# Patient Record
Sex: FEMALE | Race: WHITE | Employment: PART TIME | URBAN - METROPOLITAN AREA
[De-identification: names, ages, dates, MRNs, and addresses within clinical notes are randomized per-mention and may not be internally consistent; named-entity substitution may affect disease eponyms.]

---

## 2017-02-27 RX ORDER — OMEPRAZOLE 20 MG/1
CAPSULE, DELAYED RELEASE ORAL
Qty: 90 CAPSULE | Refills: 0 | Status: SHIPPED | OUTPATIENT
Start: 2017-02-27 | End: 2017-05-24 | Stop reason: SDUPTHER

## 2017-03-08 RX ORDER — AMITRIPTYLINE HYDROCHLORIDE 25 MG/1
TABLET, FILM COATED ORAL
Qty: 90 TABLET | Refills: 0 | Status: SHIPPED | OUTPATIENT
Start: 2017-03-08 | End: 2017-04-04 | Stop reason: SDUPTHER

## 2017-03-09 ENCOUNTER — TELEPHONE (OUTPATIENT)
Dept: FAMILY MEDICINE CLINIC | Age: 52
End: 2017-03-09

## 2017-03-16 ENCOUNTER — OFFICE VISIT (OUTPATIENT)
Dept: FAMILY MEDICINE CLINIC | Age: 52
End: 2017-03-16

## 2017-03-16 VITALS
HEART RATE: 96 BPM | RESPIRATION RATE: 16 BRPM | SYSTOLIC BLOOD PRESSURE: 124 MMHG | DIASTOLIC BLOOD PRESSURE: 84 MMHG | WEIGHT: 183 LBS | BODY MASS INDEX: 30.49 KG/M2 | OXYGEN SATURATION: 98 % | TEMPERATURE: 97.4 F | HEIGHT: 65 IN

## 2017-03-16 DIAGNOSIS — N92.6 ABNORMAL MENSES: ICD-10-CM

## 2017-03-16 DIAGNOSIS — E03.9 HYPOTHYROIDISM, UNSPECIFIED TYPE: ICD-10-CM

## 2017-03-16 DIAGNOSIS — R53.83 FATIGUE, UNSPECIFIED TYPE: Primary | ICD-10-CM

## 2017-03-16 DIAGNOSIS — R63.5 WEIGHT GAIN: ICD-10-CM

## 2017-03-16 LAB
A/G RATIO: 1.7 (ref 1.1–2.2)
ALBUMIN SERPL-MCNC: 4.4 G/DL (ref 3.4–5)
ALP BLD-CCNC: 49 U/L (ref 40–129)
ALT SERPL-CCNC: 14 U/L (ref 10–40)
ANION GAP SERPL CALCULATED.3IONS-SCNC: 13 MMOL/L (ref 3–16)
AST SERPL-CCNC: 13 U/L (ref 15–37)
BASOPHILS ABSOLUTE: 0 K/UL (ref 0–0.2)
BASOPHILS RELATIVE PERCENT: 0.9 %
BILIRUB SERPL-MCNC: 0.3 MG/DL (ref 0–1)
BUN BLDV-MCNC: 17 MG/DL (ref 7–20)
CALCIUM SERPL-MCNC: 9.1 MG/DL (ref 8.3–10.6)
CHLORIDE BLD-SCNC: 107 MMOL/L (ref 99–110)
CO2: 19 MMOL/L (ref 21–32)
CREAT SERPL-MCNC: 1.1 MG/DL (ref 0.6–1.1)
EOSINOPHILS ABSOLUTE: 0.1 K/UL (ref 0–0.6)
EOSINOPHILS RELATIVE PERCENT: 2.3 %
GFR AFRICAN AMERICAN: >60
GFR NON-AFRICAN AMERICAN: 53
GLOBULIN: 2.6 G/DL
GLUCOSE BLD-MCNC: 109 MG/DL (ref 70–99)
HCT VFR BLD CALC: 47.9 % (ref 36–48)
HEMOGLOBIN: 15.1 G/DL (ref 12–16)
LYMPHOCYTES ABSOLUTE: 1.9 K/UL (ref 1–5.1)
LYMPHOCYTES RELATIVE PERCENT: 33.5 %
MCH RBC QN AUTO: 28.4 PG (ref 26–34)
MCHC RBC AUTO-ENTMCNC: 31.4 G/DL (ref 31–36)
MCV RBC AUTO: 90.5 FL (ref 80–100)
MONOCYTES ABSOLUTE: 0.4 K/UL (ref 0–1.3)
MONOCYTES RELATIVE PERCENT: 7.3 %
NEUTROPHILS ABSOLUTE: 3.1 K/UL (ref 1.7–7.7)
NEUTROPHILS RELATIVE PERCENT: 56 %
PDW BLD-RTO: 14.5 % (ref 12.4–15.4)
PLATELET # BLD: 244 K/UL (ref 135–450)
PMV BLD AUTO: 8.9 FL (ref 5–10.5)
POTASSIUM SERPL-SCNC: 4.9 MMOL/L (ref 3.5–5.1)
RBC # BLD: 5.3 M/UL (ref 4–5.2)
SODIUM BLD-SCNC: 139 MMOL/L (ref 136–145)
TOTAL PROTEIN: 7 G/DL (ref 6.4–8.2)
TSH SERPL DL<=0.05 MIU/L-ACNC: 6.59 UIU/ML (ref 0.27–4.2)
VITAMIN D 25-HYDROXY: 30.8 NG/ML
WBC # BLD: 5.5 K/UL (ref 4–11)

## 2017-03-16 PROCEDURE — 99214 OFFICE O/P EST MOD 30 MIN: CPT | Performed by: FAMILY MEDICINE

## 2017-03-16 ASSESSMENT — ENCOUNTER SYMPTOMS
NAUSEA: 0
SWOLLEN GLANDS: 0
SORE THROAT: 0
BACK PAIN: 0
COUGH: 0
VISUAL CHANGE: 0
DIARRHEA: 0
ABDOMINAL PAIN: 0
CHANGE IN BOWEL HABIT: 0
VOMITING: 0

## 2017-03-17 RX ORDER — SUMATRIPTAN 100 MG/1
TABLET, FILM COATED ORAL
Qty: 9 TABLET | Refills: 5 | Status: SHIPPED | OUTPATIENT
Start: 2017-03-17 | End: 2018-02-23 | Stop reason: SDUPTHER

## 2017-03-22 RX ORDER — LEVOTHYROXINE SODIUM 175 UG/1
175 TABLET ORAL DAILY
Qty: 30 TABLET | Refills: 3 | Status: SHIPPED | OUTPATIENT
Start: 2017-03-22 | End: 2017-07-18 | Stop reason: SDUPTHER

## 2017-04-04 DIAGNOSIS — F41.1 GAD (GENERALIZED ANXIETY DISORDER): ICD-10-CM

## 2017-04-04 RX ORDER — TOPIRAMATE 100 MG/1
TABLET, FILM COATED ORAL
Qty: 90 TABLET | Refills: 0 | Status: SHIPPED | OUTPATIENT
Start: 2017-04-04 | End: 2017-06-27 | Stop reason: SDUPTHER

## 2017-04-04 RX ORDER — AMITRIPTYLINE HYDROCHLORIDE 25 MG/1
TABLET, FILM COATED ORAL
Qty: 90 TABLET | Refills: 0 | Status: SHIPPED | OUTPATIENT
Start: 2017-04-04 | End: 2017-04-28 | Stop reason: SDUPTHER

## 2017-05-02 RX ORDER — AMITRIPTYLINE HYDROCHLORIDE 25 MG/1
TABLET, FILM COATED ORAL
Qty: 90 TABLET | Refills: 0 | Status: SHIPPED | OUTPATIENT
Start: 2017-05-02 | End: 2017-06-09 | Stop reason: SDUPTHER

## 2017-05-24 RX ORDER — OMEPRAZOLE 20 MG/1
CAPSULE, DELAYED RELEASE ORAL
Qty: 90 CAPSULE | Refills: 1 | Status: SHIPPED | OUTPATIENT
Start: 2017-05-24 | End: 2017-12-01 | Stop reason: SDUPTHER

## 2017-06-12 RX ORDER — AMITRIPTYLINE HYDROCHLORIDE 25 MG/1
TABLET, FILM COATED ORAL
Qty: 90 TABLET | Refills: 0 | Status: SHIPPED | OUTPATIENT
Start: 2017-06-12 | End: 2017-07-12 | Stop reason: SDUPTHER

## 2017-06-27 DIAGNOSIS — F41.1 GAD (GENERALIZED ANXIETY DISORDER): ICD-10-CM

## 2017-06-27 RX ORDER — BUPROPION HYDROCHLORIDE 150 MG/1
TABLET ORAL
Qty: 270 TABLET | Refills: 2 | Status: SHIPPED | OUTPATIENT
Start: 2017-06-27 | End: 2018-01-04 | Stop reason: SDUPTHER

## 2017-06-27 RX ORDER — TOPIRAMATE 100 MG/1
TABLET, FILM COATED ORAL
Qty: 90 TABLET | Refills: 0 | Status: SHIPPED | OUTPATIENT
Start: 2017-06-27 | End: 2017-10-07 | Stop reason: SDUPTHER

## 2017-07-12 RX ORDER — AMITRIPTYLINE HYDROCHLORIDE 25 MG/1
TABLET, FILM COATED ORAL
Qty: 90 TABLET | Refills: 0 | Status: SHIPPED | OUTPATIENT
Start: 2017-07-12 | End: 2017-08-13 | Stop reason: SDUPTHER

## 2017-07-18 DIAGNOSIS — E03.9 HYPOTHYROIDISM, UNSPECIFIED TYPE: ICD-10-CM

## 2017-07-18 RX ORDER — LEVOTHYROXINE SODIUM 175 UG/1
TABLET ORAL
Qty: 30 TABLET | Refills: 3 | Status: SHIPPED | OUTPATIENT
Start: 2017-07-18 | End: 2017-11-15 | Stop reason: SDUPTHER

## 2017-07-31 DIAGNOSIS — F41.1 GAD (GENERALIZED ANXIETY DISORDER): ICD-10-CM

## 2017-07-31 RX ORDER — DULOXETIN HYDROCHLORIDE 60 MG/1
CAPSULE, DELAYED RELEASE ORAL
Qty: 30 CAPSULE | Refills: 6 | Status: SHIPPED | OUTPATIENT
Start: 2017-07-31 | End: 2018-04-18 | Stop reason: SDUPTHER

## 2017-08-14 RX ORDER — AMITRIPTYLINE HYDROCHLORIDE 25 MG/1
TABLET, FILM COATED ORAL
Qty: 90 TABLET | Refills: 0 | Status: SHIPPED | OUTPATIENT
Start: 2017-08-14 | End: 2017-10-05 | Stop reason: SDUPTHER

## 2017-08-16 ENCOUNTER — OFFICE VISIT (OUTPATIENT)
Dept: FAMILY MEDICINE CLINIC | Age: 52
End: 2017-08-16

## 2017-08-16 VITALS
WEIGHT: 188 LBS | HEIGHT: 66 IN | OXYGEN SATURATION: 98 % | RESPIRATION RATE: 16 BRPM | SYSTOLIC BLOOD PRESSURE: 116 MMHG | DIASTOLIC BLOOD PRESSURE: 74 MMHG | BODY MASS INDEX: 30.22 KG/M2 | HEART RATE: 111 BPM | TEMPERATURE: 99.2 F

## 2017-08-16 DIAGNOSIS — N95.1 MENOPAUSAL SYNDROME: ICD-10-CM

## 2017-08-16 DIAGNOSIS — E66.9 OBESITY (BMI 30-39.9): ICD-10-CM

## 2017-08-16 DIAGNOSIS — R53.83 FATIGUE, UNSPECIFIED TYPE: Primary | ICD-10-CM

## 2017-08-16 DIAGNOSIS — R41.3 MEMORY LOSS: ICD-10-CM

## 2017-08-16 DIAGNOSIS — E03.9 HYPOTHYROIDISM, UNSPECIFIED TYPE: ICD-10-CM

## 2017-08-16 LAB
A/G RATIO: 1.4 (ref 1.1–2.2)
ALBUMIN SERPL-MCNC: 4.1 G/DL (ref 3.4–5)
ALP BLD-CCNC: 57 U/L (ref 40–129)
ALT SERPL-CCNC: 17 U/L (ref 10–40)
ANION GAP SERPL CALCULATED.3IONS-SCNC: 11 MMOL/L (ref 3–16)
AST SERPL-CCNC: 15 U/L (ref 15–37)
BILIRUB SERPL-MCNC: <0.2 MG/DL (ref 0–1)
BUN BLDV-MCNC: 20 MG/DL (ref 7–20)
CALCIUM SERPL-MCNC: 9 MG/DL (ref 8.3–10.6)
CHLORIDE BLD-SCNC: 102 MMOL/L (ref 99–110)
CO2: 23 MMOL/L (ref 21–32)
CORTISOL - AM: 12 UG/DL (ref 4.3–22.4)
CREAT SERPL-MCNC: 0.8 MG/DL (ref 0.6–1.1)
GFR AFRICAN AMERICAN: >60
GFR NON-AFRICAN AMERICAN: >60
GLOBULIN: 2.9 G/DL
GLUCOSE BLD-MCNC: 79 MG/DL (ref 70–99)
POTASSIUM SERPL-SCNC: 5 MMOL/L (ref 3.5–5.1)
SODIUM BLD-SCNC: 136 MMOL/L (ref 136–145)
TOTAL PROTEIN: 7 G/DL (ref 6.4–8.2)
TSH SERPL DL<=0.05 MIU/L-ACNC: 2.69 UIU/ML (ref 0.27–4.2)

## 2017-08-16 PROCEDURE — 99214 OFFICE O/P EST MOD 30 MIN: CPT | Performed by: FAMILY MEDICINE

## 2017-08-16 ASSESSMENT — ENCOUNTER SYMPTOMS
COLOR CHANGE: 0
NAUSEA: 0
SWOLLEN GLANDS: 0
RHINORRHEA: 0
EYE REDNESS: 0
TROUBLE SWALLOWING: 0
CHEST TIGHTNESS: 0
VISUAL CHANGE: 0
ABDOMINAL PAIN: 0
EYE ITCHING: 0
CHANGE IN BOWEL HABIT: 0
WHEEZING: 0
SHORTNESS OF BREATH: 0
VOMITING: 0

## 2017-08-18 LAB — ADRENOCORTICOTROPIC HORMONE: 21 PG/ML (ref 6–58)

## 2017-08-28 ENCOUNTER — OFFICE VISIT (OUTPATIENT)
Dept: FAMILY MEDICINE CLINIC | Age: 52
End: 2017-08-28

## 2017-08-28 VITALS
BODY MASS INDEX: 30.05 KG/M2 | TEMPERATURE: 97.8 F | WEIGHT: 187 LBS | RESPIRATION RATE: 16 BRPM | SYSTOLIC BLOOD PRESSURE: 112 MMHG | HEART RATE: 106 BPM | HEIGHT: 66 IN | OXYGEN SATURATION: 98 % | DIASTOLIC BLOOD PRESSURE: 78 MMHG

## 2017-08-28 DIAGNOSIS — Z12.11 SCREENING FOR COLON CANCER: ICD-10-CM

## 2017-08-28 DIAGNOSIS — Z12.31 ENCOUNTER FOR SCREENING MAMMOGRAM FOR BREAST CANCER: ICD-10-CM

## 2017-08-28 DIAGNOSIS — Z00.00 WELL ADULT EXAM: Primary | ICD-10-CM

## 2017-08-28 DIAGNOSIS — Z11.4 SCREENING FOR HIV WITHOUT PRESENCE OF RISK FACTORS: ICD-10-CM

## 2017-08-28 LAB
CHOLESTEROL, TOTAL: 178 MG/DL (ref 0–199)
HDLC SERPL-MCNC: 49 MG/DL (ref 40–60)
LDL CHOLESTEROL CALCULATED: 110 MG/DL
TRIGL SERPL-MCNC: 94 MG/DL (ref 0–150)
VLDLC SERPL CALC-MCNC: 19 MG/DL

## 2017-08-28 PROCEDURE — 99396 PREV VISIT EST AGE 40-64: CPT | Performed by: FAMILY MEDICINE

## 2017-08-28 ASSESSMENT — ENCOUNTER SYMPTOMS
TROUBLE SWALLOWING: 0
CHEST TIGHTNESS: 0
VOMITING: 0
ABDOMINAL PAIN: 0
NAUSEA: 0
RHINORRHEA: 0
WHEEZING: 0
SHORTNESS OF BREATH: 0
EYE REDNESS: 0
EYE ITCHING: 0

## 2017-08-29 LAB — HIV-1 AND HIV-2 ANTIBODIES: NORMAL

## 2017-10-07 DIAGNOSIS — F41.1 GAD (GENERALIZED ANXIETY DISORDER): ICD-10-CM

## 2017-10-09 RX ORDER — AMITRIPTYLINE HYDROCHLORIDE 25 MG/1
TABLET, FILM COATED ORAL
Qty: 90 TABLET | Refills: 0 | Status: SHIPPED | OUTPATIENT
Start: 2017-10-09 | End: 2017-11-16 | Stop reason: SDUPTHER

## 2017-10-09 RX ORDER — TOPIRAMATE 100 MG/1
TABLET, FILM COATED ORAL
Qty: 90 TABLET | Refills: 0 | Status: SHIPPED | OUTPATIENT
Start: 2017-10-09 | End: 2017-12-20 | Stop reason: SDUPTHER

## 2017-11-06 ENCOUNTER — TELEPHONE (OUTPATIENT)
Dept: FAMILY MEDICINE CLINIC | Age: 52
End: 2017-11-06

## 2017-11-06 DIAGNOSIS — E03.9 HYPOTHYROIDISM, UNSPECIFIED TYPE: Primary | ICD-10-CM

## 2017-11-07 LAB — TSH SERPL DL<=0.05 MIU/L-ACNC: 1.57 UIU/ML (ref 0.27–4.2)

## 2017-11-15 DIAGNOSIS — E03.9 HYPOTHYROIDISM, UNSPECIFIED TYPE: ICD-10-CM

## 2017-11-16 ENCOUNTER — OFFICE VISIT (OUTPATIENT)
Dept: FAMILY MEDICINE CLINIC | Age: 52
End: 2017-11-16

## 2017-11-16 VITALS
HEIGHT: 65 IN | RESPIRATION RATE: 16 BRPM | SYSTOLIC BLOOD PRESSURE: 120 MMHG | DIASTOLIC BLOOD PRESSURE: 84 MMHG | TEMPERATURE: 98.8 F | HEART RATE: 100 BPM | BODY MASS INDEX: 31.65 KG/M2 | WEIGHT: 190 LBS | OXYGEN SATURATION: 98 %

## 2017-11-16 DIAGNOSIS — E66.09 CLASS 1 OBESITY DUE TO EXCESS CALORIES WITHOUT SERIOUS COMORBIDITY WITH BODY MASS INDEX (BMI) OF 31.0 TO 31.9 IN ADULT: ICD-10-CM

## 2017-11-16 DIAGNOSIS — G47.00 INSOMNIA, UNSPECIFIED TYPE: ICD-10-CM

## 2017-11-16 DIAGNOSIS — F41.0 PANIC ATTACKS: Primary | ICD-10-CM

## 2017-11-16 PROCEDURE — G8484 FLU IMMUNIZE NO ADMIN: HCPCS | Performed by: FAMILY MEDICINE

## 2017-11-16 PROCEDURE — 3017F COLORECTAL CA SCREEN DOC REV: CPT | Performed by: FAMILY MEDICINE

## 2017-11-16 PROCEDURE — G8417 CALC BMI ABV UP PARAM F/U: HCPCS | Performed by: FAMILY MEDICINE

## 2017-11-16 PROCEDURE — G8427 DOCREV CUR MEDS BY ELIG CLIN: HCPCS | Performed by: FAMILY MEDICINE

## 2017-11-16 PROCEDURE — 99214 OFFICE O/P EST MOD 30 MIN: CPT | Performed by: FAMILY MEDICINE

## 2017-11-16 PROCEDURE — 1036F TOBACCO NON-USER: CPT | Performed by: FAMILY MEDICINE

## 2017-11-16 RX ORDER — LEVOTHYROXINE SODIUM 175 UG/1
TABLET ORAL
Qty: 30 TABLET | Refills: 3 | Status: SHIPPED | OUTPATIENT
Start: 2017-11-16 | End: 2018-05-15 | Stop reason: SDUPTHER

## 2017-11-16 RX ORDER — AMITRIPTYLINE HYDROCHLORIDE 25 MG/1
TABLET, FILM COATED ORAL
Qty: 90 TABLET | Refills: 0 | Status: SHIPPED | OUTPATIENT
Start: 2017-11-16 | End: 2017-12-17 | Stop reason: SDUPTHER

## 2017-11-16 RX ORDER — CLONAZEPAM 1 MG/1
1 TABLET ORAL 2 TIMES DAILY PRN
Qty: 60 TABLET | Refills: 1 | Status: SHIPPED | OUTPATIENT
Start: 2017-11-16 | End: 2018-08-06 | Stop reason: SDUPTHER

## 2017-11-16 ASSESSMENT — ENCOUNTER SYMPTOMS
VISUAL CHANGE: 0
ABDOMINAL PAIN: 0

## 2017-11-16 NOTE — PROGRESS NOTES
Prescriptions:     clonazePAM (KLONOPIN) 1 MG tablet, Take 1 tablet by mouth 2 times daily as needed for Anxiety . , Disp: 60 tablet, Rfl: 1    amitriptyline (ELAVIL) 25 MG tablet, TAKE 3 TABLETS BY MOUTH AT BEDTIME, Disp: 90 tablet, Rfl: 0    topiramate (TOPAMAX) 100 MG tablet, TAKE ONE TABLET BY MOUTH DAILY, Disp: 90 tablet, Rfl: 0    DULoxetine (CYMBALTA) 60 MG extended release capsule, TAKE 1 CAPSULE BY MOUTH DAILY, Disp: 30 capsule, Rfl: 6    levothyroxine (SYNTHROID) 175 MCG tablet, TAKE 1 TABLET BY MOUTH DAILY, Disp: 30 tablet, Rfl: 3    buPROPion (WELLBUTRIN XL) 150 MG extended release tablet, TAKE 3 TABLETS BY MOUTH EVERY MORNING, Disp: 270 tablet, Rfl: 2    omeprazole (PRILOSEC) 20 MG delayed release capsule, TAKE ONE CAPSULE BY MOUTH ONE TIME DAILY, Disp: 90 capsule, Rfl: 1    SUMAtriptan (IMITREX) 100 MG tablet, TAKE ONE TABLET BY MOUTH AT ONSET OF HEADACHE. MAY REPEAT IN 2 HOURS MAX OF 2 TABS PER 24 HOURS, Disp: 9 tablet, Rfl: 5    butalbital-acetaminophen-caffeine (FIORICET, ESGIC) -40 MG per tablet, Take 1 tablet by mouth every 6 hours as needed for Pain or Headaches., Disp: 30 tablet, Rfl: 1    MedroxyPROGESTERone Acetate (DEPO-PROVERA IM), Inject  into the muscle., Disp: , Rfl:     propranolol (INDERAL) 10 MG tablet, Take 10 mg by mouth 2 times daily. , Disp: , Rfl:     primidone (MYSOLINE) 50 MG tablet, Take 100 mg by mouth nightly., Disp: , Rfl:      has a past medical history of Anxiety; Breast cancer ([address] Cervical disc disorder at C6-C7 level with myelopathy; Cervical spondylarthritis; Depression; Hypothyroidism; Migraine; Neuropathy ([address] TMJ (temporomandibular joint syndrome); TMJ disease; and Urinary incontinence. Past Surgical History:   Procedure Laterality Date    BREAST LUMPECTOMY      right at  48 yo     KNEE SURGERY Right 2013    SPINAL FUSION  2013    TONSILLECTOMY          reports that she has never smoked.  She has never used smokeless tobacco. She reports that she drinks alcohol.    family history includes Cancer in her mother; High Blood Pressure in her brother; Mental Illness in her mother; Other in her brother and mother. Objective:   Physical Exam   Constitutional: She is oriented to person, place, and time. She appears well-developed and well-nourished. No distress. Obese WF     HENT:   Head: Normocephalic. Hearing intact to nml conversation      Eyes: Conjunctivae and EOM are normal. Pupils are equal, round, and reactive to light. No scleral icterus. Neck: Normal range of motion. Cardiovascular: Normal rate and regular rhythm. No murmur heard. Pulmonary/Chest: Effort normal and breath sounds normal. No respiratory distress. She has no wheezes. Musculoskeletal: Normal range of motion. Neurological: She is alert and oriented to person, place, and time. No cranial nerve deficit. Coordination normal.   Skin: Skin is warm. No erythema. No pallor. Psychiatric: She has a normal mood and affect. Her behavior is normal. Judgment and thought content normal.   Nursing note and vitals reviewed. Assessment:      1. Panic attacks     2. Insomnia, unspecified type     3. Class 1 obesity due to excess calories without serious comorbidity with body mass index (BMI) of 31.0 to 31.9 in adult             Plan:      1. Deteriorated  Refill klonopin ONLY prn   Controlled Substances Monitoring: Attestation: The Prescription Monitoring Report for this patient was reviewed today. Sundeep Blum MD)  Documentation: No signs of potential drug abuse or diversion identified. Sundeep Blum MD)      2. Sleep hygiene  Refills  Do not take elavil/klonopin together,   patient agrees and verbalizes understanding    3.   Counseling: encourage to adjust caloric and fat  intake to maintain or achieve ideal body weight, to emphasize fruits, vegetables, whole grains, if possible drink  vegetable milks, reduce meats, poultry, fish, and rich in legume like beans,lentus,

## 2017-12-01 RX ORDER — OMEPRAZOLE 20 MG/1
CAPSULE, DELAYED RELEASE ORAL
Qty: 90 CAPSULE | Refills: 1 | Status: SHIPPED | OUTPATIENT
Start: 2017-12-01 | End: 2018-06-17 | Stop reason: SDUPTHER

## 2017-12-18 RX ORDER — AMITRIPTYLINE HYDROCHLORIDE 25 MG/1
TABLET, FILM COATED ORAL
Qty: 90 TABLET | Refills: 0 | Status: SHIPPED | OUTPATIENT
Start: 2017-12-18 | End: 2018-01-14 | Stop reason: SDUPTHER

## 2017-12-20 DIAGNOSIS — F41.1 GAD (GENERALIZED ANXIETY DISORDER): ICD-10-CM

## 2017-12-20 RX ORDER — TOPIRAMATE 100 MG/1
TABLET, FILM COATED ORAL
Qty: 90 TABLET | Refills: 1 | Status: SHIPPED | OUTPATIENT
Start: 2017-12-20 | End: 2018-08-06 | Stop reason: ALTCHOICE

## 2018-01-04 DIAGNOSIS — F41.1 GAD (GENERALIZED ANXIETY DISORDER): ICD-10-CM

## 2018-01-04 RX ORDER — BUPROPION HYDROCHLORIDE 150 MG/1
TABLET ORAL
Qty: 270 TABLET | Refills: 2 | Status: SHIPPED | OUTPATIENT
Start: 2018-01-04 | End: 2018-03-19 | Stop reason: SDUPTHER

## 2018-01-15 RX ORDER — AMITRIPTYLINE HYDROCHLORIDE 25 MG/1
TABLET, FILM COATED ORAL
Qty: 90 TABLET | Refills: 0 | Status: SHIPPED | OUTPATIENT
Start: 2018-01-15 | End: 2018-02-13 | Stop reason: SDUPTHER

## 2018-02-13 RX ORDER — AMITRIPTYLINE HYDROCHLORIDE 25 MG/1
TABLET, FILM COATED ORAL
Qty: 90 TABLET | Refills: 0 | Status: SHIPPED | OUTPATIENT
Start: 2018-02-13 | End: 2018-03-13 | Stop reason: SDUPTHER

## 2018-02-26 RX ORDER — SUMATRIPTAN 100 MG/1
TABLET, FILM COATED ORAL
Qty: 9 TABLET | Refills: 5 | Status: SHIPPED | OUTPATIENT
Start: 2018-02-26 | End: 2018-12-20 | Stop reason: SDUPTHER

## 2018-03-13 RX ORDER — AMITRIPTYLINE HYDROCHLORIDE 25 MG/1
TABLET, FILM COATED ORAL
Qty: 90 TABLET | Refills: 0 | Status: SHIPPED | OUTPATIENT
Start: 2018-03-13 | End: 2018-04-19 | Stop reason: SDUPTHER

## 2018-03-19 DIAGNOSIS — F41.1 GAD (GENERALIZED ANXIETY DISORDER): ICD-10-CM

## 2018-03-19 NOTE — TELEPHONE ENCOUNTER
[facility] states that they will need to get a PA on bupropion er 150 mg tabs or send a new rx for 300 mg and 150 mg to equal the 450 mg per day. Insurance will not pay for 3 tabs of this dose per day. Please advise.

## 2018-03-26 ENCOUNTER — TELEPHONE (OUTPATIENT)
Dept: FAMILY MEDICINE CLINIC | Age: 53
End: 2018-03-26

## 2018-03-26 DIAGNOSIS — F41.1 GAD (GENERALIZED ANXIETY DISORDER): ICD-10-CM

## 2018-03-26 RX ORDER — BUPROPION HYDROCHLORIDE 150 MG/1
TABLET ORAL
Qty: 270 TABLET | Refills: 2 | Status: SHIPPED | OUTPATIENT
Start: 2018-03-26 | End: 2020-05-11

## 2018-03-26 NOTE — TELEPHONE ENCOUNTER
[facility] states that they will need a new rx for 300 mg and 150 mg to equal the 450 mg per day. Insurance will not pay for 3 tabs of this dose per day. Please advise.

## 2018-03-26 NOTE — TELEPHONE ENCOUNTER
[facility] is calling today stating that the patients buPROPion (WELLBUTRIN XL) 150 MG extended release tablet that her insurance will cover two separate rx's of 300MG and 150MG  Please rudy

## 2018-03-27 RX ORDER — BUPROPION HYDROCHLORIDE 150 MG/1
150 TABLET ORAL EVERY MORNING
Qty: 90 TABLET | Refills: 1 | Status: SHIPPED | OUTPATIENT
Start: 2018-03-27 | End: 2018-09-12 | Stop reason: SDUPTHER

## 2018-03-27 RX ORDER — BUPROPION HYDROCHLORIDE 300 MG/1
300 TABLET ORAL EVERY MORNING
Qty: 90 TABLET | Refills: 1 | Status: SHIPPED | OUTPATIENT
Start: 2018-03-27 | End: 2018-09-12 | Stop reason: SDUPTHER

## 2018-04-18 DIAGNOSIS — F41.1 GAD (GENERALIZED ANXIETY DISORDER): ICD-10-CM

## 2018-04-18 RX ORDER — DULOXETIN HYDROCHLORIDE 60 MG/1
CAPSULE, DELAYED RELEASE ORAL
Qty: 30 CAPSULE | Refills: 6 | Status: SHIPPED | OUTPATIENT
Start: 2018-04-18 | End: 2018-08-06

## 2018-04-19 RX ORDER — AMITRIPTYLINE HYDROCHLORIDE 25 MG/1
TABLET, FILM COATED ORAL
Qty: 90 TABLET | Refills: 0 | Status: SHIPPED | OUTPATIENT
Start: 2018-04-19 | End: 2018-05-16 | Stop reason: SDUPTHER

## 2018-05-15 DIAGNOSIS — E03.9 HYPOTHYROIDISM, UNSPECIFIED TYPE: ICD-10-CM

## 2018-05-15 RX ORDER — LEVOTHYROXINE SODIUM 175 UG/1
TABLET ORAL
Qty: 30 TABLET | Refills: 3 | Status: SHIPPED | OUTPATIENT
Start: 2018-05-15 | End: 2018-09-12 | Stop reason: SDUPTHER

## 2018-05-16 RX ORDER — AMITRIPTYLINE HYDROCHLORIDE 25 MG/1
TABLET, FILM COATED ORAL
Qty: 90 TABLET | Refills: 0 | Status: SHIPPED | OUTPATIENT
Start: 2018-05-16 | End: 2018-06-19 | Stop reason: SDUPTHER

## 2018-06-19 RX ORDER — OMEPRAZOLE 20 MG/1
CAPSULE, DELAYED RELEASE ORAL
Qty: 90 CAPSULE | Refills: 1 | Status: SHIPPED | OUTPATIENT
Start: 2018-06-19 | End: 2018-12-11 | Stop reason: SDUPTHER

## 2018-06-20 RX ORDER — AMITRIPTYLINE HYDROCHLORIDE 25 MG/1
TABLET, FILM COATED ORAL
Qty: 90 TABLET | Refills: 0 | Status: SHIPPED | OUTPATIENT
Start: 2018-06-20 | End: 2018-07-18 | Stop reason: SDUPTHER

## 2018-07-19 RX ORDER — AMITRIPTYLINE HYDROCHLORIDE 25 MG/1
TABLET, FILM COATED ORAL
Qty: 90 TABLET | Refills: 0 | Status: SHIPPED | OUTPATIENT
Start: 2018-07-19 | End: 2018-08-16 | Stop reason: SDUPTHER

## 2018-08-01 ENCOUNTER — TELEPHONE (OUTPATIENT)
Dept: FAMILY MEDICINE CLINIC | Age: 53
End: 2018-08-01

## 2018-08-01 DIAGNOSIS — Z00.00 WELL ADULT EXAM: Primary | ICD-10-CM

## 2018-08-01 NOTE — TELEPHONE ENCOUNTER
Patient wants to have blood work done prior to her apointment on [facility]      Please call when done.

## 2018-08-02 DIAGNOSIS — Z00.00 WELL ADULT EXAM: ICD-10-CM

## 2018-08-02 LAB
A/G RATIO: 1.7 (ref 1.1–2.2)
ALBUMIN SERPL-MCNC: 4.3 G/DL (ref 3.4–5)
ALP BLD-CCNC: 57 U/L (ref 40–129)
ALT SERPL-CCNC: 22 U/L (ref 10–40)
ANION GAP SERPL CALCULATED.3IONS-SCNC: 15 MMOL/L (ref 3–16)
AST SERPL-CCNC: 19 U/L (ref 15–37)
BASOPHILS ABSOLUTE: 0.1 K/UL (ref 0–0.2)
BASOPHILS RELATIVE PERCENT: 1.2 %
BILIRUB SERPL-MCNC: <0.2 MG/DL (ref 0–1)
BUN BLDV-MCNC: 14 MG/DL (ref 7–20)
CALCIUM SERPL-MCNC: 9.1 MG/DL (ref 8.3–10.6)
CHLORIDE BLD-SCNC: 105 MMOL/L (ref 99–110)
CHOLESTEROL, TOTAL: 191 MG/DL (ref 0–199)
CO2: 20 MMOL/L (ref 21–32)
CREAT SERPL-MCNC: 0.9 MG/DL (ref 0.6–1.1)
EOSINOPHILS ABSOLUTE: 0.2 K/UL (ref 0–0.6)
EOSINOPHILS RELATIVE PERCENT: 3 %
GFR AFRICAN AMERICAN: >60
GFR NON-AFRICAN AMERICAN: >60
GLOBULIN: 2.6 G/DL
GLUCOSE BLD-MCNC: 114 MG/DL (ref 70–99)
HCT VFR BLD CALC: 45.6 % (ref 36–48)
HDLC SERPL-MCNC: 51 MG/DL (ref 40–60)
HEMOGLOBIN: 14.9 G/DL (ref 12–16)
LDL CHOLESTEROL CALCULATED: 122 MG/DL
LYMPHOCYTES ABSOLUTE: 2 K/UL (ref 1–5.1)
LYMPHOCYTES RELATIVE PERCENT: 26.6 %
MCH RBC QN AUTO: 29.2 PG (ref 26–34)
MCHC RBC AUTO-ENTMCNC: 32.6 G/DL (ref 31–36)
MCV RBC AUTO: 89.6 FL (ref 80–100)
MONOCYTES ABSOLUTE: 0.5 K/UL (ref 0–1.3)
MONOCYTES RELATIVE PERCENT: 6.4 %
NEUTROPHILS ABSOLUTE: 4.7 K/UL (ref 1.7–7.7)
NEUTROPHILS RELATIVE PERCENT: 62.8 %
PDW BLD-RTO: 13.8 % (ref 12.4–15.4)
PLATELET # BLD: 236 K/UL (ref 135–450)
PMV BLD AUTO: 8.5 FL (ref 5–10.5)
POTASSIUM SERPL-SCNC: 4.5 MMOL/L (ref 3.5–5.1)
RBC # BLD: 5.09 M/UL (ref 4–5.2)
SODIUM BLD-SCNC: 140 MMOL/L (ref 136–145)
TOTAL PROTEIN: 6.9 G/DL (ref 6.4–8.2)
TRIGL SERPL-MCNC: 90 MG/DL (ref 0–150)
TSH SERPL DL<=0.05 MIU/L-ACNC: 1.73 UIU/ML (ref 0.27–4.2)
VLDLC SERPL CALC-MCNC: 18 MG/DL
WBC # BLD: 7.4 K/UL (ref 4–11)

## 2018-08-06 ENCOUNTER — OFFICE VISIT (OUTPATIENT)
Dept: FAMILY MEDICINE CLINIC | Age: 53
End: 2018-08-06

## 2018-08-06 VITALS
BODY MASS INDEX: 30.05 KG/M2 | RESPIRATION RATE: 16 BRPM | SYSTOLIC BLOOD PRESSURE: 122 MMHG | HEART RATE: 96 BPM | OXYGEN SATURATION: 98 % | TEMPERATURE: 97.7 F | WEIGHT: 187 LBS | HEIGHT: 66 IN | DIASTOLIC BLOOD PRESSURE: 84 MMHG

## 2018-08-06 DIAGNOSIS — R73.03 PRE-DIABETES: ICD-10-CM

## 2018-08-06 DIAGNOSIS — R51.9 CHRONIC NONINTRACTABLE HEADACHE, UNSPECIFIED HEADACHE TYPE: ICD-10-CM

## 2018-08-06 DIAGNOSIS — R51.9 NONINTRACTABLE HEADACHE, UNSPECIFIED CHRONICITY PATTERN, UNSPECIFIED HEADACHE TYPE: ICD-10-CM

## 2018-08-06 DIAGNOSIS — E66.09 CLASS 1 OBESITY DUE TO EXCESS CALORIES WITHOUT SERIOUS COMORBIDITY WITH BODY MASS INDEX (BMI) OF 30.0 TO 30.9 IN ADULT: ICD-10-CM

## 2018-08-06 DIAGNOSIS — F41.8 DEPRESSION WITH ANXIETY: ICD-10-CM

## 2018-08-06 DIAGNOSIS — G89.29 CHRONIC NONINTRACTABLE HEADACHE, UNSPECIFIED HEADACHE TYPE: ICD-10-CM

## 2018-08-06 DIAGNOSIS — Z00.00 WELL ADULT EXAM: Primary | ICD-10-CM

## 2018-08-06 DIAGNOSIS — F41.0 PANIC ATTACKS: ICD-10-CM

## 2018-08-06 PROBLEM — E66.811 CLASS 1 OBESITY DUE TO EXCESS CALORIES WITHOUT SERIOUS COMORBIDITY WITH BODY MASS INDEX (BMI) OF 30.0 TO 30.9 IN ADULT: Status: ACTIVE | Noted: 2018-08-06

## 2018-08-06 PROCEDURE — G0444 DEPRESSION SCREEN ANNUAL: HCPCS | Performed by: FAMILY MEDICINE

## 2018-08-06 PROCEDURE — 99213 OFFICE O/P EST LOW 20 MIN: CPT | Performed by: FAMILY MEDICINE

## 2018-08-06 PROCEDURE — 99396 PREV VISIT EST AGE 40-64: CPT | Performed by: FAMILY MEDICINE

## 2018-08-06 RX ORDER — ESCITALOPRAM OXALATE 20 MG/1
20 TABLET ORAL DAILY
Qty: 30 TABLET | Refills: 5 | Status: SHIPPED | OUTPATIENT
Start: 2018-08-06 | End: 2019-02-06 | Stop reason: SDUPTHER

## 2018-08-06 RX ORDER — DULOXETIN HYDROCHLORIDE 20 MG/1
20 CAPSULE, DELAYED RELEASE ORAL DAILY
Qty: 14 CAPSULE | Refills: 0 | Status: SHIPPED | OUTPATIENT
Start: 2018-08-06 | End: 2020-05-11 | Stop reason: ALTCHOICE

## 2018-08-06 RX ORDER — INDOMETHACIN 25 MG/1
25 CAPSULE ORAL 3 TIMES DAILY
Qty: 60 CAPSULE | Refills: 3
Start: 2018-08-06 | End: 2020-05-11 | Stop reason: ALTCHOICE

## 2018-08-06 RX ORDER — BUTALBITAL, ACETAMINOPHEN AND CAFFEINE 50; 325; 40 MG/1; MG/1; MG/1
1 TABLET ORAL EVERY 6 HOURS PRN
Qty: 30 TABLET | Refills: 1 | Status: SHIPPED | OUTPATIENT
Start: 2018-08-06 | End: 2019-01-28 | Stop reason: SDUPTHER

## 2018-08-06 RX ORDER — CLONAZEPAM 1 MG/1
1 TABLET ORAL 2 TIMES DAILY PRN
Qty: 60 TABLET | Refills: 0 | Status: SHIPPED | OUTPATIENT
Start: 2018-08-06 | End: 2020-05-11 | Stop reason: ALTCHOICE

## 2018-08-06 RX ORDER — DULOXETIN HYDROCHLORIDE 30 MG/1
30 CAPSULE, DELAYED RELEASE ORAL DAILY
Qty: 14 CAPSULE | Refills: 0 | Status: SHIPPED | OUTPATIENT
Start: 2018-08-06 | End: 2020-05-11 | Stop reason: ALTCHOICE

## 2018-08-06 ASSESSMENT — PATIENT HEALTH QUESTIONNAIRE - PHQ9
4. FEELING TIRED OR HAVING LITTLE ENERGY: 2
7. TROUBLE CONCENTRATING ON THINGS, SUCH AS READING THE NEWSPAPER OR WATCHING TELEVISION: 0
8. MOVING OR SPEAKING SO SLOWLY THAT OTHER PEOPLE COULD HAVE NOTICED. OR THE OPPOSITE, BEING SO FIGETY OR RESTLESS THAT YOU HAVE BEEN MOVING AROUND A LOT MORE THAN USUAL: 2
9. THOUGHTS THAT YOU WOULD BE BETTER OFF DEAD, OR OF HURTING YOURSELF: 0
10. IF YOU CHECKED OFF ANY PROBLEMS, HOW DIFFICULT HAVE THESE PROBLEMS MADE IT FOR YOU TO DO YOUR WORK, TAKE CARE OF THINGS AT HOME, OR GET ALONG WITH OTHER PEOPLE: 1
SUM OF ALL RESPONSES TO PHQ QUESTIONS 1-9: 14
2. FEELING DOWN, DEPRESSED OR HOPELESS: 3
SUM OF ALL RESPONSES TO PHQ9 QUESTIONS 1 & 2: 4
7. TROUBLE CONCENTRATING ON THINGS, SUCH AS READING THE NEWSPAPER OR WATCHING TELEVISION: 2
1. LITTLE INTEREST OR PLEASURE IN DOING THINGS: 1
5. POOR APPETITE OR OVEREATING: 2
3. TROUBLE FALLING OR STAYING ASLEEP: 2
6. FEELING BAD ABOUT YOURSELF - OR THAT YOU ARE A FAILURE OR HAVE LET YOURSELF OR YOUR FAMILY DOWN: 2
SUM OF ALL RESPONSES TO PHQ9 QUESTIONS 1 & 2: 4
SUM OF ALL RESPONSES TO PHQ QUESTIONS 1-9: 15
8. MOVING OR SPEAKING SO SLOWLY THAT OTHER PEOPLE COULD HAVE NOTICED. OR THE OPPOSITE, BEING SO FIGETY OR RESTLESS THAT YOU HAVE BEEN MOVING AROUND A LOT MORE THAN USUAL: 0
2. FEELING DOWN, DEPRESSED OR HOPELESS: 2
1. LITTLE INTEREST OR PLEASURE IN DOING THINGS: 2
6. FEELING BAD ABOUT YOURSELF - OR THAT YOU ARE A FAILURE OR HAVE LET YOURSELF OR YOUR FAMILY DOWN: 2
10. IF YOU CHECKED OFF ANY PROBLEMS, HOW DIFFICULT HAVE THESE PROBLEMS MADE IT FOR YOU TO DO YOUR WORK, TAKE CARE OF THINGS AT HOME, OR GET ALONG WITH OTHER PEOPLE: 0
9. THOUGHTS THAT YOU WOULD BE BETTER OFF DEAD, OR OF HURTING YOURSELF: 0
5. POOR APPETITE OR OVEREATING: 1
3. TROUBLE FALLING OR STAYING ASLEEP: 3
4. FEELING TIRED OR HAVING LITTLE ENERGY: 3

## 2018-08-06 ASSESSMENT — ENCOUNTER SYMPTOMS
NAUSEA: 0
SHORTNESS OF BREATH: 0
EYE REDNESS: 0
RHINORRHEA: 0
WHEEZING: 0
EYE ITCHING: 0
TROUBLE SWALLOWING: 0
ABDOMINAL PAIN: 0
VOMITING: 0
CHEST TIGHTNESS: 0

## 2018-08-17 RX ORDER — AMITRIPTYLINE HYDROCHLORIDE 25 MG/1
TABLET, FILM COATED ORAL
Qty: 90 TABLET | Refills: 0 | Status: SHIPPED | OUTPATIENT
Start: 2018-08-17 | End: 2018-09-13 | Stop reason: SDUPTHER

## 2018-09-12 DIAGNOSIS — E03.9 HYPOTHYROIDISM, UNSPECIFIED TYPE: ICD-10-CM

## 2018-09-13 RX ORDER — BUPROPION HYDROCHLORIDE 300 MG/1
300 TABLET ORAL EVERY MORNING
Qty: 90 TABLET | Refills: 1 | Status: SHIPPED | OUTPATIENT
Start: 2018-09-13 | End: 2019-05-06 | Stop reason: SDUPTHER

## 2018-09-13 RX ORDER — LEVOTHYROXINE SODIUM 175 UG/1
TABLET ORAL
Qty: 30 TABLET | Refills: 3 | Status: SHIPPED | OUTPATIENT
Start: 2018-09-13 | End: 2019-01-29 | Stop reason: SDUPTHER

## 2018-09-13 RX ORDER — BUPROPION HYDROCHLORIDE 150 MG/1
150 TABLET ORAL EVERY MORNING
Qty: 90 TABLET | Refills: 1 | Status: SHIPPED | OUTPATIENT
Start: 2018-09-13 | End: 2019-05-06 | Stop reason: SDUPTHER

## 2018-09-14 RX ORDER — AMITRIPTYLINE HYDROCHLORIDE 25 MG/1
TABLET, FILM COATED ORAL
Qty: 90 TABLET | Refills: 0 | Status: SHIPPED | OUTPATIENT
Start: 2018-09-14 | End: 2018-10-16 | Stop reason: SDUPTHER

## 2018-11-05 ENCOUNTER — TELEPHONE (OUTPATIENT)
Dept: FAMILY MEDICINE CLINIC | Age: 53
End: 2018-11-05

## 2018-11-05 DIAGNOSIS — E03.9 HYPOTHYROIDISM, UNSPECIFIED TYPE: ICD-10-CM

## 2018-11-05 DIAGNOSIS — R73.03 PRE-DIABETES: Primary | ICD-10-CM

## 2018-11-10 LAB
ANION GAP SERPL CALCULATED.3IONS-SCNC: 13 MMOL/L (ref 3–16)
BUN BLDV-MCNC: 15 MG/DL (ref 7–20)
CALCIUM SERPL-MCNC: 9.4 MG/DL (ref 8.3–10.6)
CHLORIDE BLD-SCNC: 106 MMOL/L (ref 99–110)
CO2: 23 MMOL/L (ref 21–32)
CREAT SERPL-MCNC: 0.8 MG/DL (ref 0.6–1.1)
ESTIMATED AVERAGE GLUCOSE: 128.4 MG/DL
GFR AFRICAN AMERICAN: >60
GFR NON-AFRICAN AMERICAN: >60
GLUCOSE BLD-MCNC: 105 MG/DL (ref 70–99)
HBA1C MFR BLD: 6.1 %
POTASSIUM SERPL-SCNC: 4.9 MMOL/L (ref 3.5–5.1)
SODIUM BLD-SCNC: 142 MMOL/L (ref 136–145)
TSH SERPL DL<=0.05 MIU/L-ACNC: 36.26 UIU/ML (ref 0.27–4.2)

## 2018-11-13 DIAGNOSIS — E03.9 HYPOTHYROIDISM, UNSPECIFIED TYPE: Primary | ICD-10-CM

## 2018-12-11 RX ORDER — OMEPRAZOLE 20 MG/1
CAPSULE, DELAYED RELEASE ORAL
Qty: 90 CAPSULE | Refills: 1 | Status: SHIPPED | OUTPATIENT
Start: 2018-12-11 | End: 2019-08-05 | Stop reason: SDUPTHER

## 2018-12-20 RX ORDER — SUMATRIPTAN 100 MG/1
TABLET, FILM COATED ORAL
Qty: 9 TABLET | Refills: 5 | Status: SHIPPED | OUTPATIENT
Start: 2018-12-20 | End: 2019-08-14 | Stop reason: SDUPTHER

## 2019-01-28 DIAGNOSIS — R51.9 NONINTRACTABLE HEADACHE, UNSPECIFIED CHRONICITY PATTERN, UNSPECIFIED HEADACHE TYPE: ICD-10-CM

## 2019-01-29 DIAGNOSIS — E03.9 HYPOTHYROIDISM, UNSPECIFIED TYPE: ICD-10-CM

## 2019-01-29 RX ORDER — LEVOTHYROXINE SODIUM 175 UG/1
TABLET ORAL
Qty: 30 TABLET | Refills: 3 | Status: SHIPPED | OUTPATIENT
Start: 2019-01-29 | End: 2019-11-09 | Stop reason: SDUPTHER

## 2019-01-29 RX ORDER — BUTALBITAL, ACETAMINOPHEN AND CAFFEINE 50; 325; 40 MG/1; MG/1; MG/1
1 TABLET ORAL EVERY 6 HOURS PRN
Qty: 30 TABLET | Refills: 1 | Status: SHIPPED | OUTPATIENT
Start: 2019-01-29 | End: 2019-09-24 | Stop reason: SDUPTHER

## 2019-02-06 RX ORDER — ESCITALOPRAM OXALATE 20 MG/1
TABLET ORAL
Qty: 30 TABLET | Refills: 5 | Status: SHIPPED | OUTPATIENT
Start: 2019-02-06 | End: 2019-12-03 | Stop reason: SDUPTHER

## 2019-02-06 RX ORDER — AMITRIPTYLINE HYDROCHLORIDE 25 MG/1
TABLET, FILM COATED ORAL
Qty: 90 TABLET | Refills: 0 | Status: SHIPPED | OUTPATIENT
Start: 2019-02-06 | End: 2019-06-30 | Stop reason: SDUPTHER

## 2019-05-06 RX ORDER — BUPROPION HYDROCHLORIDE 300 MG/1
300 TABLET ORAL EVERY MORNING
Qty: 90 TABLET | Refills: 1 | Status: SHIPPED | OUTPATIENT
Start: 2019-05-06 | End: 2019-11-03 | Stop reason: SDUPTHER

## 2019-05-06 RX ORDER — BUPROPION HYDROCHLORIDE 150 MG/1
150 TABLET ORAL EVERY MORNING
Qty: 90 TABLET | Refills: 1 | Status: SHIPPED | OUTPATIENT
Start: 2019-05-06 | End: 2019-11-03 | Stop reason: SDUPTHER

## 2019-08-28 ENCOUNTER — OFFICE VISIT (OUTPATIENT)
Dept: FAMILY MEDICINE CLINIC | Age: 54
End: 2019-08-28
Payer: COMMERCIAL

## 2019-08-28 VITALS
BODY MASS INDEX: 30.67 KG/M2 | HEIGHT: 66 IN | RESPIRATION RATE: 16 BRPM | DIASTOLIC BLOOD PRESSURE: 82 MMHG | WEIGHT: 190.8 LBS | SYSTOLIC BLOOD PRESSURE: 128 MMHG | OXYGEN SATURATION: 98 % | HEART RATE: 87 BPM | TEMPERATURE: 98.9 F

## 2019-08-28 DIAGNOSIS — Z00.00 WELL ADULT EXAM: Primary | ICD-10-CM

## 2019-08-28 DIAGNOSIS — E03.9 HYPOTHYROIDISM, UNSPECIFIED TYPE: ICD-10-CM

## 2019-08-28 LAB
ANION GAP SERPL CALCULATED.3IONS-SCNC: 14 MMOL/L (ref 3–16)
BUN BLDV-MCNC: 19 MG/DL (ref 7–20)
CALCIUM SERPL-MCNC: 9.7 MG/DL (ref 8.3–10.6)
CHLORIDE BLD-SCNC: 102 MMOL/L (ref 99–110)
CHOLESTEROL, TOTAL: 212 MG/DL (ref 0–199)
CO2: 24 MMOL/L (ref 21–32)
CREAT SERPL-MCNC: 1 MG/DL (ref 0.6–1.1)
GFR AFRICAN AMERICAN: >60
GFR NON-AFRICAN AMERICAN: 58
GLUCOSE BLD-MCNC: 115 MG/DL (ref 70–99)
HDLC SERPL-MCNC: 65 MG/DL (ref 40–60)
LDL CHOLESTEROL CALCULATED: 128 MG/DL
POTASSIUM SERPL-SCNC: 4.8 MMOL/L (ref 3.5–5.1)
SODIUM BLD-SCNC: 140 MMOL/L (ref 136–145)
TRIGL SERPL-MCNC: 95 MG/DL (ref 0–150)
TSH SERPL DL<=0.05 MIU/L-ACNC: 3.51 UIU/ML (ref 0.27–4.2)
VLDLC SERPL CALC-MCNC: 19 MG/DL

## 2019-08-28 PROCEDURE — 99396 PREV VISIT EST AGE 40-64: CPT | Performed by: FAMILY MEDICINE

## 2019-08-28 PROCEDURE — 36415 COLL VENOUS BLD VENIPUNCTURE: CPT | Performed by: FAMILY MEDICINE

## 2019-08-28 ASSESSMENT — ENCOUNTER SYMPTOMS
NAUSEA: 0
TROUBLE SWALLOWING: 0
ABDOMINAL PAIN: 0
VOMITING: 0
WHEEZING: 0
EYE ITCHING: 0
SHORTNESS OF BREATH: 0
RHINORRHEA: 0
EYE REDNESS: 0
CHEST TIGHTNESS: 0

## 2019-09-09 RX ORDER — OMEPRAZOLE 20 MG/1
CAPSULE, DELAYED RELEASE ORAL
Qty: 30 CAPSULE | Refills: 5 | Status: SHIPPED | OUTPATIENT
Start: 2019-09-09 | End: 2021-11-17 | Stop reason: SDUPTHER

## 2019-09-24 DIAGNOSIS — R51.9 NONINTRACTABLE HEADACHE, UNSPECIFIED CHRONICITY PATTERN, UNSPECIFIED HEADACHE TYPE: ICD-10-CM

## 2019-09-24 RX ORDER — BUTALBITAL, ACETAMINOPHEN AND CAFFEINE 50; 325; 40 MG/1; MG/1; MG/1
1 TABLET ORAL EVERY 6 HOURS PRN
Qty: 30 TABLET | Refills: 1 | Status: SHIPPED | OUTPATIENT
Start: 2019-09-24 | End: 2020-12-29

## 2019-09-24 RX ORDER — SUMATRIPTAN 100 MG/1
TABLET, FILM COATED ORAL
Qty: 9 TABLET | Refills: 0 | Status: SHIPPED | OUTPATIENT
Start: 2019-09-24 | End: 2019-11-24 | Stop reason: SDUPTHER

## 2019-09-24 RX ORDER — OMEPRAZOLE 20 MG/1
CAPSULE, DELAYED RELEASE ORAL
Qty: 30 CAPSULE | Refills: 2 | Status: SHIPPED | OUTPATIENT
Start: 2019-09-24 | End: 2020-05-21

## 2019-11-04 RX ORDER — BUPROPION HYDROCHLORIDE 300 MG/1
300 TABLET ORAL EVERY MORNING
Qty: 90 TABLET | Refills: 1 | Status: SHIPPED | OUTPATIENT
Start: 2019-11-04 | End: 2020-05-11

## 2019-11-04 RX ORDER — BUPROPION HYDROCHLORIDE 150 MG/1
150 TABLET ORAL EVERY MORNING
Qty: 90 TABLET | Refills: 1 | Status: SHIPPED | OUTPATIENT
Start: 2019-11-04 | End: 2020-05-11

## 2019-11-09 DIAGNOSIS — E03.9 HYPOTHYROIDISM, UNSPECIFIED TYPE: ICD-10-CM

## 2019-11-11 RX ORDER — LEVOTHYROXINE SODIUM 175 UG/1
TABLET ORAL
Qty: 90 TABLET | Refills: 2 | Status: SHIPPED | OUTPATIENT
Start: 2019-11-11 | End: 2020-05-21

## 2019-11-25 RX ORDER — SUMATRIPTAN 100 MG/1
TABLET, FILM COATED ORAL
Qty: 9 TABLET | Refills: 0 | Status: SHIPPED | OUTPATIENT
Start: 2019-11-25 | End: 2020-01-22

## 2019-12-03 RX ORDER — ESCITALOPRAM OXALATE 20 MG/1
TABLET ORAL
Qty: 30 TABLET | Refills: 5 | Status: SHIPPED | OUTPATIENT
Start: 2019-12-03 | End: 2020-05-21

## 2020-01-22 RX ORDER — SUMATRIPTAN 100 MG/1
TABLET, FILM COATED ORAL
Qty: 9 TABLET | Refills: 0 | Status: SHIPPED | OUTPATIENT
Start: 2020-01-22 | End: 2020-05-27

## 2020-02-09 NOTE — PROGRESS NOTES
Group: [facility]         CONSULT NOTE    Patient Identification:  Mandy Alarcon  88 y.o.  female  5/30/1931  [id number]            Requesting physician: Dr. Amadou Das    Reason for Consultation: Abnormal chest x-ray    CC: Shortness of breath, abnormal chest x-ray    History of Present Illness:  88-year-old obese female who has been in the hospital for about 1 week for pancreatitis.  Chest x-ray is abnormal.  I directly visualized the images and it shows poorly inflated lungs, linear discoid atelectasis and small pleural effusion in the fissure on the right side and possible small pleural effusion on the left side.  Patient has been trying to ambulate some with physical therapy but she is still very weak.  She complains of some back discomfort when she takes a deep breath but denies any fever or chills.  No cough.  No sputum production.  No hemoptysis.  She complains of swelling of her legs.  This has been worsening over the past few days.  She has been on Levaquin and doxycycline.  In total she has been on adequate antibiotic therapy for at least 6 to 7 days.    I discussed the case with Dr. Das.  I reviewed notes pertaining to this admission.  No old discharge summary or older records available in this E HR system.      Review of Systems   Constitutional: Positive for fatigue. Negative for diaphoresis and fever.   HENT: Negative for ear discharge and sore throat.    Eyes: Negative for pain and visual disturbance.   Respiratory: Positive for shortness of breath. Negative for cough.    Cardiovascular: Positive for leg swelling. Negative for chest pain.   Gastrointestinal: Positive for abdominal pain. Negative for diarrhea.   Endocrine: Negative for cold intolerance and polyuria.   Genitourinary: Negative for dysuria and hematuria.   Musculoskeletal: Positive for back pain. Negative for joint swelling and myalgias.   Skin: Negative for rash and wound.   Neurological: Positive for weakness.  Negative for speech difficulty and numbness.   Hematological: Negative for adenopathy. Does not bruise/bleed easily.   Psychiatric/Behavioral: Negative for agitation and confusion.       Past Medical History:  Past Medical History:   Diagnosis Date   • Benign essential hypertension 10/28/2012    2012--treatment for hypertension begun.   • Bilateral sensorineural hearing loss, wears hearing aids 2017    Left is much worse than right.  Patient had multiple ear infections as a child.   • Chronic renal insufficiency, stage II (mild), 2016--creatinine 1.35 2016--creatinine 1.35.  Baseline creatinine approximately 1.3.   • Claustrophobia 2016    This patient has significant nocturnal hypoxemia and I think that she could benefit from nocturnal oxygen therapy. The exact etiology of her hypoxemia is not clear. She could possibly have obstructive sleep apnea but this is not documented. We cannot test this patient for sleep apnea due to the fact that she is severely claustrophobic. Patient was a former smoker and it is possibly that COPD he is playing a role.   • Family history of coronary artery disease 2016    Patient's mother, father, 2 sisters and a brother all  from myocardial infarctions   • Gout 10/28/2012    2012--initial diagnosis and treatment of gout.   • Hyperlipidemia 10/28/2012    2012--treatment for hyperlipidemia begun.   • Impaired fasting glucose 10/28/2012    2012--initial diagnosis impaired fasting glucose.   • Morbidly obese (CMS/[facility]) 3/11/2019   • Nocturnal hypoxemia 2014--patient did not receive nocturnal oxygen because of Medicare regulations.   05/15/2014--overnight oximetry revealed oxygen saturations less than 89% for 22 minutes and 40 seconds. Oxygen saturations less than or equal to 88% for 22 minutes and 40 seconds. Lowest oxygen saturation 83%. The longest continuous time with oxygen saturations less  erythema. No pallor. Psychiatric: She has a normal mood and affect. Her behavior is normal. Judgment and thought content normal.   Nursing note and vitals reviewed. Assessment:       Diagnosis Orders   1. Well adult exam     2. Depression with anxiety     3. Chronic nonintractable headache, unspecified headache type     4. Nonintractable headache, unspecified chronicity pattern, unspecified headache type  butalbital-acetaminophen-caffeine (FIORICET, ESGIC) -40 MG per tablet   5. Panic attacks  clonazePAM (KLONOPIN) 1 MG tablet   6. Pre-diabetes     7. Class 1 obesity due to excess calories without serious comorbidity with body mass index (BMI) of 30.0 to 30.9 in adult             Plan:      Well adult:    . Doing well, encourage healthy diet, exercise, safety    . Screening labs orders given   . Preventive: Tdap utd   . 2. Deteriorated  Take down Cymbalta  After this medication is stopped she will restart Lexapro and continue Wellbutrin. Follow-up in 2 months  If her symptoms fail to improve I will refer her to see a psychiatrist  patient agrees and verbalizes understanding    3 and 4 I advised her to take Indocin only for severe pains to prevent GI bleed and take omeprazole if she is to take this medication twice a day as instructed by neurologist  Refill Fioricet  patient has been instructed caution with driving or handling of heavy machinery while taking  this medication as it  can cause drowsiness,  patient agrees and verbalizes understanding       5. Stable  Changes med to SSRI  Refill Klonopin  Controlled Substances Monitoring: Attestation: The Prescription Monitoring Report for this patient was reviewed today. Danielle Salgado MD)  Documentation: No signs of potential drug abuse or diversion identified.  Danielle Salgado MD)    patient has been instructed caution with driving or handling of heavy machinery while taking  this medication as it  can cause drowsiness,  patient agrees and verbalizes than or equal to 88% was 1 minute and 32 seconds.   08/02/2012--overnight oximetry revealed oxygen saturations less than 90% for one hour and 35 minutes. Oxygen saturations less than 89% 59 minutes. This patient has significant nocturnal hypoxemia and I think that she could benefit from nocturnal oxygen therapy. The exact etiology of her hypoxemia is not clear. She could possibly have obstructive sleep apnea but this is not documented. We cannot test this patient for sleep apnea due to the fact that she is severely claustrophobic. Patient was a former smoker and it is possibly that COPD he is playing a role.   • Primary hypothyroidism 11/17/2015 March 11, 2019--TSH remains elevated slightly at 4.92.  Given the overall clinical picture including the multinodular goiter, we will initiate levothyroxine 50 mcg/day and reassess in about 6 weeks.  August 1, 2018--thyroid ultrasound reveals a multinodular thyroid with multiple subcentimeter nodules.  Only minimal increase in size of the largest nodule in the left lobe has occurred when compared    • Secondary polycythemia 8/2/2012 11/06/2014--patient did not receive nocturnal oxygen because of Medicare regulations.   05/15/2014--overnight oximetry revealed oxygen saturations less than 89% for 22 minutes and 40 seconds. Oxygen saturations less than or equal to 88% for 22 minutes and 40 seconds. Lowest oxygen saturation 83%. The longest continuous time with oxygen saturations less than or equal to 88% was 1 minute and 32 seconds.   08/02/2012--overnight oximetry revealed oxygen saturations less than 90% for one hour and 35 minutes. Oxygen saturations less than 89% 59 minutes. This patient has significant nocturnal hypoxemia and I think that she could benefit from nocturnal oxygen therapy. The exact etiology of her hypoxemia is not clear. She could possibly have obstructive sleep apnea but this is not documented. We cannot test this patient for sleep apnea due to the  fact that she is severely claustrophobic. Patient was a former smoker and it is possibly that COPD he is playing a role.   • Vitamin D deficiency 5/23/2016       Past Surgical History:  Past Surgical History:   Procedure Laterality Date   • OOPHORECTOMY      age 50   • RADICAL ABDOMINAL HYSTERECTOMY  50 years old    50 years of age. Uterine fibroid tumors with menorrhagia - no cancer        Home Meds:  Medications Prior to Admission   Medication Sig Dispense Refill Last Dose   • allopurinol (ZYLOPRIM) 300 MG tablet Take 1 p.o. daily for gout 90 tablet 3    • Cholecalciferol (VITAMIN D) 2000 UNITS tablet Take 2 tablets by mouth daily.   Taking   • clobetasol (TEMOVATE) 0.05 % ointment Apply  topically to the appropriate area as directed 2 (Two) Times a Day. 15 g 0 Taking   • levothyroxine (SYNTHROID, LEVOTHROID) 50 MCG tablet Take 1 p.o. every morning for thyroid 90 tablet 3    • lisinopril-hydrochlorothiazide (PRINZIDE,ZESTORETIC) 20-12.5 MG per tablet TAKE ONE TABLET BY MOUTH EVERY MORNING FOR HIGH BLOOD PRESSURE AND LEG SWELLING 90 tablet 3    • pravastatin (PRAVACHOL) 40 MG tablet Take 1 p.o. daily for high cholesterol 90 tablet 3    • Biotin 10 MG tablet Take 1 tablet by mouth Daily.   Not Taking   • halobetasol (ULTRAVATE) 0.05 % ointment Apply pea-size amount daily to affected area for 2 months. 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   Substance and Sexual Activity   • Alcohol use: No   • Drug use: No   • Sexual activity: Not Currently     Partners: Male   Lifestyle   • Physical activity:     Days per week: 0 days     Minutes per session: 0 min   • Stress: Not at all   Relationships   • Social connections:     Talks on phone: More than three times a week     Gets together: Three times a week     Attends [employer] service: More than 4 times per year     Active member of club or organization: No     Attends meetings of clubs or organizations: Never     Relationship status:        Family History:  Family History   Problem Relation Age of Onset   • Heart disease Mother         Ischemic.  from coronary artery disease.   • Heart disease Father         Ischemic.  from coronary artery disease.   • Heart disease Sister         Ischemic.  from coronary artery disease.   • Heart disease Brother         Ischemic.  from coronary artery disease.   • Heart disease Sister         Ischemic.  from coronary artery disease.   • Breast cancer Daughter        Physical Exam:  /75 (BP Location: Right arm, Patient Position: Sitting)   Pulse 79   Temp 97.7 °F (36.5 °C) (Oral)   Resp 16   Ht 160 cm (63\")   Wt 112 kg (247 lb)   SpO2 97%   BMI 43.75 kg/m²  Body mass index is 43.75 kg/m². 97% 112 kg (247 lb)  Physical Exam   Constitutional: No distress.   HENT:   Right Ear: External ear normal.   Left Ear: External ear normal.   Nose: Nose normal.   Mouth/Throat: Oropharynx is clear and moist.   Eyes: Pupils are equal, round, and reactive to light. Conjunctivae and EOM are normal.   Neck: No JVD present. No tracheal deviation present. " No thyromegaly present.   Cardiovascular: Normal rate, regular rhythm and normal heart sounds.   No murmur heard.  2+ bilateral lower extremity edema   Pulmonary/Chest: Effort normal and breath sounds normal.   No use of accessory muscles, no dullness to percussion.  No wheezing.  Overall diminished breath sounds bilaterally but no abnormal adventitious sounds   Abdominal: Soft.   Nontender, rest of the exam is hampered by obese abdomen   Musculoskeletal: Normal range of motion. She exhibits no deformity.   Neurological: No cranial nerve deficit or sensory deficit.   Skin: Skin is warm. No rash noted.   No palpable nodules   Psychiatric: She has a normal mood and affect. Thought content normal.       LABS:  Lab Results   Component Value Date    CALCIUM 8.5 (L) 02/09/2020    PHOS 3.3 02/08/2020     Results from last 7 days   Lab Units 02/09/20  0631 02/08/20  0504 02/07/20  0457  02/03/20  0024   MAGNESIUM mg/dL 1.8 1.5* 1.8   < >  --    SODIUM mmol/L 134* 134* 135*   < >  --    POTASSIUM mmol/L 4.0 3.5 4.0   < >  --    CHLORIDE mmol/L 105 104 103   < >  --    CO2 mmol/L 17.2* 15.9* 17.6*   < >  --    BUN mg/dL 30* 34* 39*   < >  --    CREATININE mg/dL 1.26* 1.29* 1.41*   < >  --    GLUCOSE mg/dL 82 99 89   < >  --    CALCIUM mg/dL 8.5* 8.3* 8.3*   < >  --    WBC 10*3/mm3 23.84* 20.62* 14.41*   < >  --    HEMOGLOBIN g/dL 13.2 12.8 12.7   < >  --    PLATELETS 10*3/mm3 295 258 208   < >  --    ALT (SGPT) U/L 18 17 15   < >  --    AST (SGOT) U/L 25 23 31   < >  --    PROCALCITONIN ng/mL  --   --   --   --  12.61*    < > = values in this interval not displayed.     Lab Results   Component Value Date    CKTOTAL 98 08/20/2019    TROPONINT <0.010 01/31/2020         Results from last 7 days   Lab Units 02/03/20  0211 02/03/20  0158   BLOODCX  No growth at 5 days No growth at 5 days     Results from last 7 days   Lab Units 02/03/20  0531 02/03/20  0024   PROCALCITONIN ng/mL  --  12.61*   LACTATE mmol/L 1.3 2.5*                  Results from last 7 days   Lab Units 02/03/20  0211 02/03/20  0158   BLOODCX  No growth at 5 days No growth at 5 days     Lab Results   Component Value Date    TSH 2.030 02/05/2020     Estimated Creatinine Clearance: 38.6 mL/min (A) (by C-G formula based on SCr of 1.26 mg/dL (H)).         Imaging: I personally visualized the images of chest x-ray.  Please see above for details.      Assessment:  Acute pancreatitis without infection or necrosis  Pleural effusion  Atelectasis  Shortness of breath on exertion  Elevated white count      Recommendations:  Discussed with Dr. Das.  Patient has received adequate treatment with proper antibiotics for possible pneumonia.  No need to add additional antibiotic therapy at this time.  Repeat procalcitonin tomorrow and check trend.  Most likely will be elevated because of acute pancreatitis but would be reassuring to see a downward trend.  Patient's chest x-ray indicates pleural effusion in fissure as well as some basilar atelectasis, which is to be expected in the setting of acute pancreatitis.  It is extremely common to develop pleural effusions and poor diaphragmatic mobility due to neighboring inflammation of the pancreas.  This will take weeks if not months to resolve.  Currently patient not hypoxic, not requiring supplemental oxygen.  Perhaps some diuretics for a few days might help her pleural effusions and her lower extremity edema.        Cruz Ramos MD  2/9/2020  3:11 PM      Much of this encounter note is an electronic transcription/translation of spoken language to printed text using Dragon Software.

## 2020-02-25 RX ORDER — AMITRIPTYLINE HYDROCHLORIDE 25 MG/1
TABLET, FILM COATED ORAL
Qty: 30 TABLET | Refills: 0 | Status: SHIPPED | OUTPATIENT
Start: 2020-02-25 | End: 2020-03-16

## 2020-03-16 RX ORDER — AMITRIPTYLINE HYDROCHLORIDE 25 MG/1
TABLET, FILM COATED ORAL
Qty: 30 TABLET | Refills: 0 | Status: SHIPPED | OUTPATIENT
Start: 2020-03-16 | End: 2020-03-23 | Stop reason: SDUPTHER

## 2020-03-23 ENCOUNTER — TELEPHONE (OUTPATIENT)
Dept: FAMILY MEDICINE CLINIC | Age: 55
End: 2020-03-23

## 2020-03-23 RX ORDER — AMITRIPTYLINE HYDROCHLORIDE 25 MG/1
TABLET, FILM COATED ORAL
Qty: 30 TABLET | Refills: 1 | Status: SHIPPED | OUTPATIENT
Start: 2020-03-23 | End: 2020-03-24 | Stop reason: SDUPTHER

## 2020-03-23 NOTE — TELEPHONE ENCOUNTER
rx transmitted electronically to the pharmacy via [facility]   Let patient know and verify pharmacy

## 2020-03-24 RX ORDER — AMITRIPTYLINE HYDROCHLORIDE 25 MG/1
TABLET, FILM COATED ORAL
Qty: 90 TABLET | Refills: 0 | Status: SHIPPED | OUTPATIENT
Start: 2020-03-24 | End: 2020-04-21

## 2020-04-21 RX ORDER — AMITRIPTYLINE HYDROCHLORIDE 25 MG/1
TABLET, FILM COATED ORAL
Qty: 90 TABLET | Refills: 0 | Status: SHIPPED | OUTPATIENT
Start: 2020-04-21 | End: 2020-05-11 | Stop reason: SDUPTHER

## 2020-05-11 ENCOUNTER — TELEMEDICINE (OUTPATIENT)
Dept: FAMILY MEDICINE CLINIC | Age: 55
End: 2020-05-11
Payer: COMMERCIAL

## 2020-05-11 PROCEDURE — 99213 OFFICE O/P EST LOW 20 MIN: CPT | Performed by: FAMILY MEDICINE

## 2020-05-11 RX ORDER — BUPROPION HYDROCHLORIDE 300 MG/1
300 TABLET ORAL EVERY MORNING
Qty: 30 TABLET | Refills: 0 | Status: SHIPPED | OUTPATIENT
Start: 2020-05-11 | End: 2020-06-11

## 2020-05-11 RX ORDER — AMITRIPTYLINE HYDROCHLORIDE 25 MG/1
TABLET, FILM COATED ORAL
Qty: 270 TABLET | Refills: 1 | Status: SHIPPED | OUTPATIENT
Start: 2020-05-11 | End: 2020-12-16

## 2020-05-11 RX ORDER — BUPROPION HYDROCHLORIDE 150 MG/1
150 TABLET ORAL EVERY MORNING
Qty: 30 TABLET | Refills: 0 | Status: SHIPPED | OUTPATIENT
Start: 2020-05-11 | End: 2020-05-11

## 2020-05-11 RX ORDER — PROPRANOLOL HYDROCHLORIDE 10 MG/1
10 TABLET ORAL 2 TIMES DAILY
Qty: 90 TABLET | Refills: 1
Start: 2020-05-11

## 2020-05-11 NOTE — PROGRESS NOTES
visit) encounter to address concerns as mentioned above. A caregiver was present when appropriate. Due to this being a TeleHealth encounter (During [facility]-03 public health emergency), evaluation of the following organ systems was limited: Vitals/Constitutional/EENT/Resp/CV/GI//MS/Neuro/Skin/Heme-Lymph-Imm. Pursuant to the emergency declaration under the [address] [place] [address] and the [employer] and [employer] Act, this Virtual Visit was conducted with patient's (and/or legal guardian's) consent, to reduce the patient's risk of exposure to COVID-19 and provide necessary medical care. The patient (and/or legal guardian) has also been advised to contact this office for worsening conditions or problems, and seek emergency medical treatment and/or call 911 if deemed necessary. Patient identification was verified at the start of the visit: Yes    Total time spent for this encounter: Not billed by time    Services were provided through a video synchronous discussion virtually to substitute for in-person clinic visit. Patient and provider were located at their individual homes. --Silvia Jaramillo MD on 5/11/2020 at 2:39 PM    An electronic signature was used to authenticate this note.             Silvia Jaramillo MD

## 2020-05-21 RX ORDER — LEVOTHYROXINE SODIUM 175 UG/1
TABLET ORAL
Qty: 90 TABLET | Refills: 2 | Status: SHIPPED | OUTPATIENT
Start: 2020-05-21 | End: 2021-03-04

## 2020-05-21 RX ORDER — OMEPRAZOLE 20 MG/1
CAPSULE, DELAYED RELEASE ORAL
Qty: 30 CAPSULE | Refills: 2 | Status: SHIPPED | OUTPATIENT
Start: 2020-05-21 | End: 2020-08-17

## 2020-05-21 RX ORDER — ESCITALOPRAM OXALATE 20 MG/1
TABLET ORAL
Qty: 30 TABLET | Refills: 5 | Status: SHIPPED | OUTPATIENT
Start: 2020-05-21 | End: 2020-12-21

## 2020-05-27 RX ORDER — SUMATRIPTAN 100 MG/1
TABLET, FILM COATED ORAL
Qty: 9 TABLET | Refills: 0 | Status: SHIPPED | OUTPATIENT
Start: 2020-05-27 | End: 2020-06-22

## 2020-06-11 RX ORDER — BUPROPION HYDROCHLORIDE 300 MG/1
TABLET ORAL
Qty: 30 TABLET | Refills: 0 | Status: SHIPPED | OUTPATIENT
Start: 2020-06-11 | End: 2020-06-17 | Stop reason: SDUPTHER

## 2020-06-18 RX ORDER — BUPROPION HYDROCHLORIDE 300 MG/1
TABLET ORAL
Qty: 90 TABLET | Refills: 2 | Status: SHIPPED | OUTPATIENT
Start: 2020-06-18 | End: 2021-07-13

## 2020-06-22 RX ORDER — SUMATRIPTAN 100 MG/1
TABLET, FILM COATED ORAL
Qty: 9 TABLET | Refills: 0 | Status: SHIPPED | OUTPATIENT
Start: 2020-06-22 | End: 2020-07-20

## 2020-07-20 RX ORDER — SUMATRIPTAN 100 MG/1
TABLET, FILM COATED ORAL
Qty: 9 TABLET | Refills: 0 | Status: SHIPPED | OUTPATIENT
Start: 2020-07-20 | End: 2021-01-28

## 2020-08-17 RX ORDER — OMEPRAZOLE 20 MG/1
CAPSULE, DELAYED RELEASE ORAL
Qty: 30 CAPSULE | Refills: 2 | Status: SHIPPED | OUTPATIENT
Start: 2020-08-17 | End: 2020-12-21

## 2020-12-16 RX ORDER — AMITRIPTYLINE HYDROCHLORIDE 25 MG/1
TABLET, FILM COATED ORAL
Qty: 90 TABLET | Refills: 5 | Status: SHIPPED | OUTPATIENT
Start: 2020-12-16 | End: 2021-07-12

## 2020-12-21 RX ORDER — ESCITALOPRAM OXALATE 20 MG/1
TABLET ORAL
Qty: 30 TABLET | Refills: 5 | Status: SHIPPED | OUTPATIENT
Start: 2020-12-21 | End: 2021-07-12

## 2020-12-21 RX ORDER — OMEPRAZOLE 20 MG/1
CAPSULE, DELAYED RELEASE ORAL
Qty: 30 CAPSULE | Refills: 2 | Status: SHIPPED | OUTPATIENT
Start: 2020-12-21 | End: 2021-01-15 | Stop reason: CLARIF

## 2020-12-29 RX ORDER — BUTALBITAL, ACETAMINOPHEN AND CAFFEINE 50; 325; 40 MG/1; MG/1; MG/1
1 TABLET ORAL EVERY 6 HOURS PRN
Qty: 30 TABLET | Refills: 1 | Status: SHIPPED | OUTPATIENT
Start: 2020-12-29 | End: 2021-10-08 | Stop reason: SDUPTHER

## 2021-01-14 ENCOUNTER — TELEPHONE (OUTPATIENT)
Dept: FAMILY MEDICINE CLINIC | Age: 56
End: 2021-01-14

## 2021-01-14 DIAGNOSIS — F41.0 PANIC ATTACKS: ICD-10-CM

## 2021-01-14 RX ORDER — CLONAZEPAM 1 MG/1
1 TABLET ORAL 2 TIMES DAILY PRN
Qty: 60 TABLET | Refills: 0 | Status: CANCELLED | OUTPATIENT
Start: 2021-01-14 | End: 2021-02-13

## 2021-01-14 NOTE — TELEPHONE ENCOUNTER
Refill request:    clonazePAM (KLONOPIN) 1 MG tablet     OV: 5/11/2020    Please advise.      [address] [place]    [address] [address]   Phone:  [phone]  Fax:  [phone]

## 2021-01-14 NOTE — TELEPHONE ENCOUNTER
Pt has been informed and scheduled. Tacrolimus level 18.4 at 12 hours, on 8-19-19  Goal 8-10.   Current dose 3 mg in AM, 2.5 mg in PM  Please hold your dose tonight (8-22-19)  Start tomorrow with new dose of 2 mg in AM, 2 mg in PM   Recheck level in 8-26-19  Discussed with patient  [employer] message sent

## 2021-01-15 ENCOUNTER — TELEMEDICINE (OUTPATIENT)
Dept: FAMILY MEDICINE CLINIC | Age: 56
End: 2021-01-15
Payer: COMMERCIAL

## 2021-01-15 DIAGNOSIS — Z11.59 NEED FOR HEPATITIS C SCREENING TEST: ICD-10-CM

## 2021-01-15 DIAGNOSIS — E03.9 HYPOTHYROIDISM, UNSPECIFIED TYPE: ICD-10-CM

## 2021-01-15 DIAGNOSIS — F41.0 PANIC ATTACKS: Primary | ICD-10-CM

## 2021-01-15 DIAGNOSIS — Z13.1 SCREENING FOR DIABETES MELLITUS: ICD-10-CM

## 2021-01-15 DIAGNOSIS — Z13.220 SCREENING, LIPID: ICD-10-CM

## 2021-01-15 PROCEDURE — 99214 OFFICE O/P EST MOD 30 MIN: CPT | Performed by: FAMILY MEDICINE

## 2021-01-15 RX ORDER — CLONAZEPAM 1 MG/1
1 TABLET ORAL 2 TIMES DAILY PRN
Qty: 60 TABLET | Refills: 0 | Status: SHIPPED | OUTPATIENT
Start: 2021-01-15 | End: 2021-10-08 | Stop reason: SDUPTHER

## 2021-01-15 ASSESSMENT — PATIENT HEALTH QUESTIONNAIRE - PHQ9
SUM OF ALL RESPONSES TO PHQ QUESTIONS 1-9: 2
SUM OF ALL RESPONSES TO PHQ QUESTIONS 1-9: 2
2. FEELING DOWN, DEPRESSED OR HOPELESS: 1

## 2021-01-15 ASSESSMENT — ENCOUNTER SYMPTOMS
TROUBLE SWALLOWING: 0
CHEST TIGHTNESS: 0
VOICE CHANGE: 0
CONSTIPATION: 0
ABDOMINAL PAIN: 0
SHORTNESS OF BREATH: 0
CHOKING: 0

## 2021-01-15 NOTE — PROGRESS NOTES
Subjective:      Patient ID: Ángel Martin is a 50 y.o. female. HPI     Ángel Martin is a 50 y.o. female being evaluated by a Virtual Visit (video visit) encounter to address concerns as mentioned above. A caregiver was present when appropriate. Due to this being a TeleHealth encounter (During [facility] public health emergency), evaluation of the following organ systems was limited: Vitals/Constitutional/EENT/Resp/CV/GI//MS/Neuro/Skin/Heme-Lymph-Imm. Pursuant to the emergency declaration under the [address] [place] [address], [address] [place] [address] authority and the [employer] Act, this Virtual Visit was conducted with patient's (and/or legal guardian's) consent, to reduce the patient's risk of exposure to COVID-19 and provide necessary medical care. The patient (and/or legal guardian) has also been advised to contact this office for worsening conditions or problems, and seek emergency medical treatment and/or call 911 if deemed necessary. Patient identification was verified at the start of the visit: Yes    Total time spent for this encounter: Not billed by time    Services were provided through a video synchronous discussion virtually to substitute for in-person clinic visit. Patient and provider were located at their individual homes. --Melida Negrete MD on 1/15/2021 at 9:39 AM    An electronic signature was used to authenticate this note. Anxiety  Patient is here for fu of anxiety and get refills   She has the following anxiety symptoms: chest pain, palpitations, shortness of breath. Onset of symptoms was approximately several months ago. Symptoms have been gradually worsening since that time. She denies current suicidal and homicidal ideation. Family history significant for no psychiatric illness. Possible organic causes contributing are: none.  Risk factors: negative life event stress at home Previous treatment includes medication benzodiazepines klonopin prn. She complains of the following medication side effects: none. Hypothyroidism:  Now on replacement therapy, denies any fatigue, slow thought process, tremor, hair loss, diaphoresis, heat/cold intolerance, wt gain/loss, diarrhea/constipation. No resent TSH check up    Review of Systems   Constitutional: Negative for activity change, appetite change, fatigue and unexpected weight change. HENT: Negative for trouble swallowing and voice change. Eyes: Negative for visual disturbance. Respiratory: Negative for choking, chest tightness and shortness of breath. Cardiovascular: Negative for palpitations and leg swelling. Gastrointestinal: Negative for abdominal pain and constipation. Endocrine: Negative for cold intolerance and heat intolerance. Musculoskeletal: Negative for arthralgias, myalgias and neck pain. Skin: Negative for rash. Neurological: Negative for weakness, light-headedness and headaches. Hematological: Negative for adenopathy. Psychiatric/Behavioral: Positive for sleep disturbance. Negative for decreased concentration and dysphoric mood. The patient is nervous/anxious. is allergic to heparin. Current Outpatient Medications:     clonazePAM (KLONOPIN) 1 MG tablet, Take 1 tablet by mouth 2 times daily as needed for Anxiety for up to 30 days. , Disp: 60 tablet, Rfl: 0    butalbital-acetaminophen-caffeine (FIORICET, ESGIC) -40 MG per tablet, TAKE 1 TABLET BY MOUTH EVERY 6 HOURS AS NEEDED FOR HEADACHES, Disp: 30 tablet, Rfl: 1    escitalopram (LEXAPRO) 20 MG tablet, TAKE 1 TABLET BY MOUTH EVERY DAY, Disp: 30 tablet, Rfl: 5    amitriptyline (ELAVIL) 25 MG tablet, TAKE 3 TABLETS BY MOUTH AT BEDTIME, Disp: 90 tablet, Rfl: 5    SUMAtriptan (IMITREX) 100 MG tablet, TAKE ONE TABLET BY MOUTH AT ONSET OF HEADACHE.  MAY REPEAT IN 2 HOURS MAX OF 2 TABS PER 24 HOURS, Disp: 9 tablet, Rfl: 0    buPROPion (WELLBUTRIN XL) 300 MG extended release tablet, TAKE 1 TABLET BY MOUTH EVERY DAY IN THE MORNING, Disp: 90 tablet, Rfl: 2    levothyroxine (SYNTHROID) 175 MCG tablet, TAKE 1 TABLET BY MOUTH EVERY DAY, Disp: 90 tablet, Rfl: 2    propranolol (INDERAL) 10 MG tablet, Take 1 tablet by mouth 2 times daily, Disp: 90 tablet, Rfl: 1    omeprazole (PRILOSEC) 20 MG delayed release capsule, TAKE 1 CAPSULE BY MOUTH EVERY DAY, Disp: 30 capsule, Rfl: 5    primidone (MYSOLINE) 50 MG tablet, Take 100 mg by mouth nightly., Disp: , Rfl:      has a past medical history of Anxiety, Breast cancer (HCC), Cervical disc disorder at C6-C7 level with myelopathy, Cervical spondylarthritis, Class 1 obesity due to excess calories without serious comorbidity with body mass index (BMI) of 30.0 to 30.9 in adult, Depression, Hypothyroidism, Migraine, Neuropathy, TMJ (temporomandibular joint syndrome), TMJ disease, and Urinary incontinence. Past Surgical History:   Procedure Laterality Date    BREAST LUMPECTOMY      right at  46 yo     KNEE SURGERY Right 2013    SPINAL FUSION  2013    TONSILLECTOMY          reports that she has never smoked. She has never used smokeless tobacco. She reports current alcohol use.    family history includes Cancer in her mother; High Blood Pressure in her brother; Mental Illness in her mother; Other in her brother and mother. Objective:  No BP reported  Wt 185  5'5\"     Physical Exam  Vitals signs and nursing note reviewed. Constitutional:       General: She is not in acute distress. Appearance: Normal appearance. She is not ill-appearing, toxic-appearing or diaphoretic. HENT:      Head: Normocephalic and atraumatic. Neck:      Musculoskeletal: Normal range of motion. Pulmonary:      Effort: No respiratory distress. Neurological:      General: No focal deficit present. Mental Status: She is alert and oriented to person, place, and time. Mental status is at baseline.    Psychiatric:         Mood and Affect: Mood normal. Behavior: Behavior normal.         Thought Content: Thought content normal.         Assessment:       Diagnosis Orders   1. Panic attacks  clonazePAM (KLONOPIN) 1 MG tablet   2. Hypothyroidism, unspecified type             Plan:      1. Deteriorated  Resume tx  Refills. Controlled Substances Monitoring: Attestation: The Prescription Monitoring Report for this patient was reviewed today. Dorothy Mendoza MD)  Documentation: No signs of potential drug abuse or diversion identified. Dorothy Mendoza MD)    patient has been instructed caution with driving or handling of heavy machinery while taking  this medication as it  can cause drowsiness,  patient agrees and verbalizes understanding   Fu 3 mo    2.  Needs tsh and lab screening            Manjeet Frey MD

## 2021-01-18 LAB
ANION GAP SERPL CALCULATED.3IONS-SCNC: 12 MMOL/L (ref 3–16)
BUN BLDV-MCNC: 20 MG/DL (ref 7–20)
CALCIUM SERPL-MCNC: 9.4 MG/DL (ref 8.3–10.6)
CHLORIDE BLD-SCNC: 104 MMOL/L (ref 99–110)
CHOLESTEROL, TOTAL: 199 MG/DL (ref 0–199)
CO2: 25 MMOL/L (ref 21–32)
CREAT SERPL-MCNC: 0.7 MG/DL (ref 0.6–1.1)
GFR AFRICAN AMERICAN: >60
GFR NON-AFRICAN AMERICAN: >60
GLUCOSE BLD-MCNC: 110 MG/DL (ref 70–99)
HDLC SERPL-MCNC: 64 MG/DL (ref 40–60)
HEPATITIS C ANTIBODY INTERPRETATION: NORMAL
LDL CHOLESTEROL CALCULATED: 116 MG/DL
POTASSIUM SERPL-SCNC: 4.8 MMOL/L (ref 3.5–5.1)
SODIUM BLD-SCNC: 141 MMOL/L (ref 136–145)
TRIGL SERPL-MCNC: 96 MG/DL (ref 0–150)
TSH SERPL DL<=0.05 MIU/L-ACNC: 3.05 UIU/ML (ref 0.27–4.2)
VLDLC SERPL CALC-MCNC: 19 MG/DL

## 2021-01-19 LAB
ESTIMATED AVERAGE GLUCOSE: 122.6 MG/DL
HBA1C MFR BLD: 5.9 %

## 2021-01-28 RX ORDER — SUMATRIPTAN 100 MG/1
TABLET, FILM COATED ORAL
Qty: 15 TABLET | Refills: 3 | Status: SHIPPED | OUTPATIENT
Start: 2021-01-28 | End: 2021-08-19

## 2021-02-04 ENCOUNTER — TELEPHONE (OUTPATIENT)
Dept: FAMILY MEDICINE CLINIC | Age: 56
End: 2021-02-04

## 2021-02-04 NOTE — TELEPHONE ENCOUNTER
Patient is requesting steroids and albuterol inhaler to help with her breathing. Patient was diagnosed with Covid on Monday. Please advise.      OV: 1/15/21

## 2021-03-26 DIAGNOSIS — E03.9 HYPOTHYROIDISM, UNSPECIFIED TYPE: ICD-10-CM

## 2021-03-26 RX ORDER — LEVOTHYROXINE SODIUM 175 UG/1
TABLET ORAL
Qty: 30 TABLET | Refills: 0 | Status: SHIPPED | OUTPATIENT
Start: 2021-03-26 | End: 2021-03-30

## 2021-03-30 DIAGNOSIS — E03.9 HYPOTHYROIDISM, UNSPECIFIED TYPE: ICD-10-CM

## 2021-03-30 RX ORDER — LEVOTHYROXINE SODIUM 175 UG/1
TABLET ORAL
Qty: 30 TABLET | Refills: 5 | Status: SHIPPED | OUTPATIENT
Start: 2021-03-30 | End: 2021-10-08 | Stop reason: ALTCHOICE

## 2021-07-13 RX ORDER — BUPROPION HYDROCHLORIDE 300 MG/1
TABLET ORAL
Qty: 30 TABLET | Refills: 8 | Status: SHIPPED | OUTPATIENT
Start: 2021-07-13 | End: 2022-05-06

## 2021-07-13 NOTE — TELEPHONE ENCOUNTER
Medication:   Requested Prescriptions     Pending Prescriptions Disp Refills    buPROPion (WELLBUTRIN XL) 300 MG extended release tablet [Pharmacy Med Name: BUPROPION HCL  MG TABLET] 30 tablet 8     Sig: TAKE 1 TABLET BY MOUTH EVERY DAY IN THE MORNING          Patient Phone Number: [phone] (home)     Last appt: 1/15/2021

## 2021-08-17 DIAGNOSIS — M79.2 NEUROLOGICAL PAIN DISORDER: ICD-10-CM

## 2021-08-17 RX ORDER — AMITRIPTYLINE HYDROCHLORIDE 25 MG/1
TABLET, FILM COATED ORAL
Qty: 90 TABLET | Refills: 1 | Status: SHIPPED | OUTPATIENT
Start: 2021-08-17 | End: 2022-02-07

## 2021-08-17 NOTE — TELEPHONE ENCOUNTER
Medication:   Requested Prescriptions     Pending Prescriptions Disp Refills    amitriptyline (ELAVIL) 25 MG tablet [Pharmacy Med Name: AMITRIPTYLINE HCL 25 MG TAB] 90 tablet 1     Sig: TAKE 3 TABLETS BY MOUTH AT BEDTIME            Patient Phone Number: [phone] (home)     Last appt:1/15/21.

## 2021-08-19 RX ORDER — SUMATRIPTAN 100 MG/1
TABLET, FILM COATED ORAL
Qty: 9 TABLET | Refills: 6 | Status: SHIPPED | OUTPATIENT
Start: 2021-08-19

## 2021-09-14 DIAGNOSIS — E03.9 HYPOTHYROIDISM, UNSPECIFIED TYPE: ICD-10-CM

## 2021-09-14 RX ORDER — LEVOTHYROXINE SODIUM 175 UG/1
TABLET ORAL
Qty: 30 TABLET | Refills: 5 | OUTPATIENT
Start: 2021-09-14

## 2021-10-01 ENCOUNTER — TELEPHONE (OUTPATIENT)
Dept: FAMILY MEDICINE CLINIC | Age: 56
End: 2021-10-01

## 2021-10-01 DIAGNOSIS — Z00.00 WELL ADULT EXAM: Primary | ICD-10-CM

## 2021-10-01 NOTE — TELEPHONE ENCOUNTER
Spoke with pt.   Pt states she would like to come in on Monday to get regular physical blood work done and a thyroid test.  Please advise

## 2021-10-01 NOTE — TELEPHONE ENCOUNTER
----- Message from Benito Forman sent at 10/1/2021 11:00 AM EDT -----  Subject: Message to Provider    QUESTIONS  Information for Provider? Pt called needing Blood work done and states its   all the labs for fasting. This will be sent to her husbands work so it has   to be specific. She would like to have it done Monday. Please call her and   let her know when this has been added. ---------------------------------------------------------------------------  --------------  Shanta MELENDEZ  What is the best way for the office to contact you? OK to leave message on   voicemail  Preferred Call Back Phone Number? [phone]  ---------------------------------------------------------------------------  --------------  SCRIPT ANSWERS  Relationship to Patient?  Self

## 2021-10-04 DIAGNOSIS — Z00.00 WELL ADULT EXAM: ICD-10-CM

## 2021-10-04 PROCEDURE — 36415 COLL VENOUS BLD VENIPUNCTURE: CPT | Performed by: FAMILY MEDICINE

## 2021-10-05 LAB
A/G RATIO: 1.5 (ref 1.1–2.2)
ALBUMIN SERPL-MCNC: 4.5 G/DL (ref 3.4–5)
ALP BLD-CCNC: 71 U/L (ref 40–129)
ALT SERPL-CCNC: 25 U/L (ref 10–40)
ANION GAP SERPL CALCULATED.3IONS-SCNC: 14 MMOL/L (ref 3–16)
AST SERPL-CCNC: 22 U/L (ref 15–37)
BILIRUB SERPL-MCNC: <0.2 MG/DL (ref 0–1)
BUN BLDV-MCNC: 19 MG/DL (ref 7–20)
CALCIUM SERPL-MCNC: 9.7 MG/DL (ref 8.3–10.6)
CHLORIDE BLD-SCNC: 104 MMOL/L (ref 99–110)
CHOLESTEROL, TOTAL: 242 MG/DL (ref 0–199)
CO2: 26 MMOL/L (ref 21–32)
CREAT SERPL-MCNC: 1 MG/DL (ref 0.6–1.1)
GFR AFRICAN AMERICAN: >60
GFR NON-AFRICAN AMERICAN: 58
GLOBULIN: 3 G/DL
GLUCOSE BLD-MCNC: 111 MG/DL (ref 70–99)
HDLC SERPL-MCNC: 74 MG/DL (ref 40–60)
LDL CHOLESTEROL CALCULATED: 147 MG/DL
POTASSIUM SERPL-SCNC: 4.8 MMOL/L (ref 3.5–5.1)
SODIUM BLD-SCNC: 144 MMOL/L (ref 136–145)
TOTAL PROTEIN: 7.5 G/DL (ref 6.4–8.2)
TRIGL SERPL-MCNC: 103 MG/DL (ref 0–150)
TSH SERPL DL<=0.05 MIU/L-ACNC: 17.48 UIU/ML (ref 0.27–4.2)
VLDLC SERPL CALC-MCNC: 21 MG/DL

## 2021-10-08 ENCOUNTER — OFFICE VISIT (OUTPATIENT)
Dept: FAMILY MEDICINE CLINIC | Age: 56
End: 2021-10-08
Payer: COMMERCIAL

## 2021-10-08 VITALS
SYSTOLIC BLOOD PRESSURE: 138 MMHG | OXYGEN SATURATION: 99 % | HEART RATE: 83 BPM | WEIGHT: 189 LBS | HEIGHT: 65 IN | TEMPERATURE: 97.3 F | DIASTOLIC BLOOD PRESSURE: 86 MMHG | BODY MASS INDEX: 31.49 KG/M2

## 2021-10-08 DIAGNOSIS — F41.0 PANIC ATTACKS: ICD-10-CM

## 2021-10-08 DIAGNOSIS — E03.9 HYPOTHYROIDISM, UNSPECIFIED TYPE: ICD-10-CM

## 2021-10-08 DIAGNOSIS — R51.9 NONINTRACTABLE HEADACHE, UNSPECIFIED CHRONICITY PATTERN, UNSPECIFIED HEADACHE TYPE: ICD-10-CM

## 2021-10-08 DIAGNOSIS — E78.5 HYPERLIPIDEMIA, UNSPECIFIED HYPERLIPIDEMIA TYPE: ICD-10-CM

## 2021-10-08 DIAGNOSIS — Z00.00 WELL ADULT EXAM: Primary | ICD-10-CM

## 2021-10-08 PROCEDURE — 99213 OFFICE O/P EST LOW 20 MIN: CPT | Performed by: FAMILY MEDICINE

## 2021-10-08 PROCEDURE — 99396 PREV VISIT EST AGE 40-64: CPT | Performed by: FAMILY MEDICINE

## 2021-10-08 RX ORDER — LEVOTHYROXINE SODIUM 0.2 MG/1
200 TABLET ORAL DAILY
Qty: 90 TABLET | Refills: 1 | Status: SHIPPED | OUTPATIENT
Start: 2021-10-08 | End: 2022-03-30

## 2021-10-08 RX ORDER — GALCANEZUMAB 120 MG/ML
INJECTION, SOLUTION SUBCUTANEOUS
COMMUNITY
Start: 2021-10-01

## 2021-10-08 RX ORDER — BACLOFEN 10 MG/1
TABLET ORAL
COMMUNITY
Start: 2021-09-19

## 2021-10-08 RX ORDER — CLONAZEPAM 1 MG/1
1 TABLET ORAL 2 TIMES DAILY PRN
Qty: 60 TABLET | Refills: 0 | Status: SHIPPED | OUTPATIENT
Start: 2021-10-08 | End: 2022-03-16

## 2021-10-08 RX ORDER — BUTALBITAL, ACETAMINOPHEN AND CAFFEINE 50; 325; 40 MG/1; MG/1; MG/1
1 TABLET ORAL EVERY 6 HOURS PRN
Qty: 30 TABLET | Refills: 1 | Status: SHIPPED | OUTPATIENT
Start: 2021-10-08

## 2021-10-08 ASSESSMENT — ENCOUNTER SYMPTOMS
CONSTIPATION: 1
WHEEZING: 0
ABDOMINAL PAIN: 0
NAUSEA: 0
EYE REDNESS: 0
VOMITING: 0
CHEST TIGHTNESS: 0
EYE ITCHING: 0
RHINORRHEA: 0
TROUBLE SWALLOWING: 0
SHORTNESS OF BREATH: 0

## 2021-10-08 NOTE — PROGRESS NOTES
Subjective:      Patient ID: Karen Wallace is a 49 y.o. female. HPI  Well Adult Physical   Patient here for a comprehensive physical exam.The patient reports problems -   Follow up  1. Hypothyroidism:  Now on replacement therapy, positive for: fatigue,  slow thought process, tremor, hair loss, diaphoresis,  Unable to loss weight  No : diarrhea/constipation. 2. Discuss new labs  Hyperglycemia,   Hyperlipidemia    3. Chronic ha   Sx stable on current tx : fioricet, needs refills  His neurologist added baclofen and that seems to be helping too    4. Panic attacks  Intermittent   tx : klonopin with relief of acute attacks, used it rarely   No sec effects. Do you take any herbs or supplements that were not prescribed by a doctor? not asked Are you taking calcium supplements? not applicable Are you taking aspirin daily? not applicable   History:  Pap with gyn utd   Mammogram utd  Declines: covid-19 vaccine and flu vaccine  Considering getting shingrix       Review of Systems   Constitutional: Positive for appetite change and fatigue. Negative for activity change, fever and unexpected weight change. HENT: Negative for congestion, postnasal drip, rhinorrhea and trouble swallowing. Eyes: Negative for redness, itching and visual disturbance. Respiratory: Negative for chest tightness, shortness of breath and wheezing. Cardiovascular: Negative for chest pain and palpitations. Gastrointestinal: Positive for constipation. Negative for abdominal pain, nausea and vomiting. Endocrine: Negative for cold intolerance, heat intolerance, polydipsia, polyphagia and polyuria. Genitourinary: Negative for dysuria and urgency. Musculoskeletal: Negative for arthralgias, joint swelling, myalgias and neck pain. Skin: Negative for rash. Neurological: Negative for dizziness, light-headedness and headaches. Hematological: Negative for adenopathy. Psychiatric/Behavioral: Positive for dysphoric mood.  The patient is not nervous/anxious. is allergic to heparin. Current Outpatient Medications:     baclofen (LIORESAL) 10 MG tablet, TAKE 1 TABLET (10 MG TOTAL) BY MOUTH 3 TIMES A DAY. 1 BY MOUTH TWICE A DAY AS NEEDED HEADACHE, Disp: , Rfl:     EMGALITY 120 MG/ML SOAJ, , Disp: , Rfl:     clonazePAM (KLONOPIN) 1 MG tablet, Take 1 tablet by mouth 2 times daily as needed for Anxiety for up to 30 days. , Disp: 60 tablet, Rfl: 0    butalbital-acetaminophen-caffeine (FIORICET, ESGIC) -40 MG per tablet, Take 1 tablet by mouth every 6 hours as needed for Headaches, Disp: 30 tablet, Rfl: 1    levothyroxine (SYNTHROID) 200 MCG tablet, Take 1 tablet by mouth daily, Disp: 90 tablet, Rfl: 1    SUMAtriptan (IMITREX) 100 MG tablet, TAKE ONE TABLET BY MOUTH AT ONSET OF HEADACHE. MAY REPEAT IN 2 HOURS MAX OF 2 TABS PER 24 HOURS, Disp: 9 tablet, Rfl: 6    amitriptyline (ELAVIL) 25 MG tablet, TAKE 3 TABLETS BY MOUTH AT BEDTIME, Disp: 90 tablet, Rfl: 1    buPROPion (WELLBUTRIN XL) 300 MG extended release tablet, TAKE 1 TABLET BY MOUTH EVERY DAY IN THE MORNING, Disp: 30 tablet, Rfl: 8    escitalopram (LEXAPRO) 20 MG tablet, TAKE 1 TABLET BY MOUTH EVERY DAY, Disp: 90 tablet, Rfl: 1    propranolol (INDERAL) 10 MG tablet, Take 1 tablet by mouth 2 times daily, Disp: 90 tablet, Rfl: 1    omeprazole (PRILOSEC) 20 MG delayed release capsule, TAKE 1 CAPSULE BY MOUTH EVERY DAY, Disp: 30 capsule, Rfl: 5    primidone (MYSOLINE) 50 MG tablet, Take 100 mg by mouth nightly., Disp: , Rfl:      has a past medical history of Anxiety, Breast cancer (HCC), Cervical disc disorder at C6-C7 level with myelopathy, Cervical spondylarthritis, Class 1 obesity due to excess calories without serious comorbidity with body mass index (BMI) of 30.0 to 30.9 in adult, Depression, Hypothyroidism, Migraine, Neuropathy, TMJ (temporomandibular joint syndrome), TMJ disease, and Urinary incontinence.     Past Surgical History:   Procedure Laterality Date  BREAST LUMPECTOMY      right at  46 yo     KNEE SURGERY Right 2013    SPINAL FUSION  2013    TONSILLECTOMY          reports that she has never smoked. She has never used smokeless tobacco. She reports current alcohol use.    family history includes Cancer in her mother; High Blood Pressure in her brother; Mental Illness in her mother; Other in her brother and mother. Objective:  Blood pressure 138/86, pulse 83, temperature 97.3 °F (36.3 °C), temperature source Infrared, height 5' 5\" (1.651 m), weight 189 lb (85.7 kg), SpO2 99 %, not currently breastfeeding. Physical Exam  Vitals and nursing note reviewed. Constitutional:       General: She is not in acute distress. Appearance: Normal appearance. She is well-developed. She is obese. She is not ill-appearing, toxic-appearing or diaphoretic. HENT:      Head: Normocephalic and atraumatic. Right Ear: Tympanic membrane, ear canal and external ear normal. There is no impacted cerumen. Left Ear: Tympanic membrane, ear canal and external ear normal. There is no impacted cerumen. Eyes:      General: No scleral icterus. Right eye: No discharge. Left eye: No discharge. Extraocular Movements: Extraocular movements intact. Conjunctiva/sclera: Conjunctivae normal.      Pupils: Pupils are equal, round, and reactive to light. Neck:      Thyroid: No thyromegaly. Vascular: No carotid bruit or JVD. Trachea: No tracheal deviation. Cardiovascular:      Rate and Rhythm: Normal rate and regular rhythm. Heart sounds: Normal heart sounds. No murmur heard. No friction rub. No gallop. Pulmonary:      Effort: Pulmonary effort is normal. No respiratory distress. Breath sounds: Normal breath sounds. No stridor. No wheezing, rhonchi or rales. Chest:      Chest wall: No tenderness. Abdominal:      General: Abdomen is flat. Bowel sounds are normal. There is no distension. Palpations: Abdomen is soft. There is no mass. Tenderness: There is no abdominal tenderness. There is no guarding or rebound. Musculoskeletal:         General: No swelling, tenderness, deformity or signs of injury. Normal range of motion. Cervical back: Normal range of motion and neck supple. No muscular tenderness. Left lower leg: No edema. Lymphadenopathy:      Cervical: No cervical adenopathy. Skin:     General: Skin is warm. Capillary Refill: Capillary refill takes less than 2 seconds. Coloration: Skin is not jaundiced or pale. Findings: No erythema or rash. Neurological:      General: No focal deficit present. Mental Status: She is alert and oriented to person, place, and time. Mental status is at baseline. Cranial Nerves: No cranial nerve deficit. Sensory: No sensory deficit. Motor: No weakness or abnormal muscle tone. Coordination: Coordination normal.      Gait: Gait normal.      Deep Tendon Reflexes: Reflexes are normal and symmetric. Psychiatric:         Mood and Affect: Mood normal.         Behavior: Behavior normal.         Thought Content: Thought content normal.         Judgment: Judgment normal.         Assessment:       Diagnosis Orders   1. Well adult exam     2. Panic attacks  clonazePAM (KLONOPIN) 1 MG tablet   3. Nonintractable headache, unspecified chronicity pattern, unspecified headache type  butalbital-acetaminophen-caffeine (FIORICET, ESGIC) -40 MG per tablet   4. Hypothyroidism, unspecified type  levothyroxine (SYNTHROID) 200 MCG tablet           Plan:      Well adult:    . Doing well, encourage healthy diet, exercise, safety    . Screening labs orders given   . Preventive: declines vaccines,    2. Stable  Controlled Substances Monitoring: Attestation: The Prescription Monitoring Report for this patient was reviewed today. Ena Ku MD)  Documentation: No signs of potential drug abuse or diversion identified.  Ena Ku MD)  Refilled  Fu 3 months if refills needed. 3. Stable  Refills    4. Deteriorated, high tsh   Adjust synthroid to 200 mcg   Re check tsh in 6 weeks, ok lv    5. Hyperlipidemia  New dx  encourage wt loss, low fat diet  Repeat in 6 mo.                    Luis Angel Yost MD

## 2021-10-20 ENCOUNTER — OFFICE VISIT (OUTPATIENT)
Dept: SURGERY | Age: 56
End: 2021-10-20
Payer: COMMERCIAL

## 2021-10-20 VITALS
OXYGEN SATURATION: 98 % | TEMPERATURE: 98 F | DIASTOLIC BLOOD PRESSURE: 101 MMHG | HEIGHT: 65 IN | WEIGHT: 189 LBS | RESPIRATION RATE: 16 BRPM | BODY MASS INDEX: 31.49 KG/M2 | SYSTOLIC BLOOD PRESSURE: 160 MMHG | HEART RATE: 81 BPM

## 2021-10-20 DIAGNOSIS — C50.911 MALIGNANT NEOPLASM OF RIGHT FEMALE BREAST, UNSPECIFIED ESTROGEN RECEPTOR STATUS, UNSPECIFIED SITE OF BREAST (HCC): Primary | ICD-10-CM

## 2021-10-20 PROCEDURE — 99205 OFFICE O/P NEW HI 60 MIN: CPT | Performed by: SURGERY

## 2021-10-20 NOTE — PROGRESS NOTES
[facility]    CC: Breast CA     Referring physician: Previous patient    HPI: This is a 49 y.o. female with a PMHx as delineated below who presents in consultation for breast reconstruction. She was found to have right breast cancer and previously underwent right lumpectomy with adjuvant radiation (2005). She has a significant issue with asymmetry after the radiation and lumpectomy, thus she presents for evaluation and treatment. Additionally, she desires nipple reconstruction. The specifics of her breast cancer workup / treatment is as follows:     Diagnosis: invasive ductal  Mo/Yr Diagnosed: 2005  Breast Involved:Right  Surgery: Lumpectomy with sentinel lymph node biopsy  Date of Surgery: 2005  Tumor Size & Grade: T2N1M0  Stage: 3B  Nadege status: Positive  ER: Negative MI: Negative HER2: Positive  Radiation: COMPLETED RADIATION 2005    Chemo/Meds: Completed adjuvant chemotherapy  Pregnancy/Miscarriages: 6 / 2    PMHx:   Past Medical History:   Diagnosis Date    Anxiety     Breast cancer ([address]     at 47    Cervical disc disorder at C6-C7 level with myelopathy 2012    Cervical spondylarthritis     Class 1 obesity due to excess calories without serious comorbidity with body mass index (BMI) of 30.0 to 30.9 in adult 08/06/2018    COVID-19 01/2021    Depression     Hyperlipidemia 10/08/2021    Hypothyroidism     Migraine     hx depakote, nadalol, topamax (stomach upset)    Neuropathy     TMJ (temporomandibular joint syndrome)     TMJ disease     Urinary incontinence 08/24/2016   COVID (not vaccinated)    PSHx:   Past Surgical History:   Procedure Laterality Date    BREAST LUMPECTOMY      right at  48 yo     KNEE SURGERY Right 2013    SPINAL FUSION  2013    TONSILLECTOMY       Allergies:    Allergies   Allergen Reactions    Heparin      Hives      FHx: Past history of breast CA: Yes (mother, paternal aunt)    Meds:   Current Outpatient Medications   Medication Sig Dispense for color change, pallor and rash. Endocrine: negative for tremors, temperature intolerance or polydipsia. Allergic/Immunologic: Negative for new environmental or food allergies. Neurological: Negative for dizziness, seizures, speech difficulty, numbness. Hematological: Negative for adenopathy. Psychiatric/Behavioral: Negative for agitation and confusion. EXAM  BP (!) 160/101   Pulse 81   Temp 98 °F (36.7 °C)   Resp 16   Ht 5' 5\" (1.651 m)   Wt 189 lb (85.7 kg)   SpO2 98%   BMI 31.45 kg/m²     GEN: NAD, pleasant, obese  CVS: RRR  PULM: No respiratory distress  HEENT: PERRLA/EOMI; hearing appears within normal limits  NECK: Supple with trachea in midline, no masses  EXT: No lymphedema noted  ABD: soft/NT/obese  Excess skin on the inferior aspect  NEURO: No focal deficits, no obvious CN deficits  BACK: Bilateral latiss muscle intact  BREAST:   Physical Exam    Bra size: 38DDD  Desired bra size: Better symmetry; smaller  Ptosis grade:   R: 3   L: 3  The left breast size is larger than the right breast.  There were no palpable masses with no palpable lymphadenopathy in the ipsilateral right axilla. Nipple retraction: No on R  Radiation changes noted on the right with superomedial transverse lumpectomy on the right that is healed with significant contour deficiency  Healed port on the L    Left breast sternal notch to nipple: 31.5 cm  Right breast sternal notch to nipple: 25.5 cm    Left breast nipple to inframammary fold: 10 cm  Right breast nipple to inframammary fold: 9 cm    Left breast width 16 cm  Right breast width 14.5 cm    IMAGING: PENDING    IMP: 49 y.o. female with breast cancer who presents for discussion regarding revision reconstruction options  PLAN: Discussed multiple options given her previous surgery and radiation changes.   Would benefit from left reduction mammaplasty to improve her symmetry with her radiated right breast.  Will additionally need fat grafting with likely subcision for improvement in the contour on her right side. She will need multiple rounds of fat grafting. Additionally, in the future, may benefit from abdominoplasty. Will schedule. A discussion regarding surgical options including immediate vs delayed approaches, implant based vs autologous, as well as additional planned revisional surgeries was performed with the patient and family. Multiple autologous donor sites were discussed as well. Clinical photos were obtained. We additionally discussed the FDA recommendations regarding monitoring of silicone implants. The risks, benefits, alternatives, outcomes, personnel involved were discussed. Specifically, the risks including, but not limited to: bleeding that may necessitate transfusion or operation, infection, seroma, reoperation, nonhealing, poor cosmetic outcome, asymmetry, scarring, partial or total flap loss, donor site morbidity, VTE (DVT/PE), and death were discussed. All questions were answered in a satisfactory manner according to the patient. The patient was counseled at length about the risks of cristhian Covid-19 during their perioperative period and any recovery window from their procedure. The patient was made aware that cristhian Covid-19  may worsen their prognosis for recovering from their procedure  and lend to a higher morbidity and/or mortality risk. All material risks, benefits, and reasonable alternatives including postponing the procedure were discussed. The patient does wish to proceed with the procedure at this time. Total encounter time: 60 min with > 50% spent with face to face (or vitual) counseling and coordination of care.     Alisha Lan MD  [facility]  10/20/21

## 2021-10-20 NOTE — LETTER
Cefazolin (Ancef) 2 gm IV OCTOR   ** NOTE:  If patient weight is > 120 kg, Administer 3 gm         2. ** If PCN allergy, then Clindamycin 600mg IV OCTOR.   ** If prior history of MRSA, Vancomycin 1g IV OCTOR (please check with renal function prior to administration)       3.  Other orders: None Listed      Physician / Surgeon:  Robinson Waldrop MD  Office Ph:  [phone]       Physician Signature:     9/07

## 2021-10-21 ENCOUNTER — TELEPHONE (OUTPATIENT)
Dept: SURGERY | Age: 56
End: 2021-10-21

## 2021-10-21 NOTE — TELEPHONE ENCOUNTER
The patient was in the office to see Dr. John Woodward yesterday. PLAN: Discussed multiple options given her previous surgery and radiation changes. Would benefit from left reduction mammaplasty to improve her symmetry with her radiated right breast.  Will additionally need fat grafting with likely subcision for improvement in the contour on her right side. She will need multiple rounds of fat grafting. Additionally, in the future, may benefit from abdominoplasty. Will schedule. I spoke with DJ at [facility] ([phone]) to see if CPT Code , 74440 or 13202 require pre certification. CPT Codes 88518 and 29311 do not require pre certification or review. CPT Code 18428 does require pre certification, which I started during our call. I will fax clinicals and pictures to [phone]. I will scan the information that I faxed and the fax success into Epic under the media tab, but I am not able to scan colored pictures. Date Range 10- to 12-? Pending Case # [id number]    I spoke with the patient at the home number listed to provide an insurance update. I will leave this phone note open.

## 2021-11-02 NOTE — TELEPHONE ENCOUNTER
The patient returned my call mentioned below. The patient is now scheduled for surgery with  on 12-. The patient is aware of H&P. The patient is aware that she will need to be COVID tested on 12-. The patient is aware that we will not accept a RAPID COVID test and that the results will need to be faxed to us. The patient is scheduled for her post op appointment 1-5-2022. I will submit the surgery letter. I will mail the surgery information and instructions to the patient today. I will close this phone note.

## 2021-11-02 NOTE — TELEPHONE ENCOUNTER
I spoke with Abdulaziz at [facility] ([phone]) to follow up on Pending Case # [id number]. I received a fax from [facility] dated 11-1-2021. The fax is requesting additional information. I asked Abdulaziz to look into the request, since the patients diagnosis is C50.911. Abdulaziz reviewed the case and clinicals during our call. APPROVED  Reference Number [id number]  CPT Code 69548  Date Range 10- to 10-    I lmom for patient at the home number listed. I requested a call back to discuss insurance and scheduling surgery. I will leave this phone note open.                .

## 2021-11-18 RX ORDER — OMEPRAZOLE 20 MG/1
CAPSULE, DELAYED RELEASE ORAL
Qty: 30 CAPSULE | Refills: 5 | Status: SHIPPED | OUTPATIENT
Start: 2021-11-18 | End: 2022-05-09

## 2021-11-18 NOTE — TELEPHONE ENCOUNTER
Medication:   Requested Prescriptions     Pending Prescriptions Disp Refills    omeprazole (PRILOSEC) 20 MG delayed release capsule 30 capsule 5            Patient Phone Number: [phone] (home)     Last appt: 10/08/21

## 2021-11-19 DIAGNOSIS — E03.9 HYPOTHYROIDISM, UNSPECIFIED TYPE: ICD-10-CM

## 2021-11-19 LAB — TSH SERPL DL<=0.05 MIU/L-ACNC: 0.45 UIU/ML (ref 0.27–4.2)

## 2021-11-19 PROCEDURE — 36415 COLL VENOUS BLD VENIPUNCTURE: CPT | Performed by: FAMILY MEDICINE

## 2021-12-22 ENCOUNTER — OFFICE VISIT (OUTPATIENT)
Dept: FAMILY MEDICINE CLINIC | Age: 56
End: 2021-12-22
Payer: COMMERCIAL

## 2021-12-22 ENCOUNTER — HOSPITAL ENCOUNTER (OUTPATIENT)
Age: 56
Discharge: HOME | End: 2021-12-22
Payer: COMMERCIAL

## 2021-12-22 VITALS
TEMPERATURE: 98.8 F | OXYGEN SATURATION: 95 % | BODY MASS INDEX: 31.94 KG/M2 | WEIGHT: 191.7 LBS | DIASTOLIC BLOOD PRESSURE: 92 MMHG | HEART RATE: 98 BPM | SYSTOLIC BLOOD PRESSURE: 144 MMHG | HEIGHT: 65 IN | RESPIRATION RATE: 16 BRPM

## 2021-12-22 DIAGNOSIS — R94.31 ABNORMAL EKG: Primary | ICD-10-CM

## 2021-12-22 DIAGNOSIS — Z01.818 PREOP EXAMINATION: Primary | ICD-10-CM

## 2021-12-22 DIAGNOSIS — Z01.818 PREOP EXAMINATION: ICD-10-CM

## 2021-12-22 DIAGNOSIS — E03.9 HYPOTHYROIDISM, UNSPECIFIED TYPE: ICD-10-CM

## 2021-12-22 DIAGNOSIS — I10 REACTIVE HYPERTENSION: ICD-10-CM

## 2021-12-22 DIAGNOSIS — N64.89 BREAST ASYMMETRY: ICD-10-CM

## 2021-12-22 LAB
A/G RATIO: 1.6 (ref 1.1–2.2)
ALBUMIN SERPL-MCNC: 4.4 G/DL (ref 3.4–5)
ALP BLD-CCNC: 82 U/L (ref 40–129)
ALT SERPL-CCNC: 20 U/L (ref 10–40)
ANION GAP SERPL CALCULATED.3IONS-SCNC: 12 MMOL/L (ref 3–16)
APTT: 35.9 SEC (ref 26.2–38.6)
AST SERPL-CCNC: 18 U/L (ref 15–37)
BASOPHILS ABSOLUTE: 0.1 K/UL (ref 0–0.2)
BASOPHILS RELATIVE PERCENT: 0.9 %
BILIRUB SERPL-MCNC: <0.2 MG/DL (ref 0–1)
BUN BLDV-MCNC: 18 MG/DL (ref 7–20)
CALCIUM SERPL-MCNC: 8.9 MG/DL (ref 8.3–10.6)
CHLORIDE BLD-SCNC: 104 MMOL/L (ref 99–110)
CO2: 27 MMOL/L (ref 21–32)
CREAT SERPL-MCNC: 0.9 MG/DL (ref 0.6–1.1)
EKG ATRIAL RATE: 81 BPM
EKG DIAGNOSIS: NORMAL
EKG P AXIS: 38 DEGREES
EKG P-R INTERVAL: 152 MS
EKG Q-T INTERVAL: 378 MS
EKG QRS DURATION: 92 MS
EKG QTC CALCULATION (BAZETT): 439 MS
EKG R AXIS: 22 DEGREES
EKG T AXIS: 21 DEGREES
EKG VENTRICULAR RATE: 81 BPM
EOSINOPHILS ABSOLUTE: 0.3 K/UL (ref 0–0.6)
EOSINOPHILS RELATIVE PERCENT: 4.3 %
GFR AFRICAN AMERICAN: >60
GFR NON-AFRICAN AMERICAN: >60
GLUCOSE BLD-MCNC: 110 MG/DL (ref 70–99)
HCT VFR BLD CALC: 44.3 % (ref 36–48)
HEMOGLOBIN: 14.4 G/DL (ref 12–16)
INR BLD: 0.93 (ref 0.88–1.12)
LYMPHOCYTES ABSOLUTE: 2.2 K/UL (ref 1–5.1)
LYMPHOCYTES RELATIVE PERCENT: 35.9 %
MCH RBC QN AUTO: 28.7 PG (ref 26–34)
MCHC RBC AUTO-ENTMCNC: 32.5 G/DL (ref 31–36)
MCV RBC AUTO: 88.1 FL (ref 80–100)
MONOCYTES ABSOLUTE: 0.5 K/UL (ref 0–1.3)
MONOCYTES RELATIVE PERCENT: 8.9 %
NEUTROPHILS ABSOLUTE: 3 K/UL (ref 1.7–7.7)
NEUTROPHILS RELATIVE PERCENT: 50 %
PDW BLD-RTO: 13.6 % (ref 12.4–15.4)
PLATELET # BLD: 252 K/UL (ref 135–450)
PMV BLD AUTO: 8.2 FL (ref 5–10.5)
POTASSIUM SERPL-SCNC: 4.8 MMOL/L (ref 3.5–5.1)
PROTHROMBIN TIME: 10.5 SEC (ref 9.9–12.7)
RBC # BLD: 5.03 M/UL (ref 4–5.2)
SODIUM BLD-SCNC: 143 MMOL/L (ref 136–145)
TOTAL PROTEIN: 7.2 G/DL (ref 6.4–8.2)
WBC # BLD: 6 K/UL (ref 4–11)

## 2021-12-22 PROCEDURE — 99214 OFFICE O/P EST MOD 30 MIN: CPT | Performed by: FAMILY MEDICINE

## 2021-12-22 PROCEDURE — 93005 ELECTROCARDIOGRAM TRACING: CPT | Performed by: FAMILY MEDICINE

## 2021-12-22 PROCEDURE — 93010 ELECTROCARDIOGRAM REPORT: CPT | Performed by: INTERNAL MEDICINE

## 2021-12-22 RX ORDER — ATENOLOL 25 MG/1
25 TABLET ORAL DAILY
Qty: 30 TABLET | Refills: 3 | Status: SHIPPED | OUTPATIENT
Start: 2021-12-22 | End: 2022-01-14

## 2021-12-22 SDOH — ECONOMIC STABILITY: FOOD INSECURITY: WITHIN THE PAST 12 MONTHS, THE FOOD YOU BOUGHT JUST DIDN'T LAST AND YOU DIDN'T HAVE MONEY TO GET MORE.: NEVER TRUE

## 2021-12-22 SDOH — ECONOMIC STABILITY: FOOD INSECURITY: WITHIN THE PAST 12 MONTHS, YOU WORRIED THAT YOUR FOOD WOULD RUN OUT BEFORE YOU GOT MONEY TO BUY MORE.: NEVER TRUE

## 2021-12-22 ASSESSMENT — SOCIAL DETERMINANTS OF HEALTH (SDOH): HOW HARD IS IT FOR YOU TO PAY FOR THE VERY BASICS LIKE FOOD, HOUSING, MEDICAL CARE, AND HEATING?: NOT HARD AT ALL

## 2021-12-22 NOTE — PROGRESS NOTES
Preoperative Consultation      Prasanna Kapadia  YOB: 1965    Date of Service:  12/22/2021    Vitals:    12/22/21 0725 12/22/21 0726   BP: (!) 146/94 (!) 144/92   Pulse: 98    Resp: 16    Temp: 98.8 °F (37.1 °C)    SpO2: 95%    Weight: 191 lb 11.2 oz (87 kg)    Height: 5' 5\" (1.651 m)       Wt Readings from Last 2 Encounters:   12/22/21 191 lb 11.2 oz (87 kg)   10/20/21 189 lb (85.7 kg)     BP Readings from Last 3 Encounters:   12/22/21 (!) 144/92   10/20/21 (!) 160/101   10/08/21 138/86        Chief Complaint   Patient presents with   Dimple Granados Pre-op Exam     L breast reduction and R breast fat graft on 12/30 with Dr. Lamar Mccauley at  [facility]   Allergen Reactions    Heparin      Hives      Outpatient Medications Marked as Taking for the 12/22/21 encounter (Office Visit) with Clara Reece MD   Medication Sig Dispense Refill    omeprazole (PRILOSEC) 20 MG delayed release capsule One po qd 30 capsule 5    baclofen (LIORESAL) 10 MG tablet TAKE 1 TABLET (10 MG TOTAL) BY MOUTH 3 TIMES A DAY. 1 BY MOUTH TWICE A DAY AS NEEDED HEADACHE      EMGALITY 120 MG/ML SOAJ       clonazePAM (KLONOPIN) 1 MG tablet Take 1 tablet by mouth 2 times daily as needed for Anxiety for up to 30 days. 60 tablet 0    butalbital-acetaminophen-caffeine (FIORICET, ESGIC) -40 MG per tablet Take 1 tablet by mouth every 6 hours as needed for Headaches 30 tablet 1    levothyroxine (SYNTHROID) 200 MCG tablet Take 1 tablet by mouth daily 90 tablet 1    SUMAtriptan (IMITREX) 100 MG tablet TAKE ONE TABLET BY MOUTH AT ONSET OF HEADACHE.  MAY REPEAT IN 2 HOURS MAX OF 2 TABS PER 24 HOURS 9 tablet 6    amitriptyline (ELAVIL) 25 MG tablet TAKE 3 TABLETS BY MOUTH AT BEDTIME 90 tablet 1    buPROPion (WELLBUTRIN XL) 300 MG extended release tablet TAKE 1 TABLET BY MOUTH EVERY DAY IN THE MORNING 30 tablet 8    escitalopram (LEXAPRO) 20 MG tablet TAKE 1 TABLET BY MOUTH EVERY DAY 90 tablet 1    propranolol (INDERAL) 10 MG tablet Take 1 tablet by mouth 2 times daily 90 tablet 1    primidone (MYSOLINE) 50 MG tablet Take 100 mg by mouth nightly. This patient presents to the office today for a preoperative consultation at the request of surgeon, Dr. Bill Bobby, who plans on performing L breast reduction and R breast fat graft  on December 30 at [place] [address].  The current problem began 16 year ago and symptoms have been NA  with time. Conservative therapy: N/A.     Planned anesthesia: General   Known anesthesia problems: None   Bleeding risk: No recent or remote history of abnormal bleeding  Personal or FH of DVT/PE: No    Patient objection to receiving blood products: No                                                                          Past Medical History:   Diagnosis Date    Anxiety     Breast cancer ([facility] [address]     at 47    Cervical disc disorder at C6-C7 level with myelopathy 2012    Cervical spondylarthritis     Class 1 obesity due to excess calories without serious comorbidity with body mass index (BMI) of 30.0 to 30.9 in adult 08/06/2018    COVID-19 01/2021    Depression     Hyperlipidemia 10/08/2021    Hypothyroidism     Migraine     hx depakote, nadalol, topamax (stomach upset)    Neuropathy     TMJ (temporomandibular joint syndrome)     TMJ disease     Urinary incontinence 08/24/2016     Past Surgical History:   Procedure Laterality Date    BREAST LUMPECTOMY      right at  48 yo     KNEE SURGERY Right 2013    SPINAL FUSION  2013    TONSILLECTOMY       Family History   Problem Relation Age of Onset    Other Brother         hyperthyroidism    High Blood Pressure Brother     Cancer Mother         breast    Mental Illness Mother     Other Mother         hypothyroidism     Social History     Socioeconomic History    Marital status:      Spouse name: [address] [place] [address] Number of children: 3    Years of education: Not on file    Highest education level: Not on file   Occupational History    Occupation: part time warehouse   Tobacco Use    Smoking status: Never Smoker    Smokeless tobacco: Never Used   Substance and Sexual Activity    Alcohol use: Yes    Drug use: Not on file    Sexual activity: Yes     Partners: Male                                                                                                           Review of Systems  A comprehensive review of systems was negative except for what was noted in the HPI. Physical Exam   Blood pressure (!) 144/92, pulse 98, temperature 98.8 °F (37.1 °C), resp. rate 16, height 5' 5\" (1.651 m), weight 191 lb 11.2 oz (87 kg), SpO2 95 %, not currently breastfeeding. Constitutional: She is oriented to person, place, and time. She appears well-developed and well-nourished. No distress. HENT:   Head: Normocephalic and atraumatic. Mouth/Throat: Uvula is midline, oropharynx is clear and moist and mucous membranes are normal.   Eyes: Conjunctivae and EOM are normal. Pupils are equal, round, and reactive to light. Neck: Trachea normal and normal range of motion. Neck supple. No JVD present. Carotid bruit is not present. No mass and no thyromegaly present. Cardiovascular: Normal rate, regular rhythm, normal heart sounds and intact distal pulses. Exam reveals no gallop and no friction rub. No murmur heard. Pulmonary/Chest: Effort normal and breath sounds normal. No respiratory distress. She has no wheezes. She has no rales. Abdominal: Soft. Normal aorta and bowel sounds are normal. She exhibits no distension and no mass. There is no hepatosplenomegaly. No tenderness. Musculoskeletal: She exhibits no edema and no tenderness. Neurological: She is alert and oriented to person, place, and time. She has normal strength. No cranial nerve deficit or sensory deficit. Coordination and gait normal.   Skin: Skin is warm and dry. No rash noted. No erythema. Psychiatric: She has a normal mood and affect.  Her behavior is normal.     EKG Interpretation: Pending  . Lab Review   Results for Joe Hernandez (MRN [id number]) as of 12/22/2021 07:34   Ref. Range 10/4/2021 09:34   Sodium Latest Ref Range: 136 - 145 mmol/L 144   Potassium Latest Ref Range: 3.5 - 5.1 mmol/L 4.8   Chloride Latest Ref Range: 99 - 110 mmol/L 104   CO2 Latest Ref Range: 21 - 32 mmol/L 26   BUN Latest Ref Range: 7 - 20 mg/dL 19   Creatinine Latest Ref Range: 0.6 - 1.1 mg/dL 1.0   Anion Gap Latest Ref Range: 3 - 16  14   GFR Non- Latest Ref Range: >60  58 (A)   GFR  Latest Ref Range: >60  >60   Glucose Latest Ref Range: 70 - 99 mg/dL 111 (H)   Calcium Latest Ref Range: 8.3 - 10.6 mg/dL 9.7   Total Protein Latest Ref Range: 6.4 - 8.2 g/dL 7.5     Results for Joe Hernandez (MRN [id number]) as of 12/22/2021 07:34   Ref. Range 11/19/2021 10:49   aTSH Latest Ref Range: 0.27 - 4.20 uIU/mL 0.45      Assessment:       49 y.o. patient with planned surgery as above. Known risk factors for perioperative complications: reactive HTN   Current medications which may produce withdrawal symptoms if withheld perioperatively: none      Plan:     1. Preoperative workup as follows: ECG  2. Change in medication regimen before surgery: Take synthroid and atenolol   on morning of surgery with sip of water, and hold all other medications until after surgery  3.  Prophylaxis for cardiac events with perioperative beta-blockers: Currently taking  atenolol  ACC/AHA indications for pre-operative beta-blocker use:    · Vascular surgery with history of postitive stress test  · Intermediate or high risk surgery with history of CAD   · Intermediate or high risk surgery with multiple clinical predictors of CAD- 2 of the following: history of compensated or prior heart failure, history of cerebrovascular disease, DM, or renal insufficiency    Routine administration of higher-dose, long-acting metoprolol in beta-blocker-naïve patients on the day of surgery, and in the absence of dose titration is associated with an overall increase in mortality. Beta-blockers should be started days to weeks prior to surgery and titrated to pulse < 70.  4. Deep vein thrombosis prophylaxis: regimen to be chosen by surgical team  5. No contraindications to planned surgery if EKG is normal     Reactive HTN:   rx for atenolol   Fu 1 mo. Addendum: Dec 22, 2021 9 36 am     EKG: normal EKG, normal sinus rhythm, Q wave in inferior lead, abnormal ekg   Needs gxt , ordered this am .     .

## 2021-12-28 ENCOUNTER — HOSPITAL ENCOUNTER (OUTPATIENT)
Dept: NON INVASIVE DIAGNOSTICS | Age: 56
Discharge: HOME | End: 2021-12-28
Payer: COMMERCIAL

## 2021-12-28 LAB
LV EF: 73 %
LVEF MODALITY: NORMAL

## 2021-12-28 PROCEDURE — 3430000000 HC RX DIAGNOSTIC RADIOPHARMACEUTICAL: Performed by: FAMILY MEDICINE

## 2021-12-28 PROCEDURE — A9502 TC99M TETROFOSMIN: HCPCS | Performed by: FAMILY MEDICINE

## 2021-12-28 PROCEDURE — 93017 CV STRESS TEST TRACING ONLY: CPT | Performed by: INTERNAL MEDICINE

## 2021-12-28 PROCEDURE — 78452 HT MUSCLE IMAGE SPECT MULT: CPT | Performed by: INTERNAL MEDICINE

## 2021-12-28 RX ADMIN — TETROFOSMIN 10 MILLICURIE: 1.38 INJECTION, POWDER, LYOPHILIZED, FOR SOLUTION INTRAVENOUS at 08:05

## 2021-12-28 RX ADMIN — TETROFOSMIN 30 MILLICURIE: 1.38 INJECTION, POWDER, LYOPHILIZED, FOR SOLUTION INTRAVENOUS at 09:16

## 2021-12-28 NOTE — PROGRESS NOTES
Patient instructed on [facility] Protocol Stress Test Procedure including possible side effects and adverse reactions. Verbalizes knowledge and understanding and denies having any questions.

## 2021-12-29 ENCOUNTER — ANESTHESIA EVENT (OUTPATIENT)
Dept: OPERATING ROOM | Age: 56
End: 2021-12-29
Payer: COMMERCIAL

## 2021-12-29 NOTE — PROGRESS NOTES
PRE-OP INSTRUCTIONS FOR THE SURGICAL PATIENT YOU ARE UNABLE TO MAKE CONTACT FOR AN INTERVIEW:    All patients having surgery or anesthesia are required to be Covid tested OR to have been vaccinated at least 14 days prior to your procedure. It is very important to return our call to [phone] and notify the staff of your last vaccination date otherwise you will be required to complete Covid PCR test within the 5-6 days prior to surgery & quarantine. The results will need to be faxed to PreAdmission Testing at [phone]. 1. Follow all instructions provided to you from your surgeons office, including your ARRIVAL TIME. 2. Enter the MAIN entrance located on [address] and report to the desk. 3. Bring your insurance & photo ID with you. You may also be asked to pay a co-pay, as you may want to bring a check or credit card with you. 4. Leave all other valuables at home. 5. Arrange for someone to drive you home and be with you for the first 24 hours after discharge. 6. Bring your medication list with you day of surgery with doses and frequency listed (including over the counter medications)  7. You must contact your surgeon for Instructions regarding:              - ALL medication instructions, especially if taking blood thinners, aspirin, or diabetic medication.         -Bariatric patients call surgeon if on diabetic medications as some need to be stopped 1 week preop  - IF  there is a change in your physical condition such as a cold, fever, rash, cuts, sores or any other infection, especially near your surgical site. 8. A Pre-op History and Physical for surgery MUST be completed by your Physician or an Urgent Care within 30 days of your procedure date. Please bring a copy with you on the day of your procedure and along with any other testing performed. 9. DO NOT EAT ANYTHING eight hours prior to arrival for surgery.   May have up to 8 ounces of water 4 hours prior to arrival for surgery. NOTE: ALL Gastric, Bariatric and Bowel surgery patients MUST follow their surgeon's instructions. 10. No gum, candy, mints, or ice chips day of procedure. 11. Please refrain from drinking alcohol the day before or day of your procedure. 12. Please do not smoke the day of your procedure. 13. Dress in loose, comfortable clothing appropriate for redressing after your procedure. Do not wear jewelry (including body piercings), make-up, fingernail polish, lotion, powders or metal hairclips. 15. Contacts will need to be removed prior to surgery. You may want to bring your eye glasses to wear immediately before and after surgery. 14. Dentures will need to be removed before your procedure. 13. Bring cases for your glasses, contacts, dentures, or hearing aids to protect them while you are in surgery. 16. If you use a CPAP, please bring it with you on the day of your procedure. 17. Do not shave or wax for 72 hours prior to procedure near your operative site  18. FOR WOMAN OF CHILDBEARING AGE ONLY- please make sure we can collect a urine sample on arrival.     If you have further questions, you may contact your surgeon's office or us at [phone]     Left instructions on patient's voicemail.     Leonid Ponce RN.12/29/2021 .8:06 AM

## 2021-12-30 ENCOUNTER — HOSPITAL ENCOUNTER (OUTPATIENT)
Age: 56
Setting detail: OUTPATIENT SURGERY
Discharge: HOME | End: 2021-12-30
Attending: SURGERY | Admitting: SURGERY
Payer: COMMERCIAL

## 2021-12-30 ENCOUNTER — ANESTHESIA (OUTPATIENT)
Dept: OPERATING ROOM | Age: 56
End: 2021-12-30
Payer: COMMERCIAL

## 2021-12-30 VITALS
SYSTOLIC BLOOD PRESSURE: 126 MMHG | TEMPERATURE: 97.7 F | WEIGHT: 189 LBS | RESPIRATION RATE: 16 BRPM | HEART RATE: 92 BPM | BODY MASS INDEX: 31.49 KG/M2 | OXYGEN SATURATION: 94 % | DIASTOLIC BLOOD PRESSURE: 85 MMHG | HEIGHT: 65 IN

## 2021-12-30 VITALS — DIASTOLIC BLOOD PRESSURE: 67 MMHG | OXYGEN SATURATION: 100 % | SYSTOLIC BLOOD PRESSURE: 117 MMHG

## 2021-12-30 DIAGNOSIS — C50.911 MALIGNANT NEOPLASM OF RIGHT FEMALE BREAST, UNSPECIFIED ESTROGEN RECEPTOR STATUS, UNSPECIFIED SITE OF BREAST (HCC): ICD-10-CM

## 2021-12-30 DIAGNOSIS — G89.18 POST-OP PAIN: Primary | ICD-10-CM

## 2021-12-30 DIAGNOSIS — Z98.890 STATUS POST BREAST RECONSTRUCTION: ICD-10-CM

## 2021-12-30 LAB — PREGNANCY, URINE: NEGATIVE

## 2021-12-30 PROCEDURE — 3700000000 HC ANESTHESIA ATTENDED CARE: Performed by: SURGERY

## 2021-12-30 PROCEDURE — 3600000004 HC SURGERY LEVEL 4 BASE: Performed by: SURGERY

## 2021-12-30 PROCEDURE — 2580000003 HC RX 258: Performed by: SURGERY

## 2021-12-30 PROCEDURE — 7100000011 HC PHASE II RECOVERY - ADDTL 15 MIN: Performed by: SURGERY

## 2021-12-30 PROCEDURE — 2500000003 HC RX 250 WO HCPCS: Performed by: NURSE ANESTHETIST, CERTIFIED REGISTERED

## 2021-12-30 PROCEDURE — 6360000002 HC RX W HCPCS: Performed by: SURGERY

## 2021-12-30 PROCEDURE — 19318 BREAST REDUCTION: CPT | Performed by: SURGERY

## 2021-12-30 PROCEDURE — 7100000000 HC PACU RECOVERY - FIRST 15 MIN: Performed by: SURGERY

## 2021-12-30 PROCEDURE — 15772 GRFG AUTOL FAT LIPO EA ADDL: CPT | Performed by: SURGERY

## 2021-12-30 PROCEDURE — 7100000001 HC PACU RECOVERY - ADDTL 15 MIN: Performed by: SURGERY

## 2021-12-30 PROCEDURE — 2580000003 HC RX 258: Performed by: ANESTHESIOLOGY

## 2021-12-30 PROCEDURE — 6360000002 HC RX W HCPCS: Performed by: ANESTHESIOLOGY

## 2021-12-30 PROCEDURE — 2500000003 HC RX 250 WO HCPCS: Performed by: SURGERY

## 2021-12-30 PROCEDURE — C1729 CATH, DRAINAGE: HCPCS | Performed by: SURGERY

## 2021-12-30 PROCEDURE — 2709999900 HC NON-CHARGEABLE SUPPLY: Performed by: SURGERY

## 2021-12-30 PROCEDURE — 88305 TISSUE EXAM BY PATHOLOGIST: CPT

## 2021-12-30 PROCEDURE — 7100000010 HC PHASE II RECOVERY - FIRST 15 MIN: Performed by: SURGERY

## 2021-12-30 PROCEDURE — 2720000010 HC SURG SUPPLY STERILE: Performed by: SURGERY

## 2021-12-30 PROCEDURE — 15771 GRFG AUTOL FAT LIPO 50 CC/<: CPT | Performed by: SURGERY

## 2021-12-30 PROCEDURE — 84703 CHORIONIC GONADOTROPIN ASSAY: CPT

## 2021-12-30 PROCEDURE — 3600000014 HC SURGERY LEVEL 4 ADDTL 15MIN: Performed by: SURGERY

## 2021-12-30 PROCEDURE — 6360000002 HC RX W HCPCS: Performed by: NURSE ANESTHETIST, CERTIFIED REGISTERED

## 2021-12-30 PROCEDURE — 6370000000 HC RX 637 (ALT 250 FOR IP): Performed by: SURGERY

## 2021-12-30 PROCEDURE — 2500000003 HC RX 250 WO HCPCS: Performed by: ANESTHESIOLOGY

## 2021-12-30 PROCEDURE — 3700000001 HC ADD 15 MINUTES (ANESTHESIA): Performed by: SURGERY

## 2021-12-30 RX ORDER — SODIUM CHLORIDE, SODIUM LACTATE, POTASSIUM CHLORIDE, CALCIUM CHLORIDE 600; 310; 30; 20 MG/100ML; MG/100ML; MG/100ML; MG/100ML
INJECTION, SOLUTION INTRAVENOUS CONTINUOUS
Status: DISCONTINUED | OUTPATIENT
Start: 2021-12-30 | End: 2021-12-30 | Stop reason: HOSPADM

## 2021-12-30 RX ORDER — FENTANYL CITRATE 50 UG/ML
25 INJECTION, SOLUTION INTRAMUSCULAR; INTRAVENOUS EVERY 5 MIN PRN
Status: DISCONTINUED | OUTPATIENT
Start: 2021-12-30 | End: 2021-12-30 | Stop reason: HOSPADM

## 2021-12-30 RX ORDER — ONDANSETRON 2 MG/ML
INJECTION INTRAMUSCULAR; INTRAVENOUS PRN
Status: DISCONTINUED | OUTPATIENT
Start: 2021-12-30 | End: 2021-12-30 | Stop reason: SDUPTHER

## 2021-12-30 RX ORDER — CEPHALEXIN 500 MG/1
500 CAPSULE ORAL 4 TIMES DAILY
Qty: 40 CAPSULE | Refills: 0 | Status: SHIPPED | OUTPATIENT
Start: 2021-12-30 | End: 2022-01-09

## 2021-12-30 RX ORDER — PROPOFOL 10 MG/ML
INJECTION, EMULSION INTRAVENOUS PRN
Status: DISCONTINUED | OUTPATIENT
Start: 2021-12-30 | End: 2021-12-30 | Stop reason: SDUPTHER

## 2021-12-30 RX ORDER — OXYCODONE HYDROCHLORIDE AND ACETAMINOPHEN 5; 325 MG/1; MG/1
1 TABLET ORAL
Status: DISCONTINUED | OUTPATIENT
Start: 2021-12-30 | End: 2021-12-30 | Stop reason: HOSPADM

## 2021-12-30 RX ORDER — FENTANYL CITRATE 50 UG/ML
INJECTION, SOLUTION INTRAMUSCULAR; INTRAVENOUS PRN
Status: DISCONTINUED | OUTPATIENT
Start: 2021-12-30 | End: 2021-12-30 | Stop reason: SDUPTHER

## 2021-12-30 RX ORDER — LABETALOL HYDROCHLORIDE 5 MG/ML
5 INJECTION, SOLUTION INTRAVENOUS EVERY 10 MIN PRN
Status: DISCONTINUED | OUTPATIENT
Start: 2021-12-30 | End: 2021-12-30 | Stop reason: HOSPADM

## 2021-12-30 RX ORDER — PROMETHAZINE HYDROCHLORIDE 25 MG/ML
6.25 INJECTION, SOLUTION INTRAMUSCULAR; INTRAVENOUS
Status: DISCONTINUED | OUTPATIENT
Start: 2021-12-30 | End: 2021-12-30 | Stop reason: HOSPADM

## 2021-12-30 RX ORDER — MEPERIDINE HYDROCHLORIDE 25 MG/ML
12.5 INJECTION INTRAMUSCULAR; INTRAVENOUS; SUBCUTANEOUS EVERY 5 MIN PRN
Status: DISCONTINUED | OUTPATIENT
Start: 2021-12-30 | End: 2021-12-30 | Stop reason: HOSPADM

## 2021-12-30 RX ORDER — HYDRALAZINE HYDROCHLORIDE 20 MG/ML
5 INJECTION INTRAMUSCULAR; INTRAVENOUS EVERY 10 MIN PRN
Status: DISCONTINUED | OUTPATIENT
Start: 2021-12-30 | End: 2021-12-30 | Stop reason: HOSPADM

## 2021-12-30 RX ORDER — ONDANSETRON 2 MG/ML
4 INJECTION INTRAMUSCULAR; INTRAVENOUS
Status: DISCONTINUED | OUTPATIENT
Start: 2021-12-30 | End: 2021-12-30 | Stop reason: HOSPADM

## 2021-12-30 RX ORDER — FENTANYL CITRATE 50 UG/ML
50 INJECTION, SOLUTION INTRAMUSCULAR; INTRAVENOUS EVERY 5 MIN PRN
Status: DISCONTINUED | OUTPATIENT
Start: 2021-12-30 | End: 2021-12-30 | Stop reason: HOSPADM

## 2021-12-30 RX ORDER — MAGNESIUM HYDROXIDE 1200 MG/15ML
LIQUID ORAL CONTINUOUS PRN
Status: COMPLETED | OUTPATIENT
Start: 2021-12-30 | End: 2021-12-30

## 2021-12-30 RX ORDER — ROCURONIUM BROMIDE 10 MG/ML
INJECTION, SOLUTION INTRAVENOUS PRN
Status: DISCONTINUED | OUTPATIENT
Start: 2021-12-30 | End: 2021-12-30 | Stop reason: SDUPTHER

## 2021-12-30 RX ORDER — OXYCODONE HYDROCHLORIDE AND ACETAMINOPHEN 5; 325 MG/1; MG/1
1 TABLET ORAL EVERY 6 HOURS PRN
Qty: 28 TABLET | Refills: 0 | Status: SHIPPED | OUTPATIENT
Start: 2021-12-30 | End: 2022-01-06

## 2021-12-30 RX ORDER — OXYCODONE HYDROCHLORIDE AND ACETAMINOPHEN 5; 325 MG/1; MG/1
1 TABLET ORAL
Status: COMPLETED | OUTPATIENT
Start: 2021-12-30 | End: 2021-12-30

## 2021-12-30 RX ADMIN — SODIUM CHLORIDE, SODIUM LACTATE, POTASSIUM CHLORIDE, AND CALCIUM CHLORIDE: .6; .31; .03; .02 INJECTION, SOLUTION INTRAVENOUS at 12:13

## 2021-12-30 RX ADMIN — SUGAMMADEX 200 MG: 100 INJECTION, SOLUTION INTRAVENOUS at 13:04

## 2021-12-30 RX ADMIN — SODIUM CHLORIDE, SODIUM LACTATE, POTASSIUM CHLORIDE, AND CALCIUM CHLORIDE: .6; .31; .03; .02 INJECTION, SOLUTION INTRAVENOUS at 10:46

## 2021-12-30 RX ADMIN — FENTANYL CITRATE 50 MCG: 50 INJECTION INTRAMUSCULAR; INTRAVENOUS at 15:09

## 2021-12-30 RX ADMIN — ROCURONIUM BROMIDE 50 MG: 10 INJECTION INTRAVENOUS at 11:44

## 2021-12-30 RX ADMIN — TRANEXAMIC ACID 1000 MG: 1 INJECTION, SOLUTION INTRAVENOUS at 11:27

## 2021-12-30 RX ADMIN — HYDROMORPHONE HYDROCHLORIDE 0.5 MG: 1 INJECTION, SOLUTION INTRAMUSCULAR; INTRAVENOUS; SUBCUTANEOUS at 13:22

## 2021-12-30 RX ADMIN — ROCURONIUM BROMIDE 30 MG: 10 INJECTION INTRAVENOUS at 12:39

## 2021-12-30 RX ADMIN — PROPOFOL 200 MG: 10 INJECTION, EMULSION INTRAVENOUS at 11:12

## 2021-12-30 RX ADMIN — OXYCODONE HYDROCHLORIDE AND ACETAMINOPHEN 1 TABLET: 5; 325 TABLET ORAL at 16:00

## 2021-12-30 RX ADMIN — FENTANYL CITRATE 100 MCG: 50 INJECTION, SOLUTION INTRAMUSCULAR; INTRAVENOUS at 11:12

## 2021-12-30 RX ADMIN — ROCURONIUM BROMIDE 50 MG: 10 INJECTION INTRAVENOUS at 11:23

## 2021-12-30 RX ADMIN — HYDROMORPHONE HYDROCHLORIDE 0.5 MG: 1 INJECTION, SOLUTION INTRAMUSCULAR; INTRAVENOUS; SUBCUTANEOUS at 14:09

## 2021-12-30 RX ADMIN — ONDANSETRON 4 MG: 2 INJECTION INTRAMUSCULAR; INTRAVENOUS at 12:37

## 2021-12-30 ASSESSMENT — PULMONARY FUNCTION TESTS
PIF_VALUE: 22
PIF_VALUE: 22
PIF_VALUE: 24
PIF_VALUE: 1
PIF_VALUE: 24
PIF_VALUE: 22
PIF_VALUE: 22
PIF_VALUE: 23
PIF_VALUE: 25
PIF_VALUE: 26
PIF_VALUE: 24
PIF_VALUE: 21
PIF_VALUE: 22
PIF_VALUE: 26
PIF_VALUE: 20
PIF_VALUE: 22
PIF_VALUE: 24
PIF_VALUE: 22
PIF_VALUE: 28
PIF_VALUE: 22
PIF_VALUE: 23
PIF_VALUE: 28
PIF_VALUE: 16
PIF_VALUE: 15
PIF_VALUE: 23
PIF_VALUE: 8
PIF_VALUE: 22
PIF_VALUE: 25
PIF_VALUE: 23
PIF_VALUE: 22
PIF_VALUE: 22
PIF_VALUE: 23
PIF_VALUE: 24
PIF_VALUE: 23
PIF_VALUE: 26
PIF_VALUE: 3
PIF_VALUE: 23
PIF_VALUE: 7
PIF_VALUE: 22
PIF_VALUE: 23
PIF_VALUE: 22
PIF_VALUE: 25
PIF_VALUE: 26
PIF_VALUE: 31
PIF_VALUE: 22
PIF_VALUE: 21
PIF_VALUE: 22
PIF_VALUE: 21
PIF_VALUE: 19
PIF_VALUE: 25
PIF_VALUE: 0
PIF_VALUE: 31
PIF_VALUE: 23
PIF_VALUE: 22
PIF_VALUE: 19
PIF_VALUE: 27
PIF_VALUE: 35
PIF_VALUE: 23
PIF_VALUE: 22
PIF_VALUE: 28
PIF_VALUE: 26
PIF_VALUE: 27
PIF_VALUE: 24
PIF_VALUE: 22
PIF_VALUE: 24
PIF_VALUE: 23
PIF_VALUE: 21
PIF_VALUE: 26
PIF_VALUE: 25
PIF_VALUE: 25
PIF_VALUE: 22
PIF_VALUE: 26
PIF_VALUE: 21
PIF_VALUE: 22
PIF_VALUE: 2
PIF_VALUE: 8
PIF_VALUE: 18
PIF_VALUE: 20
PIF_VALUE: 3
PIF_VALUE: 22
PIF_VALUE: 22
PIF_VALUE: 25
PIF_VALUE: 22
PIF_VALUE: 3
PIF_VALUE: 23
PIF_VALUE: 19
PIF_VALUE: 23
PIF_VALUE: 22
PIF_VALUE: 0
PIF_VALUE: 0
PIF_VALUE: 8
PIF_VALUE: 0
PIF_VALUE: 22
PIF_VALUE: 25
PIF_VALUE: 22
PIF_VALUE: 21
PIF_VALUE: 8
PIF_VALUE: 1
PIF_VALUE: 25
PIF_VALUE: 18
PIF_VALUE: 22
PIF_VALUE: 2
PIF_VALUE: 0
PIF_VALUE: 17
PIF_VALUE: 22
PIF_VALUE: 22
PIF_VALUE: 1
PIF_VALUE: 22
PIF_VALUE: 20
PIF_VALUE: 2
PIF_VALUE: 21
PIF_VALUE: 1
PIF_VALUE: 25
PIF_VALUE: 5
PIF_VALUE: 0
PIF_VALUE: 22
PIF_VALUE: 1
PIF_VALUE: 22
PIF_VALUE: 21
PIF_VALUE: 24
PIF_VALUE: 14
PIF_VALUE: 20
PIF_VALUE: 19
PIF_VALUE: 24
PIF_VALUE: 22
PIF_VALUE: 14
PIF_VALUE: 0

## 2021-12-30 ASSESSMENT — PAIN DESCRIPTION - PAIN TYPE
TYPE: SURGICAL PAIN

## 2021-12-30 ASSESSMENT — PAIN DESCRIPTION - DESCRIPTORS
DESCRIPTORS: ACHING;DISCOMFORT

## 2021-12-30 ASSESSMENT — PAIN DESCRIPTION - PROGRESSION
CLINICAL_PROGRESSION: GRADUALLY WORSENING
CLINICAL_PROGRESSION: GRADUALLY WORSENING
CLINICAL_PROGRESSION: NOT CHANGED

## 2021-12-30 ASSESSMENT — PAIN DESCRIPTION - LOCATION
LOCATION: BREAST
LOCATION: BREAST
LOCATION: BREAST;HIP

## 2021-12-30 ASSESSMENT — LIFESTYLE VARIABLES: SMOKING_STATUS: 0

## 2021-12-30 ASSESSMENT — PAIN DESCRIPTION - ORIENTATION
ORIENTATION: LEFT
ORIENTATION: RIGHT;LEFT
ORIENTATION: LEFT

## 2021-12-30 ASSESSMENT — PAIN DESCRIPTION - ONSET
ONSET: PROGRESSIVE
ONSET: SUDDEN
ONSET: ON-GOING

## 2021-12-30 ASSESSMENT — PAIN SCALES - GENERAL
PAINLEVEL_OUTOF10: 8
PAINLEVEL_OUTOF10: 4
PAINLEVEL_OUTOF10: 8
PAINLEVEL_OUTOF10: 4
PAINLEVEL_OUTOF10: 8
PAINLEVEL_OUTOF10: 7

## 2021-12-30 ASSESSMENT — PAIN DESCRIPTION - FREQUENCY
FREQUENCY: INTERMITTENT
FREQUENCY: INTERMITTENT
FREQUENCY: CONTINUOUS

## 2021-12-30 NOTE — ANESTHESIA POSTPROCEDURE EVALUATION
Department of Anesthesiology  Postprocedure Note    Patient: Radha Johnson  MRN: [id number]  YOB: 1965  Date of evaluation: 12/30/2021  Time:  5:01 PM     Procedure Summary     Date: 12/30/21 Room / Location: [facility] [address] / [facility]    Anesthesia Start: 1106 Anesthesia Stop: 1316    Procedures:       LEFT BREAST REDUCTION/ (Left )      RIGHT BREAST FAT GRAFTING (Right ) Diagnosis:       Malignant neoplasm of right female breast, unspecified estrogen receptor status, unspecified site of breast ([facility] [address]      Status post breast reconstruction      (Malignant neoplasm of right female breast, unspecified estrogen receptor status, unspecified site of breast ([facility] [address] [C50.911] Status post breast reconstruction [N79.693])    Surgeons: Indu Macario MD Responsible Provider: Sindhu Jean Baptiste DO    Anesthesia Type: general ASA Status: 2          Anesthesia Type: general    Zeinab Phase I: Zeinab Score: 10    Zeinab Phase II: Zeinab Score: 10    Last vitals: Reviewed and per EMR flowsheets.        Anesthesia Post Evaluation    Patient location during evaluation: PACU  Patient participation: complete - patient participated  Level of consciousness: awake and alert  Pain score: 4  Airway patency: patent  Nausea & Vomiting: no nausea and no vomiting  Cardiovascular status: blood pressure returned to baseline  Respiratory status: acceptable  Hydration status: euvolemic

## 2021-12-30 NOTE — BRIEF OP NOTE
Brief Postoperative Note      Patient: Terra Hodges  YOB: 1965  MRN: [id number]    Date of Procedure: 12/30/2021    Pre-Op Diagnosis: Malignant neoplasm of right female breast, unspecified estrogen receptor status, unspecified site of [facility] ([facility] [C50.911] Status post breast reconstruction [Z98.890]    Post-Op Diagnosis: Same       Procedure(s):  LEFT BREAST REDUCTION/  RIGHT BREAST FAT GRAFTING    Surgeon(s):  Isaac Nelson MD    Assistant:  Surgical Assistant: Earline Varner    Anesthesia: General    Estimated Blood Loss (mL): Minimal    Complications: None    Specimens:   ID Type Source Tests Collected by Time Destination   A : A. LEFT BREAST Tissue Tissue SURGICAL PATHOLOGY Isaac Nelson MD 12/30/2021 1147        Implants:  * No implants in log *      Drains:   [REMOVED] Urethral Catheter 15 fr (Removed)       Findings: Left reduction to match. Fat grafting performed to the right scar defect.     Electronically signed by Jc Hidalgo DO on 12/30/2021 at 1:14 PM

## 2021-12-30 NOTE — PROGRESS NOTES
Friend called and updated. Stated she would like RX's to be filled here. RX x 2 sent to outpatient pharmacy. Patient still with pain medicated again earlier with dilaudid. Continue to monitor.

## 2021-12-30 NOTE — H&P
May Fall    [id number]    [address] [facility]  Department of General Surgery   Surgical Service   Pre-operative History and Physical  Last H & P within the last 30 days. DIAGNOSIS:   Malignant neoplasm of right female breast, unspecified estrogen receptor status, unspecified site of breast ([facility] [address] [C50.911]  Status post breast reconstruction [Z98.890]    Procedure(s):  LEFT BREAST REDUCTION/  RIGHT BREAST FAT GRAFTING    History obtained from: Patient interview and EHR      HISTORY OF PRESENT ILLNESS:   Patient is a 48 y/o female with c/o breast asymmetry. She was diagnosed with invasive ductal carcinoma of the right breast in 2005, and had subsequent lumpectomy. She has since completed radiation and adjuvant chemotherapy. She wishes for improved breast symmetry, and presents today for the above procedures. Covid 19:  Patient denies fever, chills, worsening cough, or known exposure to Covid-19.        Past Medical History:        Diagnosis Date    Anxiety     Breast cancer ([facility] [address]     at 47    Cervical disc disorder at C6-C7 level with myelopathy 2012    Cervical spondylarthritis     Class 1 obesity due to excess calories without serious comorbidity with body mass index (BMI) of 30.0 to 30.9 in adult 08/06/2018    COVID-19 01/2021    Depression     Hyperlipidemia 10/08/2021    Hypothyroidism     Migraine     hx depakote, nadalol, topamax (stomach upset)    Neuropathy     Reactive hypertension 12/22/2021    TMJ (temporomandibular joint syndrome)     TMJ disease     Urinary incontinence 08/24/2016     Past Surgical History:        Procedure Laterality Date    BREAST LUMPECTOMY      right at  48 yo     KNEE SURGERY Right 2013    SPINAL FUSION  2013    TONSILLECTOMY       Past Social History:  Social History     Socioeconomic History    Marital status:      Spouse name: Warren    Number of children: 4    Years of education: Not on file    Highest education level: Not on file   Occupational History    Occupation: part time [id number]   Tobacco Use    Smoking status: Never Smoker    Smokeless tobacco: Never Used   Substance and Sexual Activity    Alcohol use: Yes    Drug use: Not on file    Sexual activity: Yes     Partners: Male   Other Topics Concern    Not on file   Social History Narrative    Not on file     Social Determinants of Health     Financial Resource Strain: Low Risk     Difficulty of Paying Living Expenses: Not hard at all   Food Insecurity: No Food Insecurity    Worried About Running Out of Food in the Last Year: Never true    [address] in the Last Year: Never true   Transportation Needs:     Lack of Transportation (Medical): Not on file    Lack of Transportation (Non-Medical): Not on file   Physical Activity:     Days of Exercise per Week: Not on file    Minutes of Exercise per Session: Not on file   Stress:     Feeling of Stress : Not on file   Social Connections:     Frequency of Communication with Friends and Family: Not on file    Frequency of Social Gatherings with Friends and Family: Not on file    Attends Muslim Services: Not on file    Active Member of [address] [place] [address] or Organizations: Not on file    Attends Club or Organization Meetings: Not on file    Marital Status: Not on file   Intimate Partner Violence:     Fear of Current or Ex-Partner: Not on file    Emotionally Abused: Not on file    Physically Abused: Not on file    Sexually Abused: Not on file   Housing Stability:     Unable to Pay for Housing in the Last Year: Not on file    Number of [place] in the Last Year: Not on file    Unstable Housing in the Last Year: Not on file         Medications Prior to Admission:      Prior to Admission medications    Medication Sig Start Date End Date Taking?  Authorizing Provider   atenolol (TENORMIN) 25 MG tablet Take 1 tablet by mouth daily 12/22/21   Gael Morales MD   omeprazole (PRILOSEC) 20 MG delayed release capsule One po qd 11/18/21   Elvi Law MD   baclofen (LIORESAL) 10 MG tablet TAKE 1 TABLET (10 MG TOTAL) BY MOUTH 3 TIMES A DAY. 1 BY MOUTH TWICE A DAY AS NEEDED HEADACHE 9/19/21   Historical Provider, MD   EMGALITY 120 MG/ML SOAJ  10/1/21   Historical Provider, MD   clonazePAM (KLONOPIN) 1 MG tablet Take 1 tablet by mouth 2 times daily as needed for Anxiety for up to 30 days. 10/8/21 12/22/21  Elvi Law MD   butalbital-acetaminophen-caffeine (FIORICET, ESGIC) -20 MG per tablet Take 1 tablet by mouth every 6 hours as needed for Headaches 10/8/21   Elvi Law MD   levothyroxine (SYNTHROID) 200 MCG tablet Take 1 tablet by mouth daily 10/8/21   Elvi Law MD   SUMAtriptan (IMITREX) 100 MG tablet TAKE ONE TABLET BY MOUTH AT ONSET OF HEADACHE. MAY REPEAT IN 2 HOURS MAX OF 2 TABS PER 24 HOURS 8/19/21   Elvi Law MD   amitriptyline (ELAVIL) 25 MG tablet TAKE 3 TABLETS BY MOUTH AT BEDTIME 8/17/21   HOLLIE Cunha   buPROPion (WELLBUTRIN XL) 300 MG extended release tablet TAKE 1 TABLET BY MOUTH EVERY DAY IN THE MORNING 7/13/21   Elvi Law MD   escitalopram (LEXAPRO) 20 MG tablet TAKE 1 TABLET BY MOUTH EVERY DAY 7/12/21   Elvi Law MD   propranolol (INDERAL) 10 MG tablet Take 1 tablet by mouth 2 times daily 5/11/20   Elvi Law MD   primidone (MYSOLINE) 50 MG tablet Take 100 mg by mouth nightly.     Historical Provider, MD         Allergies:  Heparin    PHYSICAL EXAM:      BP (!) 129/93   Pulse 83   Temp 98.5 °F (36.9 °C) (Temporal)   Resp 16   Ht 5' 5\" (1.651 m)   Wt 189 lb (85.7 kg)   SpO2 98%   BMI 31.45 kg/m²      Airway:  Airway patent with no audible stridor    Heart:  Regular rate and rhythm, No murmur noted    Lungs:  No increased work of breathing, good air exchange, clear to auscultation bilaterally, no crackles or wheezing    Abdomen:  Soft, non-distended, non-tender, no rebound tenderness or guarding, and no masses palpated    ASSESSMENT AND PLAN     Patient is a 49 y.o. female with above specified procedure planned. 1.  The patients history and physical was obtained and signed off by the pre-admission testing department. Patient seen and focused exam done today- no new changes since last physical exam on 12/22/2021.    2.  Access to ancillary services are available per request of the provider.     HOLLIE Eddy - JACKSON     12/30/2021

## 2021-12-30 NOTE — PROGRESS NOTES
Ambulatory Surgery/Procedure Discharge Note    Vitals:    12/30/21 1551   BP: 126/85   Pulse: 92   Resp: 16   Temp: 97.7 °F (36.5 °C)   SpO2: 94%       In: -   Out: 5 [Drains:5]    Restroom use offered before discharge. yes  Pain assessment:  Pain Level: 4 Pain pill given on discharge. Patient discharged to home/self care.  Patient discharged via wheel chair by transporter to waiting family/S.O.       12/30/2021 5:01 PM

## 2021-12-30 NOTE — ANESTHESIA PRE PROCEDURE
Department of Anesthesiology  Preprocedure Note       Name:  Janay Mckeon   Age:  49 y.o.  :  1965                                          MRN:  [id number]         Date:  2021      Surgeon: Bessy Pratt):  Mattie Diaz MD    Procedure: Procedure(s):  LEFT BREAST REDUCTION/  RIGHT BREAST FAT GRAFTING    Medications prior to admission:   Prior to Admission medications    Medication Sig Start Date End Date Taking? Authorizing Provider   atenolol (TENORMIN) 25 MG tablet Take 1 tablet by mouth daily 21  Yes Dami Ernst MD   omeprazole (PRILOSEC) 20 MG delayed release capsule One po qd 21  Yes Dami Ernst MD   clonazePAM (KLONOPIN) 1 MG tablet Take 1 tablet by mouth 2 times daily as needed for Anxiety for up to 30 days. 10/8/21 12/30/21 Yes Dami Ernst MD   butalbital-acetaminophen-caffeine (FIORICET, ESGIC) -70 MG per tablet Take 1 tablet by mouth every 6 hours as needed for Headaches 10/8/21  Yes Dami Ernst MD   levothyroxine (SYNTHROID) 200 MCG tablet Take 1 tablet by mouth daily 10/8/21  Yes Dami Ernst MD   amitriptyline (ELAVIL) 25 MG tablet TAKE 3 TABLETS BY MOUTH AT BEDTIME 21  Yes HOLLIE Menjivar   buPROPion (WELLBUTRIN XL) 300 MG extended release tablet TAKE 1 TABLET BY MOUTH EVERY DAY IN THE MORNING 21  Yes Dami Ernst MD   escitalopram (LEXAPRO) 20 MG tablet TAKE 1 TABLET BY MOUTH EVERY DAY 21  Yes Dami Ernst MD   propranolol (INDERAL) 10 MG tablet Take 1 tablet by mouth 2 times daily 20  Yes Dami Ernst MD   primidone (MYSOLINE) 50 MG tablet Take 100 mg by mouth nightly. Yes Historical Provider, MD   baclofen (LIORESAL) 10 MG tablet TAKE 1 TABLET (10 MG TOTAL) BY MOUTH 3 TIMES A DAY. 1 BY MOUTH TWICE A DAY AS NEEDED HEADACHE 21   Historical Provider, MD   EMGALITY 120 MG/ML SOAJ  10/1/21   Historical Provider, MD   SUMAtriptan (IMITREX) 100 MG tablet TAKE ONE TABLET BY MOUTH AT ONSET OF HEADACHE.  MAY REPEAT IN 2 HOURS MAX OF 2 TABS PER 24 HOURS 8/19/21   Lynne Dukes MD       Current medications:    Current Facility-Administered Medications   Medication Dose Route Frequency Provider Last Rate Last Admin    ceFAZolin (ANCEF) 2000 mg in dextrose 5 % 50 mL IVPB  2,000 mg IntraVENous Once Tomas Trent MD        tranexamic acid (CYKLOKAPRON) 1,000 mg in sodium chloride 0.9 % 50 mL IVPB  1,000 mg IntraVENous Once Tomas Trent MD        lactated ringers infusion   IntraVENous Continuous Doll Salter, DO           Allergies:     Allergies   Allergen Reactions    Heparin      Hives        Problem List:    Patient Active Problem List   Diagnosis Code    Hypothyroid E03.9    TMJ syndrome M26.629    Depression, major F32.9    Anxiety disorder F41.9    Headache R51.9    Migraine G43.909    URI, acute J06.9    Rib cage region somatic dysfunction M99.08    Abdominal pain R10.9    Rectal bleed K62.5    Constipation K59.00    Urinary incontinence R32    Class 1 obesity due to excess calories without serious comorbidity with body mass index (BMI) of 30.0 to 30.9 in adult E66.09, Z68.30    Panic attacks F41.0    Hyperlipidemia E78.5    Reactive hypertension I10       Past Medical History:        Diagnosis Date    Anxiety     Breast cancer ([facility] [address]     at 47    Cervical disc disorder at C6-C7 level with myelopathy 2012    Cervical spondylarthritis     Class 1 obesity due to excess calories without serious comorbidity with body mass index (BMI) of 30.0 to 30.9 in adult 08/06/2018    COVID-19 01/2021    Depression     Hyperlipidemia 10/08/2021    Hypothyroidism     Migraine     hx depakote, nadalol, topamax (stomach upset)    Neuropathy     Reactive hypertension 12/22/2021    TMJ (temporomandibular joint syndrome)     TMJ disease     Urinary incontinence 08/24/2016       Past Surgical History:        Procedure Laterality Date    BREAST LUMPECTOMY      right at  46 yo     KNEE SURGERY Right 2013    SPINAL FUSION  2013    TONSILLECTOMY         Social History:    Social History     Tobacco Use    Smoking status: Never Smoker    Smokeless tobacco: Never Used   Substance Use Topics    Alcohol use: Yes                                Counseling given: Not Answered      Vital Signs (Current):   Vitals:    12/21/21 1549 12/30/21 0941   BP:  (!) 129/93   Pulse:  83   Resp:  16   Temp:  98.5 °F (36.9 °C)   TempSrc:  Temporal   SpO2:  98%   Weight: 189 lb (85.7 kg) 189 lb (85.7 kg)   Height: 5' 5\" (1.651 m) 5' 5\" (1.651 m)                                              BP Readings from Last 3 Encounters:   12/30/21 (!) 129/93   12/22/21 (!) 144/92   10/20/21 (!) 160/101       NPO Status: Time of last liquid consumption: 2100                        Time of last solid consumption: 2100                        Date of last liquid consumption: 12/29/21                        Date of last solid food consumption: 12/29/21    BMI:   Wt Readings from Last 3 Encounters:   12/30/21 189 lb (85.7 kg)   12/22/21 191 lb 11.2 oz (87 kg)   10/20/21 189 lb (85.7 kg)     Body mass index is 31.45 kg/m². CBC:   Lab Results   Component Value Date    WBC 6.0 12/22/2021    RBC 5.03 12/22/2021    HGB 14.4 12/22/2021    HCT 44.3 12/22/2021    MCV 88.1 12/22/2021    RDW 13.6 12/22/2021     12/22/2021       CMP:   Lab Results   Component Value Date     12/22/2021    K 4.8 12/22/2021     12/22/2021    CO2 27 12/22/2021    BUN 18 12/22/2021    CREATININE 0.9 12/22/2021    GFRAA >60 12/22/2021    AGRATIO 1.6 12/22/2021    LABGLOM >60 12/22/2021    GLUCOSE 110 12/22/2021    PROT 7.2 12/22/2021    CALCIUM 8.9 12/22/2021    BILITOT <0.2 12/22/2021    ALKPHOS 82 12/22/2021    AST 18 12/22/2021    ALT 20 12/22/2021       POC Tests: No results for input(s): POCGLU, POCNA, POCK, POCCL, POCBUN, POCHEMO, POCHCT in the last 72 hours.     Coags:   Lab Results   Component Value Date    PROTIME 10.5 12/22/2021    INR 0.93 12/22/2021 APTT 35.9 12/22/2021       HCG (If Applicable):   Lab Results   Component Value Date    PREGTESTUR Negative 12/30/2021        ABGs: No results found for: PHART, PO2ART, KKG8ZUE, EQO5PSZ, BEART, E5DHPCZE     Type & Screen (If Applicable):  No results found for: LABABO, LABRH    Drug/Infectious Status (If Applicable):  No results found for: HIV, HEPCAB    COVID-19 Screening (If Applicable): No results found for: COVID19        Anesthesia Evaluation  Patient summary reviewed and Nursing notes reviewed no history of anesthetic complications:   Airway: Mallampati: I  TM distance: >3 FB   Neck ROM: full  Mouth opening: > = 3 FB Dental: normal exam         Pulmonary:       (-) not a current smoker                           Cardiovascular:  Exercise tolerance: good (>4 METS),   (+) hypertension:,         Rhythm: regular  Rate: normal                    Neuro/Psych:   (+) psychiatric history:depression/anxiety             GI/Hepatic/Renal:            ROS comment: obese. Endo/Other:    (+) hypothyroidism: arthritis: OA., .                  ROS comment: H/o breast CA Abdominal:             Vascular: Other Findings:             Anesthesia Plan      general     ASA 2       Induction: intravenous. MIPS: Prophylactic antiemetics administered. Anesthetic plan and risks discussed with patient. Plan discussed with CRNA.                   Jason Staton DO   12/30/2021

## 2021-12-30 NOTE — PROGRESS NOTES
PACU Transfer to [facility]  Pt's Current Allergies: Heparin    Pt meets criteria to transfer to next phase of care per KARLY SCORE and ASPAN standards    No results for input(s): POCGLU in the last 72 hours. Vitals:    12/30/21 1531   BP: 111/83   Pulse: 75   Resp: 17   Temp: 98.5 °F (36.9 °C)   SpO2: 95%      BP within 20% of pt's admitting BP as per Karly Score      Intake/Output Summary (Last 24 hours) at 12/30/2021 1552  Last data filed at 12/30/2021 1316  Gross per 24 hour   Intake 1400 ml   Output 250 ml   Net 1150 ml         Pain assessment:  receiving treatment  Pain Level: 4    Patient was assessed for unknown alterations to skin integrity. There were not unknown alterations observed. Patient transferred to care of [facility] RN.  Report given per telephone to [address] [place] [address] updated and picked up RX in outpatient pharmacy directed to [facility]    12/30/2021 3:52 PM

## 2021-12-30 NOTE — PROGRESS NOTES
Patient arrived from OR to PACU # 7 s/p  LEFT BREAST REDUCTION/ (Left ), RIGHT BREAST FAT GRAFTING (Right ) per . Attached to PACU monitoring device, report received from CRNA who reported no problems intraoperatively. Attached to PACU monitoring device, report received from CRNA who stated no problems intraoperatively.  Arrived drowsy but c/o pain on arrival.

## 2021-12-31 NOTE — OP NOTE
[place] [address] SURGERY     OPERATIVE DICTATION    NAME: Felicia Barragan   MRN: [id number]  DATE: 12/30/2021    AGE: 49 y.o. SURGEON: Miguelangel Stewart MD  ASSISTANT: Federico BARAHONA)     PREOPERATIVE DIAGNOSIS: Right breast cancer, breast asymmetry status post partial mastectomy  POSTOPERATIVE DIAGNOSIS: Same     OPERATION: 1) Fat grafting for right breast reconstruction      2) Left breast reduction     ANESTHESIA: General     ESTIMATED BLOOD LOSS: 50 mL    OPERATIVE INDICATIONS: This is a 49 y.o. female who previously has undergone a partial mastectomy for right breast cancer with adjuvant radiation therapy in 2005. She has had issues with significant asymmetry as well as contour deformity after lumpectomy and radiation therapy. The plan of surgery, risks, benefits, alternatives, indications, limitations, possible complications, and future surgeries (including multiple rounds of fat grafting) were discussed with the patient and she agreed to proceed. OPERATIVE PROCEDURE: The patient was marked in the standing position in the preoperative holding area. She was then brought to the operating room and placed in the supine position on the operating table. After satisfactory induction with general endotracheal anesthesia, the patient was then prepped and draped in the usual sterile fashion. The case began by infiltrating tumescent solution into the abdomen. Attention was then directed to the left breast. A wise pattern was then scored using markings. De-epithelialization was performed along the inferior aspect of the breast to create an  inferior pedicle. Breast tissue was removed from the lateral, medial, superior, and posterior aspects of the breast pedicle. The patient was then  tailor-tacked, sat up to check for symmetry. Once symmetry was assessed to  be satisfactory, the patient was then sat back down. The breast opened up.   The breast was then copiously irrigated and hemostasis with  electrocautery, clips, and ties. Once it was satisfactory, the wound was  then again tailor-tacked and then closed in layers using 3-0 Monocryl sutures. The nipple was then brought through the superior aspect of the vertical incision and was then sutured in place using 3-0 Monocryl suture. Attention was then directed back to the abdomen and liposuction was performed using a [employer] system. A total of 130 cc of lipoaspirate was utilized for fat grafting to the right breast. Subcision was performed followed by injection with significant improvement in contour. The sites were then closed using 5-0 Fast Gut. Sterile dressings were then applied. The patient was then awakened, extubated, and taken to the PACU in stable condition. At the end of the case, all counts were correct and there were no immediate complications. The patient tolerated the procedure well.     DRAINS: None    SPECIMEN: None    CONDITION: Stable    Vinh Paige MD

## 2022-01-05 ENCOUNTER — OFFICE VISIT (OUTPATIENT)
Dept: SURGERY | Age: 57
End: 2022-01-05

## 2022-01-05 VITALS
HEIGHT: 65 IN | DIASTOLIC BLOOD PRESSURE: 85 MMHG | RESPIRATION RATE: 16 BRPM | SYSTOLIC BLOOD PRESSURE: 125 MMHG | TEMPERATURE: 98 F | BODY MASS INDEX: 32.15 KG/M2 | WEIGHT: 193 LBS | OXYGEN SATURATION: 98 %

## 2022-01-05 DIAGNOSIS — Z09 POSTOP CHECK: Primary | ICD-10-CM

## 2022-01-05 PROCEDURE — 99024 POSTOP FOLLOW-UP VISIT: CPT | Performed by: SURGERY

## 2022-01-05 NOTE — PROGRESS NOTES
[facility]    PROCEDURE: 1) Fat grafting for right breast reconstruction                          2) Left breast reduction   DATE: 12/30/21    Lorri Powell has been recovering well since her procedure. Pain has been well controlled without pain medications. EXAM    /85   Temp 98 °F (36.7 °C)   Resp 16   Ht 5' 5\" (1.651 m)   Wt 193 lb (87.5 kg)   SpO2 98%   BMI 32.12 kg/m²     GEN: NAD   BREAST: Incision healing well Good contour / nipple viable Drain serosang Improved contour on the right breast  ABD: Improved contour with minimal ecchymosis    IMP: 49 y. o.female s/p fat grafting to R breast and left reduction  PLAN: Drain removed. Will follow-up in 1 month. She is thus far happy with her results.      Wilfred Rendon MD  [address] [place] [address] [address] Reconstructive Surgery  [phone]  01/05/22

## 2022-01-10 RX ORDER — ESCITALOPRAM OXALATE 20 MG/1
TABLET ORAL
Qty: 30 TABLET | Refills: 5 | Status: SHIPPED | OUTPATIENT
Start: 2022-01-10 | End: 2022-07-13

## 2022-01-10 NOTE — TELEPHONE ENCOUNTER
Medication:   Requested Prescriptions     Pending Prescriptions Disp Refills    escitalopram (LEXAPRO) 20 MG tablet [Pharmacy Med Name: ESCITALOPRAM 20 MG TABLET] 30 tablet 5     Sig: TAKE 1 TABLET BY MOUTH EVERY DAY        Last Filled:      Patient Phone Number: [phone] (home)     Last appt:  12/22/21  Next appt: Visit date not found    Last OARRS:   RX Monitoring 8/6/2018   Attestation The Prescription Monitoring Report for this patient was reviewed today. Periodic Controlled Substance Monitoring Possible medication side effects, risk of tolerance/dependence & alternative treatments discussed. ;No signs of potential drug abuse or diversion identified.

## 2022-02-04 DIAGNOSIS — M79.2 NEUROLOGICAL PAIN DISORDER: ICD-10-CM

## 2022-02-07 RX ORDER — AMITRIPTYLINE HYDROCHLORIDE 25 MG/1
TABLET, FILM COATED ORAL
Qty: 90 TABLET | Refills: 1 | Status: SHIPPED | OUTPATIENT
Start: 2022-02-07 | End: 2022-03-04

## 2022-02-09 ENCOUNTER — OFFICE VISIT (OUTPATIENT)
Dept: SURGERY | Age: 57
End: 2022-02-09

## 2022-02-09 VITALS
BODY MASS INDEX: 30.99 KG/M2 | HEIGHT: 65 IN | SYSTOLIC BLOOD PRESSURE: 126 MMHG | WEIGHT: 186 LBS | DIASTOLIC BLOOD PRESSURE: 84 MMHG

## 2022-02-09 DIAGNOSIS — Z09 POSTOP CHECK: Primary | ICD-10-CM

## 2022-02-09 PROCEDURE — 99024 POSTOP FOLLOW-UP VISIT: CPT | Performed by: SURGERY

## 2022-02-09 NOTE — PROGRESS NOTES
[facility]    PROCEDURE: 1) Fat grafting for right breast reconstruction                          2) Left breast reduction   DATE: 12/30/21    Lorri Powell has been recovering well since her procedure. Pain has been well controlled without pain medications. EXAM    /84   Ht 5' 5\" (1.651 m)   Wt 186 lb (84.4 kg)   BMI 30.95 kg/m²     GEN: NAD   BREAST: Incision healing well Good contour / nipple viable Improved contour on the right breast  ABD: Improved contour    IMP: 49 y. o.female s/p fat grafting to R breast and left reduction  PLAN: She is very happy with her results and symmetry. Will return in a few months to ensure continued wound healing success.     Carson Blair MD  [address] [place] [address] [address] Reconstructive Surgery  [phone]  02/09/22

## 2022-03-03 DIAGNOSIS — M79.2 NEUROLOGICAL PAIN DISORDER: ICD-10-CM

## 2022-03-04 RX ORDER — AMITRIPTYLINE HYDROCHLORIDE 25 MG/1
TABLET, FILM COATED ORAL
Qty: 90 TABLET | Refills: 1 | Status: SHIPPED | OUTPATIENT
Start: 2022-03-04 | End: 2022-03-30

## 2022-03-11 DIAGNOSIS — F41.0 PANIC ATTACKS: ICD-10-CM

## 2022-03-14 NOTE — TELEPHONE ENCOUNTER
Medication:   Requested Prescriptions     Pending Prescriptions Disp Refills    clonazePAM (KLONOPIN) 1 MG tablet [Pharmacy Med Name: CLONAZEPAM 1 MG TABLET] 60 tablet 0     Sig: TAKE 1 TABLET BY MOUTH 2 TIMES DAILY AS NEEDED FOR ANXIETY FOR UP TO 30 DAYS. Last Filled:      Patient Phone Number: [phone] (home)     Last appt: 12/22/2021   Next appt: 4/8/2022    Last OARRS:   RX Monitoring 8/6/2018   Attestation The Prescription Monitoring Report for this patient was reviewed today. Periodic Controlled Substance Monitoring Possible medication side effects, risk of tolerance/dependence & alternative treatments discussed. ;No signs of potential drug abuse or diversion identified.

## 2022-03-16 RX ORDER — CLONAZEPAM 1 MG/1
1 TABLET ORAL 2 TIMES DAILY PRN
Qty: 30 TABLET | Refills: 0 | Status: SHIPPED | OUTPATIENT
Start: 2022-03-16 | End: 2022-06-09 | Stop reason: SDUPTHER

## 2022-03-29 DIAGNOSIS — M79.2 NEUROLOGICAL PAIN DISORDER: ICD-10-CM

## 2022-03-30 DIAGNOSIS — E03.9 HYPOTHYROIDISM, UNSPECIFIED TYPE: ICD-10-CM

## 2022-03-30 RX ORDER — AMITRIPTYLINE HYDROCHLORIDE 25 MG/1
TABLET, FILM COATED ORAL
Qty: 90 TABLET | Refills: 1 | Status: SHIPPED | OUTPATIENT
Start: 2022-03-30 | End: 2022-05-06 | Stop reason: SDUPTHER

## 2022-03-30 RX ORDER — LEVOTHYROXINE SODIUM 0.2 MG/1
TABLET ORAL
Qty: 30 TABLET | Refills: 5 | Status: SHIPPED | OUTPATIENT
Start: 2022-03-30 | End: 2022-10-10

## 2022-03-30 NOTE — TELEPHONE ENCOUNTER
Medication:   Requested Prescriptions     Pending Prescriptions Disp Refills    levothyroxine (SYNTHROID) 200 MCG tablet [Pharmacy Med Name: LEVOTHYROXINE 200 MCG TABLET] 30 tablet 5     Sig: TAKE 1 TABLET BY MOUTH EVERY DAY            Patient Phone Number: [phone] (home)     Last appt: 12/22/2021   Next appt: 4/8/2022

## 2022-04-08 ENCOUNTER — OFFICE VISIT (OUTPATIENT)
Dept: FAMILY MEDICINE CLINIC | Age: 57
End: 2022-04-08
Payer: COMMERCIAL

## 2022-04-08 VITALS
TEMPERATURE: 98.1 F | BODY MASS INDEX: 31.37 KG/M2 | OXYGEN SATURATION: 98 % | HEIGHT: 65 IN | WEIGHT: 188.3 LBS | HEART RATE: 75 BPM | RESPIRATION RATE: 16 BRPM | SYSTOLIC BLOOD PRESSURE: 118 MMHG | DIASTOLIC BLOOD PRESSURE: 76 MMHG

## 2022-04-08 DIAGNOSIS — E78.5 HYPERLIPIDEMIA, UNSPECIFIED HYPERLIPIDEMIA TYPE: ICD-10-CM

## 2022-04-08 DIAGNOSIS — F41.1 GENERALIZED ANXIETY DISORDER: ICD-10-CM

## 2022-04-08 DIAGNOSIS — I10 BENIGN ESSENTIAL HTN: Primary | ICD-10-CM

## 2022-04-08 DIAGNOSIS — Z13.1 SCREENING FOR DIABETES MELLITUS: ICD-10-CM

## 2022-04-08 DIAGNOSIS — E66.09 CLASS 1 OBESITY DUE TO EXCESS CALORIES WITHOUT SERIOUS COMORBIDITY WITH BODY MASS INDEX (BMI) OF 31.0 TO 31.9 IN ADULT: ICD-10-CM

## 2022-04-08 PROBLEM — E66.811 CLASS 1 OBESITY DUE TO EXCESS CALORIES WITHOUT SERIOUS COMORBIDITY WITH BODY MASS INDEX (BMI) OF 31.0 TO 31.9 IN ADULT: Status: ACTIVE | Noted: 2022-04-08

## 2022-04-08 LAB
CHOLESTEROL, TOTAL: 217 MG/DL (ref 0–199)
HDLC SERPL-MCNC: 65 MG/DL (ref 40–60)
LDL CHOLESTEROL CALCULATED: 132 MG/DL
TRIGL SERPL-MCNC: 98 MG/DL (ref 0–150)
VLDLC SERPL CALC-MCNC: 20 MG/DL

## 2022-04-08 PROCEDURE — 99214 OFFICE O/P EST MOD 30 MIN: CPT | Performed by: FAMILY MEDICINE

## 2022-04-08 RX ORDER — PHENTERMINE HYDROCHLORIDE 37.5 MG/1
37.5 TABLET ORAL
Qty: 30 TABLET | Refills: 0 | Status: SHIPPED | OUTPATIENT
Start: 2022-04-08 | End: 2022-05-09 | Stop reason: SDUPTHER

## 2022-04-08 ASSESSMENT — PATIENT HEALTH QUESTIONNAIRE - PHQ9
10. IF YOU CHECKED OFF ANY PROBLEMS, HOW DIFFICULT HAVE THESE PROBLEMS MADE IT FOR YOU TO DO YOUR WORK, TAKE CARE OF THINGS AT HOME, OR GET ALONG WITH OTHER PEOPLE: 0
2. FEELING DOWN, DEPRESSED OR HOPELESS: 1
3. TROUBLE FALLING OR STAYING ASLEEP: 1
SUM OF ALL RESPONSES TO PHQ QUESTIONS 1-9: 11
6. FEELING BAD ABOUT YOURSELF - OR THAT YOU ARE A FAILURE OR HAVE LET YOURSELF OR YOUR FAMILY DOWN: 3
8. MOVING OR SPEAKING SO SLOWLY THAT OTHER PEOPLE COULD HAVE NOTICED. OR THE OPPOSITE, BEING SO FIGETY OR RESTLESS THAT YOU HAVE BEEN MOVING AROUND A LOT MORE THAN USUAL: 3
1. LITTLE INTEREST OR PLEASURE IN DOING THINGS: 1
4. FEELING TIRED OR HAVING LITTLE ENERGY: 1
5. POOR APPETITE OR OVEREATING: 1
SUM OF ALL RESPONSES TO PHQ9 QUESTIONS 1 & 2: 2
SUM OF ALL RESPONSES TO PHQ QUESTIONS 1-9: 11
SUM OF ALL RESPONSES TO PHQ QUESTIONS 1-9: 11
7. TROUBLE CONCENTRATING ON THINGS, SUCH AS READING THE NEWSPAPER OR WATCHING TELEVISION: 0
SUM OF ALL RESPONSES TO PHQ QUESTIONS 1-9: 11
9. THOUGHTS THAT YOU WOULD BE BETTER OFF DEAD, OR OF HURTING YOURSELF: 0

## 2022-04-08 NOTE — PROGRESS NOTES
Subjective:      Patient ID: Rick Clark is a 49 y.o. female. Hypertension    bp at home improved , denies  ha, visual changes, syncope, presyncope, cp, sob, palpitations, edema, ventura, orthopnea, pnd,  Compliant with medication, no secondary effects   tx atenolol     Other  This is a chronic (obesity) problem. The current episode started more than 1 year ago. The problem occurs constantly. The problem has been unchanged. Nothing aggravates the symptoms. Treatments tried: low carb diet, exercisig 4 x week,  The treatment provided no relief. Planning of getting an abdominoplasty in the future but was told she needed to loss weight first   Interested in Adipex that helped in the past w/o sec effects. Depression   \"Still having good and not so good days \"  tx Lexapro and Wellbutrin   Still having problems falling asleep, elevil seems to help but feels \"groggy\" in am.     Hyperlipidemia  Has been more compliant with diet and exercise  tx no med. Review of Systems    Objective:  Blood pressure 118/76, pulse 75, temperature 98.1 °F (36.7 °C), temperature source Tympanic, resp. rate 16, height 5' 5\" (1.651 m), weight 188 lb 4.8 oz (85.4 kg), SpO2 98 %, not currently breastfeeding. Physical Exam  Vitals and nursing note reviewed. Constitutional:       General: She is not in acute distress. Appearance: Normal appearance. She is well-developed. She is obese. She is not ill-appearing, toxic-appearing or diaphoretic. HENT:      Head: Normocephalic. Eyes:      General: No scleral icterus. Extraocular Movements: Extraocular movements intact. Conjunctiva/sclera: Conjunctivae normal.      Pupils: Pupils are equal, round, and reactive to light. Neck:      Thyroid: No thyromegaly. Vascular: No carotid bruit or JVD. Comments: No carotid bruits  Cardiovascular:      Rate and Rhythm: Normal rate and regular rhythm. Pulses: Normal pulses.            Carotid pulses are 2+ on the right side and 2+ on the left side. Heart sounds: Normal heart sounds. No murmur heard. No friction rub. No gallop. Comments: No edema    Pulmonary:      Effort: Pulmonary effort is normal. No respiratory distress. Breath sounds: Normal breath sounds. No stridor. No wheezing, rhonchi or rales. Chest:      Chest wall: No tenderness. Musculoskeletal:      Cervical back: Normal range of motion and neck supple. Right lower leg: No edema. Left lower leg: No edema. Lymphadenopathy:      Cervical: No cervical adenopathy. Skin:     General: Skin is warm. Capillary Refill: Capillary refill takes less than 2 seconds. Coloration: Skin is not pale. Neurological:      General: No focal deficit present. Mental Status: She is alert and oriented to person, place, and time. Mental status is at baseline. Cranial Nerves: No cranial nerve deficit. Motor: No weakness or abnormal muscle tone. Deep Tendon Reflexes: Reflexes are normal and symmetric. Psychiatric:         Mood and Affect: Mood normal.         Thought Content: Thought content normal.         Assessment:       Diagnosis Orders   1. Benign essential HTN     2. Hyperlipidemia, unspecified hyperlipidemia type  Lipid Panel   3. Generalized anxiety disorder     4. Class 1 obesity due to excess calories without serious comorbidity with body mass index (BMI) of 31.0 to 31.9 in adult  phentermine (ADIPEX-P) 37.5 MG tablet   5. Screening for diabetes mellitus  Lipid Panel    Hemoglobin A1C           Plan:      1. Improved  Continue same medications no changes needed at this time     2. Check fu lipids    3. For now she is to continue meds   patient agrees and verbalizes understanding    4.  Diet/exercise counseling  Trial with Adipex  Secondary effects were discussed including: HTN, nervousness, tachycardia, insomnia, dizziness, dry mouth, constipation, rash which she report to me if those occur   patient agrees and verbalizes understanding    5.  Screening     Fu 1 mo           Sharilyn Sicard, MD

## 2022-04-09 LAB
ESTIMATED AVERAGE GLUCOSE: 125.5 MG/DL
HBA1C MFR BLD: 6 %

## 2022-04-30 DIAGNOSIS — M79.2 NEUROLOGICAL PAIN DISORDER: ICD-10-CM

## 2022-05-05 NOTE — TELEPHONE ENCOUNTER
Medication:   Requested Prescriptions     Pending Prescriptions Disp Refills    buPROPion (WELLBUTRIN XL) 300 MG extended release tablet [Pharmacy Med Name: BUPROPION HCL  MG TABLET] 30 tablet 8     Sig: TAKE 1 TABLET BY MOUTH EVERY DAY IN THE MORNING        Last Filled:      Patient Phone Number: [phone] (home)     Last appt: 4/8/2022   Next appt: 5/9/2022    Last OARRS:   RX Monitoring 8/6/2018   Attestation The Prescription Monitoring Report for this patient was reviewed today. Periodic Controlled Substance Monitoring Possible medication side effects, risk of tolerance/dependence & alternative treatments discussed. ;No signs of potential drug abuse or diversion identified.

## 2022-05-06 DIAGNOSIS — M79.2 NEUROLOGICAL PAIN DISORDER: ICD-10-CM

## 2022-05-06 RX ORDER — BUPROPION HYDROCHLORIDE 300 MG/1
TABLET ORAL
Qty: 30 TABLET | Refills: 8 | Status: SHIPPED | OUTPATIENT
Start: 2022-05-06

## 2022-05-06 RX ORDER — AMITRIPTYLINE HYDROCHLORIDE 25 MG/1
TABLET, FILM COATED ORAL
Qty: 90 TABLET | Refills: 1 | Status: SHIPPED | OUTPATIENT
Start: 2022-05-06 | End: 2022-05-31

## 2022-05-06 NOTE — TELEPHONE ENCOUNTER
----- Message from [place] [address] sent at 5/5/2022  3:30 PM EDT -----  Subject: Medication Problem    QUESTIONS  Name of Medication? amitriptyline (ELAVIL) 25 MG tablet  Patient-reported dosage and instructions? once day 3 pills at night  What question or problem do you have with the medication? Patient has been   informed by pharmacy that this medicine needs authorization for before   this med can be refilled. Patient was seen 3-4 weeks ago on 4/8. Can this   med be authorized so patient can pick it up? Preferred Pharmacy? [facility] [address] [phone]  Pharmacy phone number (if available)? [phone]  Additional Information for Provider?   ---------------------------------------------------------------------------  --------------  CALL BACK INFO  What is the best way for the office to contact you? OK to leave message on   voicemail  Preferred Call Back Phone Number? [phone]  ---------------------------------------------------------------------------  --------------  SCRIPT ANSWERS  Relationship to Patient?  Self

## 2022-05-06 NOTE — TELEPHONE ENCOUNTER
Medication:   Requested Prescriptions     Pending Prescriptions Disp Refills    amitriptyline (ELAVIL) 25 MG tablet 90 tablet 1            Patient Phone Number: [phone] (home)     Last appt: 4/8/2022   Next appt: 5/9/2022

## 2022-05-08 PROBLEM — Z13.1 SCREENING FOR DIABETES MELLITUS: Status: RESOLVED | Noted: 2022-04-08 | Resolved: 2022-05-08

## 2022-05-09 ENCOUNTER — OFFICE VISIT (OUTPATIENT)
Dept: FAMILY MEDICINE CLINIC | Age: 57
End: 2022-05-09
Payer: COMMERCIAL

## 2022-05-09 VITALS
WEIGHT: 183.3 LBS | SYSTOLIC BLOOD PRESSURE: 132 MMHG | HEART RATE: 75 BPM | OXYGEN SATURATION: 98 % | BODY MASS INDEX: 30.54 KG/M2 | RESPIRATION RATE: 16 BRPM | HEIGHT: 65 IN | TEMPERATURE: 98.1 F | DIASTOLIC BLOOD PRESSURE: 88 MMHG

## 2022-05-09 DIAGNOSIS — B35.1 TOENAIL FUNGUS: ICD-10-CM

## 2022-05-09 DIAGNOSIS — E66.09 CLASS 1 OBESITY DUE TO EXCESS CALORIES WITHOUT SERIOUS COMORBIDITY WITH BODY MASS INDEX (BMI) OF 30.0 TO 30.9 IN ADULT: Primary | ICD-10-CM

## 2022-05-09 PROCEDURE — 99213 OFFICE O/P EST LOW 20 MIN: CPT | Performed by: FAMILY MEDICINE

## 2022-05-09 RX ORDER — PHENTERMINE HYDROCHLORIDE 37.5 MG/1
37.5 TABLET ORAL
Qty: 30 TABLET | Refills: 0 | Status: SHIPPED | OUTPATIENT
Start: 2022-05-09 | End: 2022-06-08

## 2022-05-09 RX ORDER — OMEPRAZOLE 20 MG/1
CAPSULE, DELAYED RELEASE ORAL
Qty: 30 CAPSULE | Refills: 5 | Status: SHIPPED | OUTPATIENT
Start: 2022-05-09

## 2022-05-09 ASSESSMENT — PATIENT HEALTH QUESTIONNAIRE - PHQ9
8. MOVING OR SPEAKING SO SLOWLY THAT OTHER PEOPLE COULD HAVE NOTICED. OR THE OPPOSITE, BEING SO FIGETY OR RESTLESS THAT YOU HAVE BEEN MOVING AROUND A LOT MORE THAN USUAL: 0
7. TROUBLE CONCENTRATING ON THINGS, SUCH AS READING THE NEWSPAPER OR WATCHING TELEVISION: 0
5. POOR APPETITE OR OVEREATING: 0
3. TROUBLE FALLING OR STAYING ASLEEP: 1
SUM OF ALL RESPONSES TO PHQ QUESTIONS 1-9: 1
10. IF YOU CHECKED OFF ANY PROBLEMS, HOW DIFFICULT HAVE THESE PROBLEMS MADE IT FOR YOU TO DO YOUR WORK, TAKE CARE OF THINGS AT HOME, OR GET ALONG WITH OTHER PEOPLE: 0
2. FEELING DOWN, DEPRESSED OR HOPELESS: 0
SUM OF ALL RESPONSES TO PHQ9 QUESTIONS 1 & 2: 0
SUM OF ALL RESPONSES TO PHQ QUESTIONS 1-9: 1
SUM OF ALL RESPONSES TO PHQ QUESTIONS 1-9: 1
6. FEELING BAD ABOUT YOURSELF - OR THAT YOU ARE A FAILURE OR HAVE LET YOURSELF OR YOUR FAMILY DOWN: 0
9. THOUGHTS THAT YOU WOULD BE BETTER OFF DEAD, OR OF HURTING YOURSELF: 0
1. LITTLE INTEREST OR PLEASURE IN DOING THINGS: 0
SUM OF ALL RESPONSES TO PHQ QUESTIONS 1-9: 1
4. FEELING TIRED OR HAVING LITTLE ENERGY: 0

## 2022-05-09 ASSESSMENT — ENCOUNTER SYMPTOMS
CHEST TIGHTNESS: 0
CONSTIPATION: 0
VISUAL CHANGE: 0
SHORTNESS OF BREATH: 0

## 2022-05-09 NOTE — PROGRESS NOTES
Subjective:      Patient ID: Juanna Saint is a 56 y.o. female. Other  This is a chronic (fu obesity) problem. The current episode started more than 1 year ago. The problem occurs constantly. The problem has been gradually improving. Pertinent negatives include no chest pain, diaphoresis, fatigue, headaches, visual change or weakness. Nothing aggravates the symptoms. Treatments tried: exercising every day wt training and cardio, cut down fast food, does not drin soda, more veggies/fruit, adipex. The treatment provided moderate relief. R big toe nail is thick and yellow     Review of Systems   Constitutional: Negative for activity change, diaphoresis, fatigue and unexpected weight change. Respiratory: Negative for chest tightness and shortness of breath. Cardiovascular: Negative for chest pain, palpitations and leg swelling. Gastrointestinal: Negative for constipation. Neurological: Negative for weakness and headaches. Psychiatric/Behavioral: Negative for dysphoric mood and sleep disturbance. The patient is not nervous/anxious. Objective:  Blood pressure 132/88, pulse 75, temperature 98.1 °F (36.7 °C), temperature source Tympanic, resp. rate 16, height 5' 5\" (1.651 m), weight 183 lb 4.8 oz (83.1 kg), SpO2 98 %, not currently breastfeeding. Physical Exam  Vitals and nursing note reviewed. Constitutional:       General: She is not in acute distress. Appearance: Normal appearance. She is well-developed. She is obese. She is not ill-appearing, toxic-appearing or diaphoretic. HENT:      Head: Normocephalic. Eyes:      General: No scleral icterus. Extraocular Movements: Extraocular movements intact. Conjunctiva/sclera: Conjunctivae normal.      Pupils: Pupils are equal, round, and reactive to light. Cardiovascular:      Rate and Rhythm: Normal rate and regular rhythm. Pulses: Normal pulses. Heart sounds: No murmur heard. No friction rub. No gallop. Pulmonary:      Effort: Pulmonary effort is normal. No respiratory distress. Breath sounds: Normal breath sounds. No stridor. No wheezing, rhonchi or rales. Chest:      Chest wall: No tenderness. Musculoskeletal:         General: Normal range of motion. Cervical back: Normal range of motion. Right lower leg: No edema. Left lower leg: No edema. Skin:     General: Skin is warm. Coloration: Skin is not pale. Findings: No erythema. Neurological:      Mental Status: She is alert and oriented to person, place, and time. Cranial Nerves: No cranial nerve deficit. Coordination: Coordination normal.   Psychiatric:         Behavior: Behavior normal.         Assessment:       Diagnosis Orders   1. Class 1 obesity due to excess calories without serious comorbidity with body mass index (BMI) of 30.0 to 30.9 in adult  phentermine (ADIPEX-P) 37.5 MG tablet   2. Toenail fungus  ciclopirox (PENLAC) 8 % solution           Plan:      1. Lost 5 Lb, (has lost 10 Lb since January,)  Very compliant with life style changes,   No hx of sec effects with adipex. Continue same medications no changes needed at this time       Controlled Substances Monitoring: Attestation: The Prescription Monitoring Report for this patient was reviewed today. Alisia Weldon MD)  Documentation: No signs of potential drug abuse or diversion identified. Alisia Weldon MD)    2. Trial wit ciclopirox     Fu 4 weeks.        Juan David Pina MD

## 2022-05-09 NOTE — TELEPHONE ENCOUNTER
Medication:   Requested Prescriptions     Pending Prescriptions Disp Refills    omeprazole (PRILOSEC) 20 MG delayed release capsule [Pharmacy Med Name: OMEPRAZOLE DR 20 MG CAPSULE] 30 capsule 5     Sig: TAKE 1 CAPSULE BY MOUTH EVERY DAY          Patient Phone Number: [phone] (home)     Last appt: 5/9/2022   Next appt: 6/9/2022

## 2022-05-11 RX ORDER — AMITRIPTYLINE HYDROCHLORIDE 25 MG/1
TABLET, FILM COATED ORAL
Qty: 90 TABLET | Refills: 1 | Status: SHIPPED | OUTPATIENT
Start: 2022-05-11 | End: 2022-06-09 | Stop reason: CLARIF

## 2022-05-31 DIAGNOSIS — M79.2 NEUROLOGICAL PAIN DISORDER: ICD-10-CM

## 2022-05-31 RX ORDER — AMITRIPTYLINE HYDROCHLORIDE 25 MG/1
TABLET, FILM COATED ORAL
Qty: 90 TABLET | Refills: 1 | Status: SHIPPED | OUTPATIENT
Start: 2022-05-31 | End: 2022-06-24

## 2022-05-31 NOTE — TELEPHONE ENCOUNTER
Medication:   Requested Prescriptions     Pending Prescriptions Disp Refills    amitriptyline (ELAVIL) 25 mg tablet [Pharmacy Med Name: AMITRIPTYLINE HCL 25 MG TAB] 90 tablet 1     Sig: TAKE 3 TABLETS BY MOUTH AT BEDTIME        Last Filled:      Patient Phone Number: [phone] (home)     Last appt: 5/9/2022   Next appt: 6/9/2022

## 2022-06-09 ENCOUNTER — OFFICE VISIT (OUTPATIENT)
Dept: FAMILY MEDICINE CLINIC | Age: 57
End: 2022-06-09
Payer: COMMERCIAL

## 2022-06-09 VITALS
OXYGEN SATURATION: 98 % | BODY MASS INDEX: 29.37 KG/M2 | TEMPERATURE: 97.8 F | RESPIRATION RATE: 16 BRPM | WEIGHT: 176.3 LBS | HEIGHT: 65 IN | SYSTOLIC BLOOD PRESSURE: 116 MMHG | DIASTOLIC BLOOD PRESSURE: 72 MMHG | HEART RATE: 85 BPM

## 2022-06-09 DIAGNOSIS — F41.0 PANIC ATTACKS: ICD-10-CM

## 2022-06-09 DIAGNOSIS — E66.3 OVERWEIGHT (BMI 25.0-29.9): ICD-10-CM

## 2022-06-09 DIAGNOSIS — E66.09 CLASS 1 OBESITY DUE TO EXCESS CALORIES WITHOUT SERIOUS COMORBIDITY WITH BODY MASS INDEX (BMI) OF 31.0 TO 31.9 IN ADULT: ICD-10-CM

## 2022-06-09 DIAGNOSIS — R73.03 PRE-DIABETES: Primary | ICD-10-CM

## 2022-06-09 PROCEDURE — 99213 OFFICE O/P EST LOW 20 MIN: CPT | Performed by: FAMILY MEDICINE

## 2022-06-09 RX ORDER — CLONAZEPAM 1 MG/1
1 TABLET ORAL 2 TIMES DAILY PRN
Qty: 30 TABLET | Refills: 0 | Status: SHIPPED | OUTPATIENT
Start: 2022-06-09 | End: 2022-11-02

## 2022-06-09 RX ORDER — PHENTERMINE HYDROCHLORIDE 37.5 MG/1
37.5 TABLET ORAL
Qty: 30 TABLET | Refills: 0 | Status: SHIPPED | OUTPATIENT
Start: 2022-06-09 | End: 2022-07-09

## 2022-06-09 RX ORDER — ESTRADIOL 0.1 MG/G
CREAM VAGINAL
COMMUNITY
Start: 2022-05-19

## 2022-06-09 NOTE — PROGRESS NOTES
Subjective:      Patient ID: Margarita Martinez is a 56 y.o. female. HPI  Fu obesity:   Lost 7 Lb in a month,  Cut down portions,   Healthy snacks, and more proteins. Etoh: yes 2 beers a week and Friday   Exercise: walking one hour qod   Tx: adipex , no sec effects. Anxiety and panic attacks  Doing better, takes klonopin maybe once/week as needed     Pre dm: (previous lab screening )    denies polyuria/polydipsia/polyphagia/paresthesias/wt gain or loss,       Review of Systems    Objective:  Blood pressure 116/72, pulse 85, temperature 97.8 °F (36.6 °C), temperature source Tympanic, resp. rate 16, height 5' 5\" (1.651 m), weight 176 lb 4.8 oz (80 kg), SpO2 98 %, not currently breastfeeding. Physical Exam  Vitals and nursing note reviewed. Constitutional:       General: She is not in acute distress. Appearance: Normal appearance. She is obese. She is not ill-appearing, toxic-appearing or diaphoretic. HENT:      Head: Normocephalic and atraumatic. Eyes:      Extraocular Movements: Extraocular movements intact. Conjunctiva/sclera: Conjunctivae normal.      Pupils: Pupils are equal, round, and reactive to light. Cardiovascular:      Pulses: Normal pulses. Heart sounds: Normal heart sounds. No murmur heard. Pulmonary:      Effort: Pulmonary effort is normal. No respiratory distress. Breath sounds: Normal breath sounds. Musculoskeletal:      Cervical back: Normal range of motion and neck supple. Neurological:      General: No focal deficit present. Mental Status: She is alert and oriented to person, place, and time. Mental status is at baseline. Psychiatric:         Mood and Affect: Mood normal.         Behavior: Behavior normal.         Thought Content: Thought content normal.         Judgment: Judgment normal.         Assessment:       Diagnosis Orders   1. Pre-diabetes     2. Overweight (BMI 25.0-29.9)     3. Panic attacks  clonazePAM (KLONOPIN) 1 MG tablet   4. Class 1 obesity due to excess calories without serious comorbidity with body mass index (BMI) of 31.0 to 31.9 in adult  phentermine (ADIPEX-P) 37.5 MG tablet           Plan:      1. a1c 6.0  encourage continue life style changes    2. Improved has lost 12 Lb in the last 2 months with Adipex and life style changes  rx # 3 for Adipex  encourage her to cut down or stop etoh   patient agrees and verbalizes understanding    We may consider Contrave to continue her tx   Fu 4 weeks.           Eileen Bear MD

## 2022-06-23 DIAGNOSIS — M79.2 NEUROLOGICAL PAIN DISORDER: ICD-10-CM

## 2022-06-24 RX ORDER — AMITRIPTYLINE HYDROCHLORIDE 25 MG/1
TABLET, FILM COATED ORAL
Qty: 90 TABLET | Refills: 1 | Status: SHIPPED | OUTPATIENT
Start: 2022-06-24 | End: 2022-07-21

## 2022-06-24 NOTE — TELEPHONE ENCOUNTER
Medication:   Requested Prescriptions     Pending Prescriptions Disp Refills    amitriptyline (ELAVIL) 25 MG tablet [Pharmacy Med Name: AMITRIPTYLINE HCL 25 MG TAB] 90 tablet 1     Sig: TAKE 3 TABLETS BY MOUTH AT BEDTIME        Last Filled:      Patient Phone Number: [phone] (home)     Last appt: 6/9/2022   Next appt: 7/18/2022    Last OARRS:   RX Monitoring 8/6/2018   Attestation The Prescription Monitoring Report for this patient was reviewed today. Periodic Controlled Substance Monitoring Possible medication side effects, risk of tolerance/dependence & alternative treatments discussed. ;No signs of potential drug abuse or diversion identified.

## 2022-07-13 RX ORDER — ATENOLOL 25 MG/1
TABLET ORAL
Qty: 30 TABLET | Refills: 5 | Status: SHIPPED | OUTPATIENT
Start: 2022-07-13

## 2022-07-13 RX ORDER — ESCITALOPRAM OXALATE 20 MG/1
TABLET ORAL
Qty: 30 TABLET | Refills: 5 | Status: SHIPPED | OUTPATIENT
Start: 2022-07-13

## 2022-07-13 NOTE — TELEPHONE ENCOUNTER
Medication:   Requested Prescriptions     Pending Prescriptions Disp Refills    atenolol (TENORMIN) 25 MG tablet [Pharmacy Med Name: ATENOLOL 25 MG TABLET] 30 tablet 5     Sig: TAKE 1 TABLET BY MOUTH EVERY DAY        Last Filled:      Patient Phone Number: [phone] (home)     Last appt: 6/9/2022   Next appt: 7/18/2022    Last OARRS:   RX Monitoring 8/6/2018   Attestation The Prescription Monitoring Report for this patient was reviewed today. Periodic Controlled Substance Monitoring Possible medication side effects, risk of tolerance/dependence & alternative treatments discussed. ;No signs of potential drug abuse or diversion identified.

## 2022-07-18 ENCOUNTER — OFFICE VISIT (OUTPATIENT)
Dept: FAMILY MEDICINE CLINIC | Age: 57
End: 2022-07-18
Payer: COMMERCIAL

## 2022-07-18 VITALS
HEIGHT: 65 IN | SYSTOLIC BLOOD PRESSURE: 122 MMHG | WEIGHT: 171.3 LBS | HEART RATE: 75 BPM | OXYGEN SATURATION: 98 % | TEMPERATURE: 97.7 F | BODY MASS INDEX: 28.54 KG/M2 | RESPIRATION RATE: 16 BRPM | DIASTOLIC BLOOD PRESSURE: 74 MMHG

## 2022-07-18 DIAGNOSIS — E66.3 OVERWEIGHT (BMI 25.0-29.9): Primary | ICD-10-CM

## 2022-07-18 PROCEDURE — 99213 OFFICE O/P EST LOW 20 MIN: CPT | Performed by: FAMILY MEDICINE

## 2022-07-18 RX ORDER — NALTREXONE HYDROCHLORIDE 50 MG/1
50 TABLET, FILM COATED ORAL DAILY
Qty: 30 TABLET | Refills: 3 | Status: SHIPPED | OUTPATIENT
Start: 2022-07-18 | End: 2022-08-17

## 2022-07-21 DIAGNOSIS — M79.2 NEUROLOGICAL PAIN DISORDER: ICD-10-CM

## 2022-07-21 RX ORDER — AMITRIPTYLINE HYDROCHLORIDE 25 MG/1
TABLET, FILM COATED ORAL
Qty: 90 TABLET | Refills: 1 | Status: SHIPPED | OUTPATIENT
Start: 2022-07-21 | End: 2022-08-19

## 2022-07-21 NOTE — TELEPHONE ENCOUNTER
Medication:   Requested Prescriptions     Pending Prescriptions Disp Refills    amitriptyline (ELAVIL) 25 MG tablet [Pharmacy Med Name: AMITRIPTYLINE HCL 25 MG TAB] 90 tablet 1     Sig: TAKE 3 TABLETS BY MOUTH AT BEDTIME        Last Filled:      Patient Phone Number: [phone] (home)     Last appt: 7/18/2022   Next appt: 10/17/2022    Last OARRS:   RX Monitoring 8/6/2018   Attestation The Prescription Monitoring Report for this patient was reviewed today. Periodic Controlled Substance Monitoring Possible medication side effects, risk of tolerance/dependence & alternative treatments discussed. ;No signs of potential drug abuse or diversion identified.

## 2022-08-18 DIAGNOSIS — M79.2 NEUROLOGICAL PAIN DISORDER: ICD-10-CM

## 2022-08-18 NOTE — TELEPHONE ENCOUNTER
Medication:   Requested Prescriptions     Pending Prescriptions Disp Refills    amitriptyline (ELAVIL) 25 MG tablet [Pharmacy Med Name: AMITRIPTYLINE HCL 25 MG TAB] 90 tablet 1     Sig: TAKE 3 TABLETS BY MOUTH AT BEDTIME        Last Filled:      Patient Phone Number: [phone] (home)     Last appt: 7/18/2022   Next appt: 10/17/2022    Last OARRS:   RX Monitoring 8/6/2018   Attestation The Prescription Monitoring Report for this patient was reviewed today. Periodic Controlled Substance Monitoring Possible medication side effects, risk of tolerance/dependence & alternative treatments discussed. ;No signs of potential drug abuse or diversion identified.

## 2022-08-19 RX ORDER — AMITRIPTYLINE HYDROCHLORIDE 25 MG/1
TABLET, FILM COATED ORAL
Qty: 90 TABLET | Refills: 1 | Status: SHIPPED | OUTPATIENT
Start: 2022-08-19 | End: 2022-09-15

## 2022-09-12 ENCOUNTER — OFFICE VISIT (OUTPATIENT)
Dept: SURGERY | Age: 57
End: 2022-09-12
Payer: COMMERCIAL

## 2022-09-12 VITALS
HEIGHT: 65 IN | SYSTOLIC BLOOD PRESSURE: 135 MMHG | BODY MASS INDEX: 29.49 KG/M2 | WEIGHT: 177 LBS | HEART RATE: 80 BPM | TEMPERATURE: 98.2 F | DIASTOLIC BLOOD PRESSURE: 80 MMHG | OXYGEN SATURATION: 97 %

## 2022-09-12 DIAGNOSIS — L98.7 EXCESS SKIN OF ABDOMEN: ICD-10-CM

## 2022-09-12 DIAGNOSIS — C50.911 MALIGNANT NEOPLASM OF RIGHT FEMALE BREAST, UNSPECIFIED ESTROGEN RECEPTOR STATUS, UNSPECIFIED SITE OF BREAST (HCC): Primary | ICD-10-CM

## 2022-09-12 PROCEDURE — 99214 OFFICE O/P EST MOD 30 MIN: CPT | Performed by: SURGERY

## 2022-09-12 NOTE — PROGRESS NOTES
Yes    Drug use: Not on file    Sexual activity: Yes     Partners: Male   Other Topics Concern    Not on file   Social History Narrative    Not on file     Social Determinants of Health     Financial Resource Strain: Low Risk     Difficulty of Paying Living Expenses: Not hard at all   Food Insecurity: No Food Insecurity    Worried About Running Out of Food in the Last Year: Never true    Ran Out of Food in the Last Year: Never true   Transportation Needs: Not on file   Physical Activity: Not on file   Stress: Not on file   Social Connections: Not on file   Intimate Partner Violence: Not on file   Housing Stability: Not on file     FHx: Multiple members with breast CA    Meds:   Current Outpatient Medications   Medication Sig Dispense Refill    amitriptyline (ELAVIL) 25 MG tablet TAKE 3 TABLETS BY MOUTH AT BEDTIME 90 tablet 1    atenolol (TENORMIN) 25 MG tablet TAKE 1 TABLET BY MOUTH EVERY DAY 30 tablet 5    escitalopram (LEXAPRO) 20 MG tablet TAKE 1 TABLET BY MOUTH EVERY DAY 30 tablet 5    estradiol (ESTRACE) 0.1 MG/GM vaginal cream APPLY 0.5 G BY VAGINAL ROUTE 2 TIMES A WEEK FOR 90 DAYS      clonazePAM (KLONOPIN) 1 MG tablet Take 1 tablet by mouth 2 times daily as needed for Anxiety for up to 30 days. 30 tablet 0    ciclopirox (PENLAC) 8 % solution Apply on affected nail  nightly.  6.6 mL 1    omeprazole (PRILOSEC) 20 MG delayed release capsule TAKE 1 CAPSULE BY MOUTH EVERY DAY 30 capsule 5    buPROPion (WELLBUTRIN XL) 300 MG extended release tablet TAKE 1 TABLET BY MOUTH EVERY DAY IN THE MORNING 30 tablet 8    levothyroxine (SYNTHROID) 200 MCG tablet TAKE 1 TABLET BY MOUTH EVERY DAY 30 tablet 5    baclofen (LIORESAL) 10 MG tablet TAKE 1 TABLET (10 MG TOTAL) BY MOUTH 3 TIMES A DAY. 1 BY MOUTH TWICE A DAY AS NEEDED HEADACHE      EMGALITY 120 MG/ML SOAJ       butalbital-acetaminophen-caffeine (FIORICET, ESGIC) -40 MG per tablet Take 1 tablet by mouth every 6 hours as needed for Headaches 30 tablet 1 SUMAtriptan (IMITREX) 100 MG tablet TAKE ONE TABLET BY MOUTH AT ONSET OF HEADACHE. MAY REPEAT IN 2 HOURS MAX OF 2 TABS PER 24 HOURS 9 tablet 6    propranolol (INDERAL) 10 MG tablet Take 1 tablet by mouth 2 times daily 90 tablet 1    primidone (MYSOLINE) 50 MG tablet Take 100 mg by mouth nightly. No current facility-administered medications for this visit. ROS   Constitutional: Negative for chills and fever. HENT: Negative for congestion, facial swelling, and voice change. Eyes: Negative for photophobia and visual disturbance. Respiratory: Negative for apnea, cough, chest tightness and shortness of breath. Cardiovascular: Negative for chest pain and palpitations. Gastrointestinal: Negative for dysphagia and early satiety. Genitourinary: Negative for difficulty urinating, dysuria, flank pain, frequency and hematuria. Musculoskeletal: Negative for new gait problem, joint swelling and myalgias. Skin: Negative for color change, pallor and rash. Endocrine: negative for tremors, temperature intolerance or polydipsia. Allergic/Immunologic: Negative for new environmental or food allergies. Neurological: Negative for dizziness, seizures, speech difficulty, numbness. Hematological: Negative for adenopathy. Psychiatric/Behavioral: Negative for agitation and confusion. EXAM    /80   Pulse 80   Temp 98.2 °F (36.8 °C)   Ht 5' 5\" (1.651 m)   Wt 177 lb (80.3 kg)   LMP 02/17/2015 Comment: Spotting   SpO2 97%   BMI 29.45 kg/m²     GEN: NAD   CVS: RRR  PULM: No respiratory distress  BREAST: Incision healing well Good contour / nipple viable Improved contour on the right breast, though still with some deficiency  ABD: Excess skin and volume noted with lipodystrophy in the midline    IMP: 56 y. o.female s/p implant reconstruction  PLAN: Would benefit from an additional round of fat grafting to the breast for improvement in volume as well as abdominoplasty with flank liposuction for improvement in abdominal contour. Will be able to perform this concomitantly and the patient understands that the latter is cosmetic. She additionally understands that she may require future staged liposuction to the midline. Will submit to insurance and provide cosmetic pricing. Then will schedule. R/B/A/O/P discussed in detail with the patient who agrees to proceed.      Chalino Jefferson MD  [address] [place] [address] [address] Reconstructive Surgery  [phone]  09/12/22

## 2022-09-12 NOTE — LETTER
Surgery Schedule Request Form  [facility] [employer]  [address] [place], [place] [address]clyde. Maldonado, [address]  DATE OF SURGERY: 2022      TIME OF SURGERY: 7:30 am      Confirmation#:__________________        Surgeon Name: Adri Saldivar MD    Phone: [phone]     Fax: [phone]  Procedure Name: 1) Right breast fat grafting     2) Abdominoplasty with rectus plication  CPT CODES (required for scheduling): 67 818 65 32, 06466, MISCABD  DIAGNOSIS: C50.911, Z98.890, L98.7   Breast Cancer, ,Status Post Breast Reconstruction, Excess Skin Of Abdomen    LENGTH OF PROCEDURE: 3 hours (1.5 hours cosmetic)  Patient Status: 23 hour observation    PATIENT NAME: [address] [place] [address] Dr: 1965  SEX: female   SS #:   PHONE: [phone] (home)     Labs Needed:   CBC ___  PT/PTT___ INR ____  CMP ___ EKG ___   Urine Hcg ___            Pre-Op to be done by: PCP  Cardiac Clearance Done by: per PCP    Pt Position:  supine  Patient to meet with Anesthesiology prior to surgery: no     Medications to be stopped 5 days before surgery: anticoagulation     Ancef 2 gm IV OCTOR (NOTE:  If patient weight is > 120 kg, Administer 3 gm)  Other Orders: Transexemic acid 1g IV OCTOR; No Decadron; SA    INSURANCE: [email or web address]                                               SUBSCRIBER NAME: Shannon SMITHJose R Powell   MEMBER ID: [id number]                                           AUTHORIZATION #: [id number]    PCP: Pam Ceron. Dolores Anders MD                                       ANESTHESIA:  Bola Martin MD                             FAX TO: [phone]   QUESTIONS? CALL: Malcolm Hinojosa   Fax   [phone]            Date Of Procedure: 2022    PATIENT:       Abdulaziz Caldera                    :  1965        Allergies   Allergen Reactions    Heparin      Hives        No.   PHYSICIAN ORDERS   HUC/  RN      ORDERS WITH CHECK BOXES MUST BE SELECTED. ALL OTHER ORDERS WILL BE AUTOMATICALLY INITIATED. Date / Time of Order:   9/27/22   1:54 PM          Procedure:  Right breast fat grafting     2) Abdominoplasty with rectus plication         1. Cefazolin (Ancef) 2 gm IV OCTOR   ** NOTE:  If patient weight is > 120 kg, Administer 3 gm         2. ** If PCN allergy, then Clindamycin 600mg IV OCTOR.   ** If prior history of MRSA, Vancomycin 1g IV OCTOR (please check with renal function prior to administration)       3.  Other orders: Transexemic acid 1g IV OCTOR; No Decadron; SA     Physician / Surgeon:  Lawrence Carlos MD  Office Ph:  [phone]       Physician Signature:     9/07

## 2022-09-13 ENCOUNTER — TELEPHONE (OUTPATIENT)
Dept: SURGERY | Age: 57
End: 2022-09-13

## 2022-09-13 NOTE — TELEPHONE ENCOUNTER
The patient was in the office to see Dr. Lamar Mcdonald yesterday. PLAN: Would benefit from an additional round of fat grafting to the breast for improvement in volume as well as abdominoplasty with flank liposuction for improvement in abdominal contour. Will be able to perform this concomitantly and the patient understands that the latter is cosmetic. She additionally understands that she may require future staged liposuction to the midline. Will submit to insurance and provide cosmetic pricing. Then will schedule. I received a surgery letter. I spoke with Frederic Pimentel at [facility] ([phone]) to see if CPT Code 67 [phone] 65 32 or 94964 requires pre certification. The codes do not require pre certification. Pre Determination is not recommended for CPT Code 48874, but it is recommended for CPT Code 19232, which I started during our call. I will fax clinicals to [phone]. I will scan the information that I faxed and the fax success into Epic under the media tab. Pending Case # [id number]    Date Range 9- to 12-     I will need to provide cosmetic pricing for MISCABD. I lmom for the patient at the home number listed to provide an insurance update. I will leave this phone note open.

## 2022-09-13 NOTE — TELEPHONE ENCOUNTER
I returned the patients call mentioned below. All of her questions were answered. I will leave this phone note open.

## 2022-09-15 DIAGNOSIS — M79.2 NEUROLOGICAL PAIN DISORDER: ICD-10-CM

## 2022-09-15 RX ORDER — AMITRIPTYLINE HYDROCHLORIDE 25 MG/1
TABLET, FILM COATED ORAL
Qty: 90 TABLET | Refills: 1 | Status: SHIPPED | OUTPATIENT
Start: 2022-09-15 | End: 2022-10-17

## 2022-09-15 NOTE — TELEPHONE ENCOUNTER
Medication:   Requested Prescriptions     Pending Prescriptions Disp Refills    amitriptyline (ELAVIL) 25 MG tablet [Pharmacy Med Name: AMITRIPTYLINE HCL 25 MG TAB] 90 tablet 1     Sig: TAKE 3 TABLETS BY MOUTH AT BEDTIME        Last Filled:      Patient Phone Number: [phone] (home)     Last appt: 7/18/2022   Next appt: 10/17/2022    Last OARRS:   RX Monitoring 8/6/2018   Attestation The Prescription Monitoring Report for this patient was reviewed today. Periodic Controlled Substance Monitoring Possible medication side effects, risk of tolerance/dependence & alternative treatments discussed. ;No signs of potential drug abuse or diversion identified.

## 2022-09-27 NOTE — TELEPHONE ENCOUNTER
I spoke with Raymond Huerta at [facility] ([phone]) to follow up on Pending Case # [id number]. APPROVED  CPT Code U4706938, 07141  Date Range 9- to 12-  Authorization # [id number]    I spoke with the patient at the home number listed. The patient was provided cosmetic pricing of $6970.00 for MISCABD. The patient is in agreement to move forward with the cosmetic procedure. The patient is now scheduled for surgery with  on     The patient is aware of H&P. The patient is scheduled for her post op appointment 12-5-2022. I will submit the surgery letter today. I will mail the surgery information, instructions and the cosmetic payment quote to the patient today. I will close this phone note.

## 2022-10-07 DIAGNOSIS — E03.9 HYPOTHYROIDISM, UNSPECIFIED TYPE: ICD-10-CM

## 2022-10-10 RX ORDER — LEVOTHYROXINE SODIUM 0.2 MG/1
TABLET ORAL
Qty: 30 TABLET | Refills: 11 | Status: SHIPPED | OUTPATIENT
Start: 2022-10-10

## 2022-10-15 DIAGNOSIS — M79.2 NEUROLOGICAL PAIN DISORDER: ICD-10-CM

## 2022-10-17 RX ORDER — AMITRIPTYLINE HYDROCHLORIDE 25 MG/1
TABLET, FILM COATED ORAL
Qty: 90 TABLET | Refills: 1 | Status: SHIPPED | OUTPATIENT
Start: 2022-10-17

## 2022-10-17 NOTE — TELEPHONE ENCOUNTER
Medication:   Requested Prescriptions     Pending Prescriptions Disp Refills    amitriptyline (ELAVIL) 25 MG tablet [Pharmacy Med Name: AMITRIPTYLINE HCL 25 MG TAB] 90 tablet 1     Sig: TAKE 3 TABLETS BY MOUTH AT BEDTIME        Last Filled:      Patient Phone Number: [phone] (home)     Last appt: 7/18/2022   Next appt: 11/2/2022    Last OARRS:   RX Monitoring 8/6/2018   Attestation The Prescription Monitoring Report for this patient was reviewed today. Periodic Controlled Substance Monitoring Possible medication side effects, risk of tolerance/dependence & alternative treatments discussed. ;No signs of potential drug abuse or diversion identified.

## 2022-11-01 ENCOUNTER — TELEPHONE (OUTPATIENT)
Dept: FAMILY MEDICINE CLINIC | Age: 57
End: 2022-11-01

## 2022-11-01 DIAGNOSIS — E03.9 HYPOTHYROIDISM, UNSPECIFIED TYPE: ICD-10-CM

## 2022-11-01 DIAGNOSIS — Z00.00 WELL ADULT EXAM: ICD-10-CM

## 2022-11-01 DIAGNOSIS — Z00.00 WELL ADULT EXAM: Primary | ICD-10-CM

## 2022-11-01 PROCEDURE — 36415 COLL VENOUS BLD VENIPUNCTURE: CPT | Performed by: FAMILY MEDICINE

## 2022-11-02 ENCOUNTER — OFFICE VISIT (OUTPATIENT)
Dept: FAMILY MEDICINE CLINIC | Age: 57
End: 2022-11-02
Payer: COMMERCIAL

## 2022-11-02 VITALS
WEIGHT: 176.3 LBS | OXYGEN SATURATION: 98 % | HEIGHT: 65 IN | RESPIRATION RATE: 16 BRPM | TEMPERATURE: 97.3 F | SYSTOLIC BLOOD PRESSURE: 134 MMHG | HEART RATE: 66 BPM | BODY MASS INDEX: 29.37 KG/M2 | DIASTOLIC BLOOD PRESSURE: 88 MMHG

## 2022-11-02 DIAGNOSIS — E03.9 HYPOTHYROIDISM, UNSPECIFIED TYPE: ICD-10-CM

## 2022-11-02 DIAGNOSIS — Z01.818 PRE-OP EXAM: Primary | ICD-10-CM

## 2022-11-02 DIAGNOSIS — I10 BENIGN ESSENTIAL HTN: ICD-10-CM

## 2022-11-02 LAB
A/G RATIO: 1.8 (ref 1.1–2.2)
ALBUMIN SERPL-MCNC: 4.9 G/DL (ref 3.4–5)
ALP BLD-CCNC: 68 U/L (ref 40–129)
ALT SERPL-CCNC: 21 U/L (ref 10–40)
ANION GAP SERPL CALCULATED.3IONS-SCNC: 15 MMOL/L (ref 3–16)
AST SERPL-CCNC: 17 U/L (ref 15–37)
BASOPHILS ABSOLUTE: 0.1 K/UL (ref 0–0.2)
BASOPHILS RELATIVE PERCENT: 0.8 %
BILIRUB SERPL-MCNC: <0.2 MG/DL (ref 0–1)
BUN BLDV-MCNC: 14 MG/DL (ref 7–20)
CALCIUM SERPL-MCNC: 9.9 MG/DL (ref 8.3–10.6)
CHLORIDE BLD-SCNC: 101 MMOL/L (ref 99–110)
CHOLESTEROL, TOTAL: 235 MG/DL (ref 0–199)
CO2: 25 MMOL/L (ref 21–32)
CREAT SERPL-MCNC: 1 MG/DL (ref 0.6–1.1)
EOSINOPHILS ABSOLUTE: 0.2 K/UL (ref 0–0.6)
EOSINOPHILS RELATIVE PERCENT: 3.6 %
GFR SERPL CREATININE-BSD FRML MDRD: >60 ML/MIN/{1.73_M2}
GLUCOSE BLD-MCNC: 125 MG/DL (ref 70–99)
HCT VFR BLD CALC: 49.2 % (ref 36–48)
HDLC SERPL-MCNC: 77 MG/DL (ref 40–60)
HEMOGLOBIN: 15.7 G/DL (ref 12–16)
LDL CHOLESTEROL CALCULATED: 133 MG/DL
LYMPHOCYTES ABSOLUTE: 2 K/UL (ref 1–5.1)
LYMPHOCYTES RELATIVE PERCENT: 29.5 %
MCH RBC QN AUTO: 27.7 PG (ref 26–34)
MCHC RBC AUTO-ENTMCNC: 31.9 G/DL (ref 31–36)
MCV RBC AUTO: 86.8 FL (ref 80–100)
MONOCYTES ABSOLUTE: 0.6 K/UL (ref 0–1.3)
MONOCYTES RELATIVE PERCENT: 8.6 %
NEUTROPHILS ABSOLUTE: 4 K/UL (ref 1.7–7.7)
NEUTROPHILS RELATIVE PERCENT: 57.5 %
PDW BLD-RTO: 15.1 % (ref 12.4–15.4)
PLATELET # BLD: 229 K/UL (ref 135–450)
PMV BLD AUTO: 8.3 FL (ref 5–10.5)
POTASSIUM SERPL-SCNC: 5 MMOL/L (ref 3.5–5.1)
RBC # BLD: 5.67 M/UL (ref 4–5.2)
REASON FOR REJECTION: NORMAL
REJECTED TEST: NORMAL
SODIUM BLD-SCNC: 141 MMOL/L (ref 136–145)
TOTAL PROTEIN: 7.7 G/DL (ref 6.4–8.2)
TRIGL SERPL-MCNC: 125 MG/DL (ref 0–150)
TSH SERPL DL<=0.05 MIU/L-ACNC: 0.16 UIU/ML (ref 0.27–4.2)
VLDLC SERPL CALC-MCNC: 25 MG/DL
WBC # BLD: 6.9 K/UL (ref 4–11)

## 2022-11-02 PROCEDURE — 3078F DIAST BP <80 MM HG: CPT | Performed by: FAMILY MEDICINE

## 2022-11-02 PROCEDURE — 99214 OFFICE O/P EST MOD 30 MIN: CPT | Performed by: FAMILY MEDICINE

## 2022-11-02 PROCEDURE — 93000 ELECTROCARDIOGRAM COMPLETE: CPT | Performed by: FAMILY MEDICINE

## 2022-11-02 PROCEDURE — 3074F SYST BP LT 130 MM HG: CPT | Performed by: FAMILY MEDICINE

## 2022-11-02 NOTE — PROGRESS NOTES
Preoperative Consultation      Beto Garcia  YOB: 1965    Date of Service:  11/2/2022    Vitals:    11/02/22 0851   BP: 134/88   Pulse: 66   Resp: 16   Temp: 97.3 °F (36.3 °C)   TempSrc: Temporal   SpO2: 98%   Weight: 176 lb 4.8 oz (80 kg)   Height: 5' 5\" (1.651 m)      Wt Readings from Last 2 Encounters:   11/02/22 176 lb 4.8 oz (80 kg)   09/12/22 177 lb (80.3 kg)     BP Readings from Last 3 Encounters:   11/02/22 134/88   09/12/22 135/80   07/18/22 122/74        Chief Complaint   Patient presents with    Blood Work    Pre-op Exam     Tummy tuck scheduled on 11/28/2022 with Dr. Yumiko Modi      Allergies   Allergen Reactions    Heparin      Hives      Outpatient Medications Marked as Taking for the 11/2/22 encounter (Office Visit) with Hany Gaspar MD   Medication Sig Dispense Refill    amitriptyline (ELAVIL) 25 MG tablet TAKE 3 TABLETS BY MOUTH AT BEDTIME 90 tablet 1    levothyroxine (SYNTHROID) 200 MCG tablet TAKE 1 TABLET BY MOUTH EVERY DAY 30 tablet 11    atenolol (TENORMIN) 25 MG tablet TAKE 1 TABLET BY MOUTH EVERY DAY 30 tablet 5    escitalopram (LEXAPRO) 20 MG tablet TAKE 1 TABLET BY MOUTH EVERY DAY 30 tablet 5    estradiol (ESTRACE) 0.1 MG/GM vaginal cream APPLY 0.5 G BY VAGINAL ROUTE 2 TIMES A WEEK FOR 90 DAYS      clonazePAM (KLONOPIN) 1 MG tablet Take 1 tablet by mouth 2 times daily as needed for Anxiety for up to 30 days. 30 tablet 0    ciclopirox (PENLAC) 8 % solution Apply on affected nail  nightly.  6.6 mL 1    omeprazole (PRILOSEC) 20 MG delayed release capsule TAKE 1 CAPSULE BY MOUTH EVERY DAY 30 capsule 5    buPROPion (WELLBUTRIN XL) 300 MG extended release tablet TAKE 1 TABLET BY MOUTH EVERY DAY IN THE MORNING 30 tablet 8    baclofen (LIORESAL) 10 MG tablet TAKE 1 TABLET (10 MG TOTAL) BY MOUTH 3 TIMES A DAY. 1 BY MOUTH TWICE A DAY AS NEEDED HEADACHE      EMGALITY 120 MG/ML SOAJ       butalbital-acetaminophen-caffeine (FIORICET, ESGIC) -40 MG per tablet Take 1 tablet by mouth every 6 hours as needed for Headaches 30 tablet 1    SUMAtriptan (IMITREX) 100 MG tablet TAKE ONE TABLET BY MOUTH AT ONSET OF HEADACHE. MAY REPEAT IN 2 HOURS MAX OF 2 TABS PER 24 HOURS 9 tablet 6    propranolol (INDERAL) 10 MG tablet Take 1 tablet by mouth 2 times daily 90 tablet 1    primidone (MYSOLINE) 50 MG tablet Take 100 mg by mouth nightly.          This patient presents to the office today for a preoperative consultation at the request of surgeon, Dr. Robert Ballard , who plans on performing abdominoplasty and fat graft  to RIGHT  breast  on November 28 at [place] [address].        Planned anesthesia: General   Known anesthesia problems: None   Bleeding risk: No recent or remote history of abnormal bleeding  Personal or FH of DVT/PE: No    Patient objection to receiving blood products: No        Past Medical History:   Diagnosis Date    Anxiety     Breast cancer ([address]     at 47    Cervical disc disorder at C6-C7 level with myelopathy 2012    Cervical spondylarthritis     Class 1 obesity due to excess calories without serious comorbidity with body mass index (BMI) of 30.0 to 30.9 in adult 08/06/2018    COVID-19 01/2021    Depression     Hyperlipidemia 10/08/2021    Hypothyroidism     Migraine     hx depakote, nadalol, topamax (stomach upset)    Neuropathy     Reactive hypertension 12/22/2021    TMJ (temporomandibular joint syndrome)     TMJ disease     Urinary incontinence 08/24/2016     Past Surgical History:   Procedure Laterality Date    BREAST LUMPECTOMY      right at  48 yo     BREAST REDUCTION SURGERY Left 12/30/2021    LEFT BREAST REDUCTION/ performed by Quinton Valdez MD at [facility] 40 Right 12/30/2021    RIGHT BREAST FAT GRAFTING performed by Quinton Valdez MD at [facility] 38 Right 2013    SPINAL FUSION  2013    TONSILLECTOMY       Family History   Problem Relation Age of Onset    Other Brother         hyperthyroidism    High Blood Pressure Brother     Cancer Mother breast    Mental Illness Mother     Other Mother         hypothyroidism     Social History     Socioeconomic History    Marital status:      Spouse name: Warren    Number of children: 4    Years of education: Not on file    Highest education level: Not on file   Occupational History    Occupation: part time warehouse   Tobacco Use    Smoking status: Never    Smokeless tobacco: Never   Substance and Sexual Activity    Alcohol use: Yes    Drug use: Not on file    Sexual activity: Yes     Partners: Male   Other Topics Concern     Review of Systems  A comprehensive review of systems was negative except for what was noted in the HPI. Physical Exam   Blood pressure 134/88, pulse 66, temperature 97.3 °F (36.3 °C), temperature source Temporal, resp. rate 16, height 5' 5\" (1.651 m), weight 176 lb 4.8 oz (80 kg), last menstrual period 02/17/2015, SpO2 98 %, not currently breastfeeding. Constitutional: She is oriented to person, place, and time. She appears well-developed and well-nourished. No distress. HENT:   Head: Normocephalic and atraumatic. Mouth/Throat: Uvula is midline, oropharynx is clear and moist and mucous membranes are normal.   Eyes: Conjunctivae and EOM are normal. Pupils are equal, round, and reactive to light. Neck: Trachea normal and normal range of motion. Neck supple. No JVD present. Carotid bruit is not present. No mass and no thyromegaly present. Cardiovascular: Normal rate, regular rhythm, normal heart sounds and intact distal pulses. Exam reveals no gallop and no friction rub. No murmur heard. Pulmonary/Chest: Effort normal and breath sounds normal. No respiratory distress. She has no wheezes. She has no rales. Abdominal: Soft. Normal aorta and bowel sounds are normal. She exhibits no distension and no mass. There is no hepatosplenomegaly. No tenderness. Musculoskeletal: She exhibits no edema and no tenderness.    Neurological: She is alert and oriented to person, place, and time. She has normal strength. No cranial nerve deficit or sensory deficit. Coordination and gait normal.   Skin: Skin is warm and dry. No rash noted. No erythema. Psychiatric: She has a normal mood and affect. Her behavior is normal.       EKG /blood work reviewed and interpreted by me for today's visit . EKG Interpretation:  normal EKG, normal sinus rhythm. Lab Review not applicable    Latest Reference Range & Units 11/1/22 15:36   Sodium 136 - 145 mmol/L 141   Potassium 3.5 - 5.1 mmol/L 5.0   Chloride 99 - 110 mmol/L 101   CO2 21 - 32 mmol/L 25   BUN,BUNPL 7 - 20 mg/dL 14   Creatinine 0.6 - 1.1 mg/dL 1.0   Anion Gap 3 - 16  15   Est, Glom Filt Rate >60  >60   Glucose, Random 70 - 99 mg/dL 125 (H) non fasting    CALCIUM, SERUM, 769845 8.3 - 10.6 mg/dL 9.9   Total Protein 6.4 - 8.2 g/dL 7.7   (H): Data is abnormally high   Latest Reference Range & Units 11/1/22 15:36   Albumin 3.4 - 5.0 g/dL 4.9   Albumin/Globulin Ratio 1.1 - 2.2  1.8   Alk Phos 40 - 129 U/L 68   ALT 10 - 40 U/L 21   AST 15 - 37 U/L 17   Bilirubin 0.0 - 1.0 mg/dL <0.2   Total Protein 6.4 - 8.2 g/dL 7.7   Triglycerides 0 - 150 mg/dL 125      Assessment:       56 y.o. patient with planned surgery as above. Known risk factors for perioperative complications: Hypertension, hypothyroid   Current medications which may produce withdrawal symptoms if withheld perioperatively: none      Plan:     1. Preoperative workup as follows: hemoglobin, hematocrit, coagulation studies  2. Change in medication regimen before surgery: Take synthroid, prilosec and Klonopin  on morning of surgery with sip of water, and hold all other medications until after surgery  3.  Prophylaxis for cardiac events with perioperative beta-blockers: Not indicated  ACC/AHA indications for pre-operative beta-blocker use:    Vascular surgery with history of postitive stress test  Intermediate or high risk surgery with history of CAD   Intermediate or high risk surgery with multiple clinical predictors of CAD- 2 of the following: history of compensated or prior heart failure, history of cerebrovascular disease, DM, or renal insufficiency    Routine administration of higher-dose, long-acting metoprolol in beta-blocker-naïve patients on the day of surgery, and in the absence of dose titration is associated with an overall increase in mortality. Beta-blockers should be started days to weeks prior to surgery and titrated to pulse < 70.  4. Deep vein thrombosis prophylaxis: regimen to be chosen by surgical team  5.  No contraindications to planned surgery

## 2022-11-03 DIAGNOSIS — Z01.818 PREOP EXAMINATION: ICD-10-CM

## 2022-11-03 DIAGNOSIS — Z01.818 PREOP EXAMINATION: Primary | ICD-10-CM

## 2022-11-03 LAB
APTT: 30.7 SEC (ref 23–34.3)
INR BLD: 0.97 (ref 0.87–1.14)
PROTHROMBIN TIME: 12.8 SEC (ref 11.7–14.5)

## 2022-11-03 PROCEDURE — 36415 COLL VENOUS BLD VENIPUNCTURE: CPT | Performed by: FAMILY MEDICINE

## 2022-11-07 RX ORDER — OMEPRAZOLE 20 MG/1
CAPSULE, DELAYED RELEASE ORAL
Qty: 30 CAPSULE | Refills: 5 | Status: SHIPPED | OUTPATIENT
Start: 2022-11-07

## 2022-11-07 NOTE — TELEPHONE ENCOUNTER
Medication:   Requested Prescriptions     Pending Prescriptions Disp Refills    omeprazole (PRILOSEC) 20 MG delayed release capsule [Pharmacy Med Name: OMEPRAZOLE DR 20 MG CAPSULE] 30 capsule 5     Sig: TAKE 1 CAPSULE BY MOUTH EVERY DAY        Last Filled:      Patient Phone Number: [phone] (home)     Last appt: 11/2/2022   Next appt: Visit date not found    Last OARRS:   RX Monitoring 8/6/2018   Attestation The Prescription Monitoring Report for this patient was reviewed today. Periodic Controlled Substance Monitoring Possible medication side effects, risk of tolerance/dependence & alternative treatments discussed. ;No signs of potential drug abuse or diversion identified.

## 2022-11-12 DIAGNOSIS — M79.2 NEUROLOGICAL PAIN DISORDER: ICD-10-CM

## 2022-11-14 RX ORDER — AMITRIPTYLINE HYDROCHLORIDE 25 MG/1
TABLET, FILM COATED ORAL
Qty: 90 TABLET | Refills: 1 | Status: SHIPPED | OUTPATIENT
Start: 2022-11-14

## 2022-11-14 NOTE — TELEPHONE ENCOUNTER
Medication:   Requested Prescriptions     Pending Prescriptions Disp Refills    amitriptyline (ELAVIL) 25 MG tablet [Pharmacy Med Name: AMITRIPTYLINE HCL 25 MG TAB] 90 tablet 1     Sig: TAKE 3 TABLETS BY MOUTH AT BEDTIME        Last Filled:      Patient Phone Number: [phone] (home)     Last appt: 11/2/2022   Next appt: Visit date not found    Last OARRS:   RX Monitoring 8/6/2018   Attestation The Prescription Monitoring Report for this patient was reviewed today. Periodic Controlled Substance Monitoring Possible medication side effects, risk of tolerance/dependence & alternative treatments discussed. ;No signs of potential drug abuse or diversion identified.

## 2022-11-22 RX ORDER — NALTREXONE HYDROCHLORIDE 50 MG/1
TABLET, FILM COATED ORAL DAILY
COMMUNITY
Start: 2022-08-01

## 2022-11-22 NOTE — PROGRESS NOTES
[facility] PRE-SURGICAL TESTING INSTRUCTIONS                      PRIOR TO PROCEDURE DATE:    1. PLEASE FOLLOW ANY INSTRUCTIONS GIVEN TO YOU PER YOUR SURGEON. 2. Arrange for someone to drive you home and be with you for the first 24 hours after discharge for your safety after your procedure for which you received sedation. Ensure it is someone we can share information with regarding your discharge. NOTE: At this time ONLY 2 ADULTS may accompany you   One person MUST stay at hospital entire time if outpatient surgery      3. You must contact your surgeon for instructions IF:  You are taking any blood thinners, aspirin, anti-inflammatory or vitamins. There is a change in your physical condition such as a cold, fever, rash, cuts, sores, or any other infection, especially near your surgical site. 4. Do not drink alcohol the day before or day of your procedure. Do not use any recreational marijuana at least 24 hours or street drugs (heroin, cocaine) at minimum 5 days prior to your procedure. 5. A Pre-Surgical History and Physical MUST be completed WITHIN 30 DAYS OR LESS prior to your procedure. by your Physician or an Urgent Care        THE DAY OF YOUR PROCEDURE:  1. Follow instructions for ARRIVAL TIME as DIRECTED BY YOUR SURGEON. 2. Enter the MAIN entrance from [employer] and follow the signs to the free Parking [employer] or [employer] (offered free of charge 7 am-5pm). 3. Enter the Main Entrance of the hospital (do not enter from the lower level of the parking garage). Upon entrance, check in with the  at the surgical information desk on your LEFT. Bring your insurance card and photo ID to register      4. DO NOT EAT ANYTHING 8 hours prior to arrival for surgery. You may have up to 8 ounces of water 4 hours prior to your arrival for surgery.    NOTE: ALL Gastric, Bariatric & Bowel surgery patients - you MUST follow your surgeon's instructions regarding eating/ drinking as you will have very specific instructions to follow. If you did not receive these, call your surgeon's office immediately. 5. MEDICATIONS:  Take the following medications with a SMALL sip of water:  per MD and levothyroxine, propranolol  Use your usual dose of inhalers the morning of surgery. BRING your rescue inhaler with you to hospital.   Anesthesia does NOT want you to take insulin the morning of surgery. They will control your blood sugar while you are at the hospital. Please contact your ordering physician for instructions regarding your insulin the night before your procedure. If you have an insulin pump, please keep it set on basal rate. Bariatric patient's call your surgeon if on diabetic medications as some may need to be stopped 1 week prior to surgery    6. Do not swallow additional water when brushing teeth. No gum, candy, mints, or ice chips. Refrain from smoking or at least decrease the amount on day of surgery. 7. Morning of surgery:   Take a shower with an antibacterial soap (i.e., Safeguard or Dial) OR your physician may have instructed you to use Hibiclens. Dress in loose, comfortable clothing appropriate for redressing after your procedure. Do not wear jewelry (including body piercings), make-up (especially NO eye make-up), fingernail polish (NO toenail polish if foot/leg surgery), lotion, powders, or metal hairclips. Do not shave or wax for 72 hours prior to procedure near your operative site. Shaving with a razor can irritate your skin and make it easier to develop an infection. On the day of your procedure, any hair that needs to be removed near the surgical site will be 'clipped' by a healthcare worker using a special clipper designed to avoid skin irritation. 8. Dentures, glasses, or contacts will need to be removed before your procedure. Bring cases for your glasses, contacts, dentures, or hearing aids to protect them while you are in surgery.       9. If you use a CPAP, please bring it with you on the day of your procedure. 10. We recommend that valuable personal belongings such as cash, cell phones, e-tablets, or jewelry, be left at home during your stay. The hospital will not be responsible for valuables that are not secured in the hospital safe. However, if your insurance requires a co-pay, you may want to bring a method of payment, i.e., Check or credit card, if you wish to pay your co-pay the day of surgery. 11. If you are to stay overnight, you may bring a bag with personal items. Please have any large items you may need brought in by your family after your arrival to your hospital room. 12. If you have a Living Will or Durable Power of , please bring a copy on the day of your procedure. How we keep you safe and work to prevent surgical site infections:   1. Health care workers should always check your ID bracelet to verify your name and birth date. You will be asked many times to state your name, date of birth, and allergies. 2. Health care workers should always clean their hands with soap or alcohol gel before providing care to you. It is okay to ask anyone if they cleaned their hands before they touch you. 3. You will be actively involved in verifying the type of procedure you are having and ensuring the correct surgical site. This will be confirmed multiple times prior to your procedure. Do NOT arin your surgery site UNLESS instructed to by your surgeon. 4. When you are in the operating room, your surgical site will be cleansed with a special soap, and in most cases, you will be given an antibiotic before the surgery begins. What to expect AFTER your procedure? 1. Immediately following your procedure, your will be taken to the PACU for the first phase of your recovery. Your nurse will help you recover from any potential side effects of anesthesia, such as extreme drowsiness, changes in your vital signs or breathing patterns. Nausea, headache, muscle aches, or sore throat may also occur after anesthesia. Your nurse will help you manage these potential side effects. 2. For comfort and safety, arrange to have someone at home with you for the first 24 hours after discharge. 3. You and your family will be given written instructions about your diet, activity, dressing care, medications, and return visits. 4. Once at home, should issues with nausea, pain, or bleeding occur, or should you notice any signs of infection, you should call your surgeon. 5. Always clean your hands before and after caring for your wound. Do not let your family touch your surgery site without cleaning their hands. 6. Narcotic pain medications can cause significant constipation. You may want to add a stool softener to your postoperative medication schedule or speak to your surgeon on how best to manage this SIDE EFFECT. SPECIAL INSTRUCTIONS     Thank you for allowing us to care for you. We strive to exceed your expectations in the delivery of care and service provided to you and your family. If you need to contact the [place] [address] staff for any reason, please call us at [phone]    Instructions reviewed with patient during preadmission testing phone interview.   aRdha Cannon RN.11/22/2022 .1:16 PM      ADDITIONAL EDUCATIONAL INFORMATION REVIEWED PER PHONE WITH YOU AND/OR YOUR FAMILY:  No Hibiclens® Bathing Instructions   Yes Antibacterial Soap

## 2022-11-23 NOTE — PROGRESS NOTES
Place patient label inside box (if no patient label, complete below)  Name:  :  MR#:   Jess Anderson / PROCEDURE  I (we), WARD PALMER  (Patient Name) authorize DR. Reggie Lowe  (Provider / Janay Sees) and/or such assistants as may be selected by him/her, to perform the following operation/procedure(s): RIGHT BREAST FAT GRAFTING AND ABDOMINOPLASTY WITH RECTUS PLICATION       Note: If unable to obtain consent prior to an emergent procedure, document the emergent reason in the medical record. This procedure has been explained to my (our) satisfaction and included in the explanation was: The intended benefit, nature, and extent of the procedure to be performed; The significant risks involved and the probability of success; Alternative procedures and methods of treatment; The dangers and probable consequences of such alternatives (including no procedure or treatment); The expected consequences of the procedure on my future health; Whether other qualified individuals would be performing important surgical tasks and/or whether  would be present to advise or support the procedure. I (we) understand that there are other risks of infection and other serious complications in the pre-operative/procedural and postoperative/procedural stages of my (our) care. I (we) have asked all of the questions which I (we) thought were important in deciding whether or not to undergo treatment or diagnosis. These questions have been answered to my (our) satisfaction. I (we) understand that no assurance can be given that the procedure will be a success, and no guarantee or warranty of success has been given to me (us). It has been explained to me (us) that during the course of the operation/procedure, unforeseen conditions may be revealed that necessitate extension of the original procedure(s) or different procedure(s) than those set forth in Paragraph 1.  I (we) authorize and request that the above-named physician, his/her assistants or his/her designees, perform procedures as necessary and desirable if deemed to be in my (our) best interest.     Revised 8/2/2021                                                                          Page 1 of 2       I acknowledge that health care personnel may be observing this procedure for the purpose of medical education or other specified purposes as may be necessary as requested and/or approved by my (our) physician. I (we) consent to the disposal by the hospital Pathologist of the removed tissue, parts or organs in accordance with hospital policy. I do ____ do not ____ consent to the use of a local infiltration pain blocking agent that will be used by my provider/surgical provider to help alleviate pain during my procedure. I do ____ do not ____ consent to an emergent blood transfusion in the case of a life-threatening situation that requires blood components to be administered. This consent is valid for 24 hours from the beginning of the procedure. This patient does ____ or does not ____ currently have a DNR status/order. If DNR order is in place, obtain Addendum to the Surgical Consent for ALL Patients with a DNR Order to address ethan-operative status for limited intervention or DNR suspension.      I have read and fully understand the above Consent for Operation/Procedure and that all blanks were completed before I signed the consent.   _____________________________       _____________________      ____/____am/pm  Signature of Patient or legal representative      Printed Name / Relationship            Date / Time   ____________________________       _____________________      ____/____am/pm  Witness to Signature                                    Printed Name                    Date / Time    If patient is unable to sign or is a minor, complete the following)  Patient is a minor, ____ years of age, or unable to sign because:   ______________________________________________________________________________________________    If a phone consent is obtained, consent will be documented by using two health care professionals, each affirming that the consenting party has no questions and gives consent for the procedure discussed with the physician/provider.   _____________________          ____________________       _____/_____am/pm   2nd witness to phone consent        Printed name           Date / Time    Informed Consent:  I have provided the explanation described above in section 1 to the patient and/or legal representative.  I have provided the patient and/or legal representative with an opportunity to ask any questions about the proposed operation/procedure.   ___________________________          ____________________         ____/____am/pm  Provider / Proceduralist                            Printed name            Date / Time  Revised 8/2/2021                                                                      Page 2 of 2

## 2022-11-28 ENCOUNTER — HOSPITAL ENCOUNTER (OUTPATIENT)
Age: 57
Setting detail: OBSERVATION
Discharge: HOME | End: 2022-11-29
Attending: SURGERY | Admitting: SURGERY
Payer: COMMERCIAL

## 2022-11-28 ENCOUNTER — ANESTHESIA (OUTPATIENT)
Dept: OPERATING ROOM | Age: 57
End: 2022-11-28
Payer: COMMERCIAL

## 2022-11-28 ENCOUNTER — ANESTHESIA EVENT (OUTPATIENT)
Dept: OPERATING ROOM | Age: 57
End: 2022-11-28
Payer: COMMERCIAL

## 2022-11-28 DIAGNOSIS — G89.18 POST-OP PAIN: Primary | ICD-10-CM

## 2022-11-28 PROBLEM — C50.919 BREAST CANCER IN FEMALE (HCC): Status: ACTIVE | Noted: 2022-11-28

## 2022-11-28 LAB — PREGNANCY, URINE: NEGATIVE

## 2022-11-28 PROCEDURE — C1729 CATH, DRAINAGE: HCPCS | Performed by: SURGERY

## 2022-11-28 PROCEDURE — 7100000001 HC PACU RECOVERY - ADDTL 15 MIN: Performed by: SURGERY

## 2022-11-28 PROCEDURE — 2580000003 HC RX 258: Performed by: ANESTHESIOLOGY

## 2022-11-28 PROCEDURE — 6370000000 HC RX 637 (ALT 250 FOR IP): Performed by: SURGERY

## 2022-11-28 PROCEDURE — 2580000003 HC RX 258: Performed by: SURGERY

## 2022-11-28 PROCEDURE — 94799 UNLISTED PULMONARY SVC/PX: CPT

## 2022-11-28 PROCEDURE — 2500000003 HC RX 250 WO HCPCS: Performed by: NURSE ANESTHETIST, CERTIFIED REGISTERED

## 2022-11-28 PROCEDURE — MISCABD ABDOMINOPLAST-RESECTION, PLCTN, REPSTN, LIPPO: Performed by: SURGERY

## 2022-11-28 PROCEDURE — 6360000002 HC RX W HCPCS: Performed by: ANESTHESIOLOGY

## 2022-11-28 PROCEDURE — 2709999900 HC NON-CHARGEABLE SUPPLY: Performed by: SURGERY

## 2022-11-28 PROCEDURE — 2500000003 HC RX 250 WO HCPCS: Performed by: SURGERY

## 2022-11-28 PROCEDURE — 3700000001 HC ADD 15 MINUTES (ANESTHESIA): Performed by: SURGERY

## 2022-11-28 PROCEDURE — 6360000002 HC RX W HCPCS: Performed by: SURGERY

## 2022-11-28 PROCEDURE — 3600000004 HC SURGERY LEVEL 4 BASE: Performed by: SURGERY

## 2022-11-28 PROCEDURE — G0378 HOSPITAL OBSERVATION PER HR: HCPCS

## 2022-11-28 PROCEDURE — 3600000014 HC SURGERY LEVEL 4 ADDTL 15MIN: Performed by: SURGERY

## 2022-11-28 PROCEDURE — 7100000000 HC PACU RECOVERY - FIRST 15 MIN: Performed by: SURGERY

## 2022-11-28 PROCEDURE — 2720000010 HC SURG SUPPLY STERILE: Performed by: SURGERY

## 2022-11-28 PROCEDURE — A4217 STERILE WATER/SALINE, 500 ML: HCPCS | Performed by: SURGERY

## 2022-11-28 PROCEDURE — 6360000002 HC RX W HCPCS: Performed by: NURSE ANESTHETIST, CERTIFIED REGISTERED

## 2022-11-28 PROCEDURE — 94150 VITAL CAPACITY TEST: CPT

## 2022-11-28 PROCEDURE — 3700000000 HC ANESTHESIA ATTENDED CARE: Performed by: SURGERY

## 2022-11-28 PROCEDURE — 15772 GRFG AUTOL FAT LIPO EA ADDL: CPT | Performed by: SURGERY

## 2022-11-28 PROCEDURE — 84703 CHORIONIC GONADOTROPIN ASSAY: CPT

## 2022-11-28 PROCEDURE — 15771 GRFG AUTOL FAT LIPO 50 CC/<: CPT | Performed by: SURGERY

## 2022-11-28 RX ORDER — ACETAMINOPHEN 325 MG/1
650 TABLET ORAL EVERY 4 HOURS PRN
Status: DISCONTINUED | OUTPATIENT
Start: 2022-11-28 | End: 2022-11-28

## 2022-11-28 RX ORDER — HYDROMORPHONE HCL 110MG/55ML
PATIENT CONTROLLED ANALGESIA SYRINGE INTRAVENOUS PRN
Status: DISCONTINUED | OUTPATIENT
Start: 2022-11-28 | End: 2022-11-28 | Stop reason: SDUPTHER

## 2022-11-28 RX ORDER — LEVOTHYROXINE SODIUM 0.2 MG/1
200 TABLET ORAL DAILY
Status: DISCONTINUED | OUTPATIENT
Start: 2022-11-29 | End: 2022-11-29 | Stop reason: HOSPADM

## 2022-11-28 RX ORDER — SODIUM CHLORIDE 9 MG/ML
INJECTION, SOLUTION INTRAVENOUS CONTINUOUS
Status: ACTIVE | OUTPATIENT
Start: 2022-11-28 | End: 2022-11-28

## 2022-11-28 RX ORDER — SODIUM CHLORIDE 0.9 % (FLUSH) 0.9 %
5-40 SYRINGE (ML) INJECTION EVERY 12 HOURS SCHEDULED
Status: DISCONTINUED | OUTPATIENT
Start: 2022-11-28 | End: 2022-11-28 | Stop reason: HOSPADM

## 2022-11-28 RX ORDER — SODIUM CHLORIDE 9 MG/ML
INJECTION, SOLUTION INTRAVENOUS PRN
Status: DISCONTINUED | OUTPATIENT
Start: 2022-11-28 | End: 2022-11-29 | Stop reason: HOSPADM

## 2022-11-28 RX ORDER — BUTALBITAL, ACETAMINOPHEN AND CAFFEINE 50; 325; 40 MG/1; MG/1; MG/1
1 TABLET ORAL EVERY 6 HOURS PRN
Status: DISCONTINUED | OUTPATIENT
Start: 2022-11-28 | End: 2022-11-29 | Stop reason: HOSPADM

## 2022-11-28 RX ORDER — OXYCODONE HYDROCHLORIDE 5 MG/1
5 TABLET ORAL EVERY 4 HOURS PRN
Status: DISCONTINUED | OUTPATIENT
Start: 2022-11-28 | End: 2022-11-29 | Stop reason: HOSPADM

## 2022-11-28 RX ORDER — ONDANSETRON 2 MG/ML
INJECTION INTRAMUSCULAR; INTRAVENOUS PRN
Status: DISCONTINUED | OUTPATIENT
Start: 2022-11-28 | End: 2022-11-28 | Stop reason: SDUPTHER

## 2022-11-28 RX ORDER — SODIUM CHLORIDE 9 MG/ML
INJECTION, SOLUTION INTRAVENOUS PRN
Status: DISCONTINUED | OUTPATIENT
Start: 2022-11-28 | End: 2022-11-28 | Stop reason: HOSPADM

## 2022-11-28 RX ORDER — PROCHLORPERAZINE EDISYLATE 5 MG/ML
5 INJECTION INTRAMUSCULAR; INTRAVENOUS
Status: DISCONTINUED | OUTPATIENT
Start: 2022-11-28 | End: 2022-11-28 | Stop reason: HOSPADM

## 2022-11-28 RX ORDER — PANTOPRAZOLE SODIUM 40 MG/1
40 TABLET, DELAYED RELEASE ORAL
Status: DISCONTINUED | OUTPATIENT
Start: 2022-11-29 | End: 2022-11-29 | Stop reason: HOSPADM

## 2022-11-28 RX ORDER — HYDRALAZINE HYDROCHLORIDE 20 MG/ML
10 INJECTION INTRAMUSCULAR; INTRAVENOUS
Status: DISCONTINUED | OUTPATIENT
Start: 2022-11-28 | End: 2022-11-28 | Stop reason: HOSPADM

## 2022-11-28 RX ORDER — ACETAMINOPHEN 325 MG/1
650 TABLET ORAL EVERY 4 HOURS PRN
Status: DISCONTINUED | OUTPATIENT
Start: 2022-11-28 | End: 2022-11-29

## 2022-11-28 RX ORDER — SODIUM CHLORIDE 0.9 % (FLUSH) 0.9 %
5-40 SYRINGE (ML) INJECTION EVERY 12 HOURS SCHEDULED
Status: DISCONTINUED | OUTPATIENT
Start: 2022-11-28 | End: 2022-11-29 | Stop reason: HOSPADM

## 2022-11-28 RX ORDER — SODIUM CHLORIDE 0.9 % (FLUSH) 0.9 %
5-40 SYRINGE (ML) INJECTION PRN
Status: DISCONTINUED | OUTPATIENT
Start: 2022-11-28 | End: 2022-11-29 | Stop reason: HOSPADM

## 2022-11-28 RX ORDER — ROCURONIUM BROMIDE 10 MG/ML
INJECTION, SOLUTION INTRAVENOUS PRN
Status: DISCONTINUED | OUTPATIENT
Start: 2022-11-28 | End: 2022-11-28 | Stop reason: SDUPTHER

## 2022-11-28 RX ORDER — ATENOLOL 25 MG/1
25 TABLET ORAL NIGHTLY
Status: DISCONTINUED | OUTPATIENT
Start: 2022-11-28 | End: 2022-11-29 | Stop reason: HOSPADM

## 2022-11-28 RX ORDER — PRIMIDONE 50 MG/1
100 TABLET ORAL NIGHTLY
Status: DISCONTINUED | OUTPATIENT
Start: 2022-11-28 | End: 2022-11-29 | Stop reason: HOSPADM

## 2022-11-28 RX ORDER — GLYCOPYRROLATE 1 MG/5 ML
SYRINGE (ML) INTRAVENOUS PRN
Status: DISCONTINUED | OUTPATIENT
Start: 2022-11-28 | End: 2022-11-28 | Stop reason: SDUPTHER

## 2022-11-28 RX ORDER — PROPOFOL 10 MG/ML
INJECTION, EMULSION INTRAVENOUS PRN
Status: DISCONTINUED | OUTPATIENT
Start: 2022-11-28 | End: 2022-11-28 | Stop reason: SDUPTHER

## 2022-11-28 RX ORDER — KETAMINE HCL IN NACL, ISO-OSM 20 MG/2 ML
SYRINGE (ML) INJECTION PRN
Status: DISCONTINUED | OUTPATIENT
Start: 2022-11-28 | End: 2022-11-28 | Stop reason: SDUPTHER

## 2022-11-28 RX ORDER — GINSENG 100 MG
CAPSULE ORAL DAILY
Status: DISCONTINUED | OUTPATIENT
Start: 2022-11-29 | End: 2022-11-29 | Stop reason: HOSPADM

## 2022-11-28 RX ORDER — PROPRANOLOL HYDROCHLORIDE 10 MG/1
10 TABLET ORAL 2 TIMES DAILY
Status: DISCONTINUED | OUTPATIENT
Start: 2022-11-28 | End: 2022-11-29 | Stop reason: HOSPADM

## 2022-11-28 RX ORDER — SODIUM CHLORIDE 0.9 % (FLUSH) 0.9 %
5-40 SYRINGE (ML) INJECTION PRN
Status: DISCONTINUED | OUTPATIENT
Start: 2022-11-28 | End: 2022-11-28 | Stop reason: HOSPADM

## 2022-11-28 RX ORDER — DIPHENHYDRAMINE HYDROCHLORIDE 50 MG/ML
12.5 INJECTION INTRAMUSCULAR; INTRAVENOUS
Status: DISCONTINUED | OUTPATIENT
Start: 2022-11-28 | End: 2022-11-28 | Stop reason: HOSPADM

## 2022-11-28 RX ORDER — ONDANSETRON 2 MG/ML
4 INJECTION INTRAMUSCULAR; INTRAVENOUS EVERY 6 HOURS PRN
Status: DISCONTINUED | OUTPATIENT
Start: 2022-11-28 | End: 2022-11-29 | Stop reason: HOSPADM

## 2022-11-28 RX ORDER — ACETAMINOPHEN 500 MG
1000 TABLET ORAL EVERY 8 HOURS SCHEDULED
Status: DISCONTINUED | OUTPATIENT
Start: 2022-11-28 | End: 2022-11-28

## 2022-11-28 RX ORDER — BUPROPION HYDROCHLORIDE 150 MG/1
150 TABLET ORAL DAILY
Status: DISCONTINUED | OUTPATIENT
Start: 2022-11-28 | End: 2022-11-28

## 2022-11-28 RX ORDER — ONDANSETRON 4 MG/1
4 TABLET, ORALLY DISINTEGRATING ORAL EVERY 8 HOURS PRN
Status: DISCONTINUED | OUTPATIENT
Start: 2022-11-28 | End: 2022-11-29 | Stop reason: HOSPADM

## 2022-11-28 RX ORDER — ESCITALOPRAM OXALATE 20 MG/1
20 TABLET ORAL DAILY
Status: DISCONTINUED | OUTPATIENT
Start: 2022-11-28 | End: 2022-11-29 | Stop reason: HOSPADM

## 2022-11-28 RX ORDER — MORPHINE SULFATE 2 MG/ML
4 INJECTION, SOLUTION INTRAMUSCULAR; INTRAVENOUS
Status: DISCONTINUED | OUTPATIENT
Start: 2022-11-28 | End: 2022-11-29 | Stop reason: HOSPADM

## 2022-11-28 RX ORDER — ESTRADIOL 0.1 MG/G
2 CREAM VAGINAL NIGHTLY
Status: DISCONTINUED | OUTPATIENT
Start: 2022-11-28 | End: 2022-11-28

## 2022-11-28 RX ORDER — CLONAZEPAM 1 MG/1
1 TABLET ORAL 2 TIMES DAILY PRN
Status: DISCONTINUED | OUTPATIENT
Start: 2022-11-28 | End: 2022-11-29 | Stop reason: HOSPADM

## 2022-11-28 RX ORDER — MEPERIDINE HYDROCHLORIDE 25 MG/ML
12.5 INJECTION INTRAMUSCULAR; INTRAVENOUS; SUBCUTANEOUS AS NEEDED
Status: DISCONTINUED | OUTPATIENT
Start: 2022-11-28 | End: 2022-11-28 | Stop reason: HOSPADM

## 2022-11-28 RX ORDER — SODIUM CHLORIDE, SODIUM LACTATE, POTASSIUM CHLORIDE, CALCIUM CHLORIDE 600; 310; 30; 20 MG/100ML; MG/100ML; MG/100ML; MG/100ML
INJECTION, SOLUTION INTRAVENOUS CONTINUOUS
Status: DISCONTINUED | OUTPATIENT
Start: 2022-11-28 | End: 2022-11-28 | Stop reason: HOSPADM

## 2022-11-28 RX ORDER — LIDOCAINE HYDROCHLORIDE 20 MG/ML
INJECTION, SOLUTION INTRAVENOUS PRN
Status: DISCONTINUED | OUTPATIENT
Start: 2022-11-28 | End: 2022-11-28 | Stop reason: SDUPTHER

## 2022-11-28 RX ORDER — MORPHINE SULFATE 2 MG/ML
2 INJECTION, SOLUTION INTRAMUSCULAR; INTRAVENOUS
Status: DISCONTINUED | OUTPATIENT
Start: 2022-11-28 | End: 2022-11-29 | Stop reason: HOSPADM

## 2022-11-28 RX ORDER — OXYCODONE HYDROCHLORIDE 5 MG/1
10 TABLET ORAL EVERY 4 HOURS PRN
Status: DISCONTINUED | OUTPATIENT
Start: 2022-11-28 | End: 2022-11-29 | Stop reason: HOSPADM

## 2022-11-28 RX ORDER — HEPARIN SODIUM 5000 [USP'U]/ML
INJECTION, SOLUTION INTRAVENOUS; SUBCUTANEOUS PRN
Status: DISCONTINUED | OUTPATIENT
Start: 2022-11-28 | End: 2022-11-28 | Stop reason: SDUPTHER

## 2022-11-28 RX ORDER — LIDOCAINE HYDROCHLORIDE 10 MG/ML
1 INJECTION, SOLUTION EPIDURAL; INFILTRATION; INTRACAUDAL; PERINEURAL
Status: DISCONTINUED | OUTPATIENT
Start: 2022-11-28 | End: 2022-11-28 | Stop reason: HOSPADM

## 2022-11-28 RX ORDER — ONDANSETRON 2 MG/ML
4 INJECTION INTRAMUSCULAR; INTRAVENOUS
Status: DISCONTINUED | OUTPATIENT
Start: 2022-11-28 | End: 2022-11-28 | Stop reason: HOSPADM

## 2022-11-28 RX ORDER — MAGNESIUM HYDROXIDE 1200 MG/15ML
LIQUID ORAL CONTINUOUS PRN
Status: DISCONTINUED | OUTPATIENT
Start: 2022-11-28 | End: 2022-11-28 | Stop reason: HOSPADM

## 2022-11-28 RX ADMIN — MORPHINE SULFATE 4 MG: 2 INJECTION, SOLUTION INTRAMUSCULAR; INTRAVENOUS at 18:29

## 2022-11-28 RX ADMIN — HYDROMORPHONE HYDROCHLORIDE 0.5 MG: 1 INJECTION, SOLUTION INTRAMUSCULAR; INTRAVENOUS; SUBCUTANEOUS at 09:56

## 2022-11-28 RX ADMIN — CEFAZOLIN 2000 MG: 2 INJECTION, POWDER, FOR SOLUTION INTRAMUSCULAR; INTRAVENOUS at 07:40

## 2022-11-28 RX ADMIN — HYDROMORPHONE HYDROCHLORIDE 0.25 MG: 1 INJECTION, SOLUTION INTRAMUSCULAR; INTRAVENOUS; SUBCUTANEOUS at 12:13

## 2022-11-28 RX ADMIN — PROPOFOL 40 MG: 10 INJECTION, EMULSION INTRAVENOUS at 09:27

## 2022-11-28 RX ADMIN — OXYCODONE 10 MG: 5 TABLET ORAL at 21:00

## 2022-11-28 RX ADMIN — OXYCODONE 5 MG: 5 TABLET ORAL at 16:37

## 2022-11-28 RX ADMIN — PRIMIDONE 100 MG: 50 TABLET ORAL at 21:07

## 2022-11-28 RX ADMIN — ROCURONIUM BROMIDE 10 MG: 10 INJECTION INTRAVENOUS at 09:00

## 2022-11-28 RX ADMIN — MORPHINE SULFATE 4 MG: 2 INJECTION, SOLUTION INTRAMUSCULAR; INTRAVENOUS at 13:55

## 2022-11-28 RX ADMIN — HYDROMORPHONE HYDROCHLORIDE 0.4 MG: 2 INJECTION, SOLUTION INTRAMUSCULAR; INTRAVENOUS; SUBCUTANEOUS at 08:59

## 2022-11-28 RX ADMIN — TRANEXAMIC ACID 1000 MG: 1 INJECTION, SOLUTION INTRAVENOUS at 07:49

## 2022-11-28 RX ADMIN — AMITRIPTYLINE HYDROCHLORIDE 75 MG: 50 TABLET, FILM COATED ORAL at 21:00

## 2022-11-28 RX ADMIN — BUTALBITAL, ACETAMINOPHEN, AND CAFFEINE 1 TABLET: 50; 325; 40 TABLET ORAL at 18:31

## 2022-11-28 RX ADMIN — Medication 0.2 MG: at 08:37

## 2022-11-28 RX ADMIN — MORPHINE SULFATE 2 MG: 2 INJECTION, SOLUTION INTRAMUSCULAR; INTRAVENOUS at 22:21

## 2022-11-28 RX ADMIN — ONDANSETRON 8 MG: 2 INJECTION INTRAMUSCULAR; INTRAVENOUS at 07:32

## 2022-11-28 RX ADMIN — LIDOCAINE HYDROCHLORIDE 100 MG: 20 INJECTION, SOLUTION INTRAVENOUS at 07:35

## 2022-11-28 RX ADMIN — SODIUM CHLORIDE: 9 INJECTION, SOLUTION INTRAVENOUS at 14:02

## 2022-11-28 RX ADMIN — SODIUM CHLORIDE, POTASSIUM CHLORIDE, SODIUM LACTATE AND CALCIUM CHLORIDE: 600; 310; 30; 20 INJECTION, SOLUTION INTRAVENOUS at 06:43

## 2022-11-28 RX ADMIN — ROCURONIUM BROMIDE 70 MG: 10 INJECTION INTRAVENOUS at 07:36

## 2022-11-28 RX ADMIN — ESCITALOPRAM OXALATE 20 MG: 20 TABLET ORAL at 13:55

## 2022-11-28 RX ADMIN — HYDROMORPHONE HYDROCHLORIDE 0.2 MG: 2 INJECTION, SOLUTION INTRAMUSCULAR; INTRAVENOUS; SUBCUTANEOUS at 08:57

## 2022-11-28 RX ADMIN — HYDROMORPHONE HYDROCHLORIDE 1.2 MG: 2 INJECTION, SOLUTION INTRAMUSCULAR; INTRAVENOUS; SUBCUTANEOUS at 07:35

## 2022-11-28 RX ADMIN — HYDROMORPHONE HYDROCHLORIDE 0.2 MG: 2 INJECTION, SOLUTION INTRAMUSCULAR; INTRAVENOUS; SUBCUTANEOUS at 08:53

## 2022-11-28 RX ADMIN — ROCURONIUM BROMIDE 30 MG: 10 INJECTION INTRAVENOUS at 08:35

## 2022-11-28 RX ADMIN — HYDROMORPHONE HYDROCHLORIDE 0.5 MG: 1 INJECTION, SOLUTION INTRAMUSCULAR; INTRAVENOUS; SUBCUTANEOUS at 10:15

## 2022-11-28 RX ADMIN — PROPOFOL 150 MG: 10 INJECTION, EMULSION INTRAVENOUS at 07:35

## 2022-11-28 RX ADMIN — SUGAMMADEX 200 MG: 100 INJECTION, SOLUTION INTRAVENOUS at 09:36

## 2022-11-28 RX ADMIN — Medication 20 MG: at 07:35

## 2022-11-28 RX ADMIN — SODIUM CHLORIDE, POTASSIUM CHLORIDE, SODIUM LACTATE AND CALCIUM CHLORIDE: 600; 310; 30; 20 INJECTION, SOLUTION INTRAVENOUS at 08:32

## 2022-11-28 RX ADMIN — HEPARIN SODIUM 5000 UNITS: 5000 INJECTION, SOLUTION INTRAVENOUS; SUBCUTANEOUS at 07:31

## 2022-11-28 RX ADMIN — HYDROMORPHONE HYDROCHLORIDE 0.25 MG: 1 INJECTION, SOLUTION INTRAMUSCULAR; INTRAVENOUS; SUBCUTANEOUS at 10:56

## 2022-11-28 ASSESSMENT — PAIN DESCRIPTION - LOCATION
LOCATION: ABDOMEN
LOCATION: HEAD
LOCATION: ABDOMEN

## 2022-11-28 ASSESSMENT — PAIN DESCRIPTION - ORIENTATION
ORIENTATION: MID;LOWER;LEFT;RIGHT
ORIENTATION: MID
ORIENTATION: RIGHT;LEFT;MID;LOWER
ORIENTATION: MID
ORIENTATION: MID
ORIENTATION: RIGHT;LEFT;MID;LOWER
ORIENTATION: MID
ORIENTATION: LEFT
ORIENTATION: MID

## 2022-11-28 ASSESSMENT — PAIN SCALES - GENERAL
PAINLEVEL_OUTOF10: 7
PAINLEVEL_OUTOF10: 4
PAINLEVEL_OUTOF10: 7
PAINLEVEL_OUTOF10: 5
PAINLEVEL_OUTOF10: 7
PAINLEVEL_OUTOF10: 5
PAINLEVEL_OUTOF10: 7
PAINLEVEL_OUTOF10: 3
PAINLEVEL_OUTOF10: 7
PAINLEVEL_OUTOF10: 8
PAINLEVEL_OUTOF10: 5
PAINLEVEL_OUTOF10: 4

## 2022-11-28 ASSESSMENT — PAIN DESCRIPTION - DESCRIPTORS
DESCRIPTORS: ACHING;DISCOMFORT
DESCRIPTORS: ACHING
DESCRIPTORS: ACHING;DISCOMFORT
DESCRIPTORS: ACHING
DESCRIPTORS: SHARP
DESCRIPTORS: STABBING;SHARP
DESCRIPTORS: ACHING;DISCOMFORT
DESCRIPTORS: SHARP

## 2022-11-28 ASSESSMENT — PAIN DESCRIPTION - PAIN TYPE
TYPE: SURGICAL PAIN

## 2022-11-28 ASSESSMENT — LIFESTYLE VARIABLES: SMOKING_STATUS: 0

## 2022-11-28 ASSESSMENT — PAIN - FUNCTIONAL ASSESSMENT: PAIN_FUNCTIONAL_ASSESSMENT: 0-10

## 2022-11-28 NOTE — PROGRESS NOTES
4 Eyes Admission Assessment     I agree as the admission nurse that 2 RN's have performed a thorough Head to Toe Skin Assessment on the patient. ALL assessment sites listed below have been assessed on admission. Areas assessed by both nurses:   [x]   Head, Face, and Ears   [x]   Shoulders, Back, and Chest  [x]   Arms, Elbows, and Hands   [x]   Coccyx, Sacrum, and Ischium  [x]   Legs, Feet, and Heels        Does the Patient have Skin Breakdown?   No         Abilio Prevention initiated:  No   Wound Care Orders initiated:  No      [facility] nurse consulted for Pressure Injury (Stage 3,4, Unstageable, DTI, NWPT, and Complex wounds) or Abilio score 18 or lower:  No      Nurse 1 eSignature: Electronically signed by Kevin Thomas RN on 11/28/22 at 2:13 PM EST    **SHARE this note so that the co-signing nurse is able to place an eSignature**    Nurse 2 eSignature: Electronically signed by Lily Marie RN on 11/28/22 at 5:26 PM EST

## 2022-11-28 NOTE — PROGRESS NOTES
Department of Surgery  Post Op Note    Subjective:  Reports abdominal pain was controlled in PACU but now increasing. Reports headache (chronic) but attributes this to stress and NPO status. Tolerating ice chips. Denies nausea. C/o dry mouth. Objective:  Anesthesia type: General    I/O    Intra op    Post op     Fluids    1917cc      EBL  75cc       Urine 500cc (ortiz)      Post-op vital signs: BP (!) 114/91   Pulse 69   Temp 98.1 °F (36.7 °C) (Temporal)   Resp 13   Ht 5' 5\" (1.651 m)   Wt 174 lb (78.9 kg)   LMP 02/17/2015 Comment: Spotting   SpO2 100%   BMI 28.96 kg/m²       Exam:  General appearance: alert, appears stated age, and cooperative  Chest/Lungs:  unlabored on 2L NC  , right breast with steristrip from fat grafting site, post-op bra on. Heart: RRR, no pedal edema   Abdomen:  appropriately tender, incision closed with sutures, foam and abdominal binder in place. Umbilicus: covered with dressing   Bilateral abdominal CA drains dark sanguinous output      Assessment and Plan  Pt is a 56y.o. year old female s/p Right breast fat grafting, Abdominoplasty with rectus plication and umbilical transposition, POD #0     Pain management: Tylenol, oxycodone, morphine   FeNa:  Diet - Regular , Fluids NS @ 100ml/hour   :  Ortiz removed at 0943. Void check by 1800. Activity: [address] chair position, ambulate in \"hunched forward position\"   Respiratory:  IS, encourage hourly IS and deep breathing  Prophylaxis: SCDs only as patient has Allergy noted to heparin. CA drains: empty, recompress q8 and PRN   Abdominal binder to remain in place at all times. Bacitracin ordered.  Please have at bedside for surgical team to place on umbilicus in the morning 11/29     HOLLIE Gardner CNP 11/28/2022 2:18 PM   Available via [employer] [phone] M, T, W, F this week

## 2022-11-28 NOTE — FLOWSHEET NOTE
Harrison Pearson RN from [address] [place] [address] at bedside received report.  Called patients  [facility] updated him on patients room number and her status

## 2022-11-28 NOTE — PROGRESS NOTES
PACU Transfer Note    Vitals:    11/28/22 1300   BP: (!) 114/91   Pulse: 69   Resp: 13   Temp: 98.1 °F (36.7 °C)   SpO2: 100%   BP within 20% of baseline    In: 2077 [P.O.:110; I.V.:1917]  Out: 560 [Urine:500; Drains:30]    Pain assessment:  present - adequately treated, patient was given pain meds and patient is satisfied. Pain Level: 4    Report given to Receiving unit RN. Report Given to [facility]. Patient transferred to [address] with all belongings. Patient  Daysi Burgos in waiting area on [address] awaiting patient arrival to her room.      11/28/2022 1:10 PM

## 2022-11-28 NOTE — FLOWSHEET NOTE
Patient states pain is 3/10 and tolerable. VSS. Taking ice chips with no nausea. Meets the criteria for transfer to floor. Will place in clinical discharge at this time. Will change assessments to every 2 hours and vital signs to every hour.

## 2022-11-28 NOTE — FLOWSHEET NOTE
Call placed to Dr. Eliecer Beatty for post op/admission orders. There is no answer. Call placed in to OR to message for orders.

## 2022-11-28 NOTE — H&P
Wu Beckwith    [id number]    [facility] Same Day Surgery Update H & P  Department of General Surgery   Surgical Service   Pre-operative History and Physical  Last H & P within the last 30 days. DIAGNOSIS:   Malignant neoplasm of female breast, unspecified estrogen receptor status, unspecified laterality, unspecified site of breast ([facility] [address] [C50.919]  S/P breast reconstruction [Z98.890]  Excess skin of abdomen [L98.7]    Procedure(s):  RIGHT BREAST FAT GRAFTING AND  ABDOMINOPLASTY WITH RECTUS PLICATION    History obtained from: Patient interview and EHR      HISTORY OF PRESENT ILLNESS:   The patient is a 56 y.o. female with c/o right breast contour irregularity presents today for the above procedure. Illness Screening: Patient denies fever, chills, worsening cough, or close contact with sick individuals.         Past Medical History:        Diagnosis Date    Anxiety     Breast cancer ([facility]     at 47    Cervical disc disorder at C6-C7 level with myelopathy 2012    Cervical spondylarthritis     Class 1 obesity due to excess calories without serious comorbidity with body mass index (BMI) of 30.0 to 30.9 in adult 08/06/2018    COVID-19 01/2021    Depression     Essential tremor     Hyperlipidemia 10/08/2021    Hypothyroidism     Migraine     hx depakote, nadalol, topamax (stomach upset)    Neuropathy     Reactive hypertension 12/22/2021    TMJ (temporomandibular joint syndrome)     TMJ disease     Urinary incontinence 08/24/2016    Wears glasses      Past Surgical History:        Procedure Laterality Date    BREAST LUMPECTOMY      right at  46 yo     BREAST REDUCTION SURGERY Left 12/30/2021    LEFT BREAST REDUCTION/ performed by Jp Perez MD at [facility] Right 12/30/2021    RIGHT BREAST FAT GRAFTING performed by Jp Perez MD at [facility] Right 2013    SPINAL FUSION  2013    TONSILLECTOMY         Medications Prior to Admission:      Prior to Admission medications Medication Sig Start Date End Date Taking? Authorizing Provider   naltrexone (DEPADE) 50 MG tablet Take by mouth daily 8/1/22  Yes Historical Provider, MD   amitriptyline (ELAVIL) 25 MG tablet TAKE 3 TABLETS BY MOUTH AT BEDTIME 11/14/22   Smita Gerard MD   omeprazole (PRILOSEC) 20 MG delayed release capsule TAKE 1 CAPSULE BY MOUTH EVERY DAY 11/7/22   Smita Gerard MD   levothyroxine (SYNTHROID) 200 MCG tablet TAKE 1 TABLET BY MOUTH EVERY DAY 10/10/22   Smita Gerard MD   atenolol (TENORMIN) 25 MG tablet TAKE 1 TABLET BY MOUTH EVERY DAY 7/13/22   Smita Gerard MD   escitalopram (LEXAPRO) 20 MG tablet TAKE 1 TABLET BY MOUTH EVERY DAY 7/13/22   Smita Gerard MD   estradiol (ESTRACE) 0.1 MG/GM vaginal cream APPLY 0.5 G BY VAGINAL ROUTE 2 TIMES A WEEK FOR 90 DAYS 5/19/22   Historical Provider, MD   clonazePAM (KLONOPIN) 1 MG tablet Take 1 tablet by mouth 2 times daily as needed for Anxiety for up to 30 days. 6/9/22 11/28/22  Smita Gerard MD   ciclopirox (PENLAC) 8 % solution Apply on affected nail  nightly. 5/9/22   Smita Gerard MD   buPROPion (WELLBUTRIN XL) 300 MG extended release tablet TAKE 1 TABLET BY MOUTH EVERY DAY IN THE MORNING 5/6/22   Smita Gerard MD   baclofen (LIORESAL) 10 MG tablet TAKE 1 TABLET (10 MG TOTAL) BY MOUTH 3 TIMES A DAY. 1 BY MOUTH TWICE A DAY AS NEEDED HEADACHE 9/19/21   Historical Provider, MD   EMGALITY 120 MG/ML SOAJ  10/1/21   Historical Provider, MD   butalbital-acetaminophen-caffeine (FIORICET, ESGIC) -40 MG per tablet Take 1 tablet by mouth every 6 hours as needed for Headaches 10/8/21   Smita Geradr MD   SUMAtriptan (IMITREX) 100 MG tablet TAKE ONE TABLET BY MOUTH AT ONSET OF HEADACHE. MAY REPEAT IN 2 HOURS MAX OF 2 TABS PER 24 HOURS 8/19/21   Smita Gerard MD   propranolol (INDERAL) 10 MG tablet Take 1 tablet by mouth 2 times daily 5/11/20   Smita Gerard MD   primidone (MYSOLINE) 50 MG tablet Take 100 mg by mouth nightly.     Historical Provider, MD Allergies:  Heparin    PHYSICAL EXAM:      /79   Pulse 73   Temp 99.3 °F (37.4 °C) (Temporal)   Resp 16   Ht 5' 5\" (1.651 m)   Wt 174 lb (78.9 kg)   LMP 02/17/2015 Comment: Spotting   SpO2 97%   BMI 28.96 kg/m²      Airway:  Airway patent with no audible stridor    Heart:  Regular rate and rhythm, No murmur noted    Lungs:  No increased work of breathing, good air exchange, clear to auscultation bilaterally, no crackles or wheezing    Abdomen:  Soft, non-distended, non-tender, no rebound tenderness or guarding, and no masses palpated    ASSESSMENT AND PLAN     Patient is a 56 y.o. female with above specified procedure planned. 1.  The patients history and physical was obtained and signed off by the pre-admission testing department. Patient seen and focused exam done today- no new changes since last physical exam on 11/14/22    2. Access to ancillary services are available per request of the provider.     HOLLIE Lee - JACKSON     11/28/2022

## 2022-11-28 NOTE — DISCHARGE INSTRUCTIONS
[address] [facility]    Lynnette Obregon MD  [address] [place] [address] ([address]  [phone]    Post-op Instructions  ___________________________________________    Abdominoplasty     The following instructions will help you know what to expect in the days following your surgery. Do not, however, hesitate to call if you have questions or concerns. Activities   No driving, lifting, or strenuous activity. Walking is necessary after you are discharged to prevent blood clots. Walk for 10 to 15 minutes every 2 hours. Do not sit up directly from the reclining position. When getting out of bed, turn onto your side, slide your legs out of bed, and use your arms to push yourself upright. When sitting, use the spirometer (breathing device) 10 times every 2 hours minimum. Wear your abdominal binder day and night for 3 weeks. Keep it snug. This reduces bruising and prevents fluid from accumulating under the flap. During the first few days after surgery, most patients find it more comfortable to sleep in a flexed position. Use pillows to keep your head and shoulders elevated, and place a pillow under your knees. If you are not comfortable sleeping in this position, you may sleep flat. It is more important to get a good nights rest   You may shower 48 hours after surgery. Be sure to replace your abdominal binder snugly after your shower. Do not tub bathe or use hot water in the shower. Warm water is okay. Do not exercise or do any activity that raises your heart rate or blood pressure for 2 to 3 weeks. You may resume light activity such as walking and light lifting (up to 5 pounds) within the first few days after surgery. At your follow-up appointment you may discuss resuming any strenuous activity such as biking, swimming, aerobics, or weightlifting (usually three to six weeks). Also, when returning to more exercise, start slowly and gradually work up to your daily routine. Diet   Resume a normal diet as tolerated. Special Instructions     There is absolutely NO driving while on pain medication or Valium® (see medicine list given to you with your pre-operative paperwork)    It is extremely important that you do not smoke or have any form of nicotine for a minimum of 2 weeks after surgery. Smoking might delay healing and increase your risk of postoperative complications. Do NOT take any of the following products:   Aspirin/low-dose aspirin   Ibuprofen (Advil®, Motrin®)   Naprosyn or Naproxen (Aleve®)   Vitamin E (even small amounts in multiple vitamins)   Herbals or homeopathic medicines or green tea   Protein supplements (shakes, energy drinks)   Growth hormone   Diet pills (Meridia®, Metabolife®, etc)    TYLENOL-containing products (acetaminophen) are safe to take. (If you not are not sure what products to take or avoid, call the office.)    Medications  Take your medications as prescribed. Antibiotic to prevent infection:   Pain medication, if needed:   Valium to prevent muscle spasm:   Other: Lactobacillus (Culturelle) probiotic while taking antibiotics. You can obtain this over the counter or within any yogurt that specifically has the active ingredient: L. acidophilus (Lactobacillus acidophilus). Wound Care   If you are discharged with drains in, record the amount that you empty every eight hours or when 1/3 full. Make sure to keep the outputs for each drain separate, so we can determine the output for each individual drain at your follow-up appointment. Total the output in cc for each 24-hour period on the Drainage Record Sheet. We will want to know this amount. The drainage can vary in color from yellowish to red. Drains will not be removed until each drain has less than 30 cc in a 24-hour period for 2 consecutive days. Once your drains are removed, fluid can leak from the drain holes.  This can occur when walking or rolling over in bed and can saturate your clothes or linens. This is normal.   Any drains may be attached to a belt or cloth strap while showering to prevent pulling.   - Drains are typically removed within 7 to 10 days after surgery, but may remain in place for longer. They are removed when drainage is less than 30 cc over a 24-hour period for 2 consecutive days. Careful recording is important. Be sure to bring your recordings to your follow-up appointment(s). - You may have one of the two types of drain dressings:   1. Occlusive dressing: This dressing does not need to be changed unless the white disc under the plastic (Tegaderm) covering becomes wet. If the white disc does become wet, please wash hands before starting and have a clean work  surface. Remove the top covering and the disc then redress using the traditional dressing. 2. Traditional gauze dressing: This dressing is to be changed once each day. Please wash hands before starting and have clean work surface area to open  the dressing material on. Open the surgi-bra and remove the old dressing. Open the packages of dressings and select the gauze with the slit; place the slit around the tube (where it comes out of your body). Take the other gauze and place it on top. Close the surgi-bra, this will hold the dressing in place. DO NOT USE TAPE. - In the event of accidental drain removal, place a piece of dry gauze over the drain site and contact your surgeon. - If your drain bulb loses suction or your drain has migrated out from the drain site, do not attempt to push the drain back into your skin. Cover the area with dry gauze. You may be asked by your surgeon to place a piece of semi occlusive dressing (Tegaderm) over the drain site to keep the site clean and drain in place until you are seen in the office   Each day, apply a small amount of antibiotic ointment (Bacitracin®, Neosporin®, or Bactroban®) to your belly button and to the drain holes.    It is normal to experience swelling and bruising of the abdomen and groin for the first few weeks after surgery. Other normal experiences include:   Tightness and discomfort of the abdominal muscles for several weeks   Areas of numbness of the abdominal skin for up to six months   A small amount of drainage during the first few days   Redness of surgical scars for three to six months after surgery (This will then slowly fade.)     Miscellaneous   If you are unable to urinate once you are home, call the office and you will be directed as to what to do. Follow up  Usually there are no sutures to remove. You will have a follow up appointment when you are ready to have the drains removed, approximately 1 week after surgery. Please call your doctors office at the first sign of:   Excessive (severe) pain associated with pressure and enlargement of the abdomen   Redness, drainage, or odor from the incision(s) or drains   Fever or chills   Shortness of breath   It is best to avoid exposing the scar to the sun and to wear sun protection of at least SPF 30 for at least 6 months after surgery.

## 2022-11-28 NOTE — ANESTHESIA POSTPROCEDURE EVALUATION
Department of Anesthesiology  Postprocedure Note    Patient: Ricardo Kovacs  MRN: [id number]  YOB: 1965  Date of evaluation: 11/28/2022      Procedure Summary     Date: 11/28/22 Room / Location: [address] [place] [address] [address] / [facility]    Anesthesia Start: 5248 Anesthesia Stop: 0850    Procedures:       RIGHT BREAST FAT GRAFTING AND (Right)      ABDOMINOPLASTY WITH RECTUS PLICATION Diagnosis:       Malignant neoplasm of female breast, unspecified estrogen receptor status, unspecified laterality, unspecified site of breast ([address]      S/P breast reconstruction      Excess skin of abdomen      (Malignant neoplasm of female breast, unspecified estrogen receptor status, unspecified laterality, unspecified site of breast ([address] [C50.919])      (S/P breast reconstruction [R59.736])      (Excess skin of abdomen [L98.7])    Surgeons: Rosalinda Campos MD Responsible Provider: Gregg Ramirez DO    Anesthesia Type: general ASA Status: 3          Anesthesia Type: No value filed.     Zeinab Phase I: Zeinab Score: 9    Zeinab Phase II:        Anesthesia Post Evaluation    Patient location during evaluation: PACU  Patient participation: complete - patient participated  Level of consciousness: awake and alert  Pain score: 2  Airway patency: patent  Nausea & Vomiting: no nausea and no vomiting  Complications: no  Cardiovascular status: hemodynamically stable  Respiratory status: acceptable  Hydration status: stable

## 2022-11-28 NOTE — PROGRESS NOTES
Patient alert and oriented x4, VSS. Midline incision assessed with NP, clean, dry, and intact. Right breast incision clean, dry, and intact. CA drains with moderate output. Pain being managed with PRN pain medication. Pt tolerating fluids, ordered dinner. Pt complain of migraine, PRN requested, awaiting pharmacy. Patient is resting in bed with no other needs at this time.     Electronically signed by Griselda Goring, RN on 11/28/2022 at 5:37 PM

## 2022-11-28 NOTE — OP NOTE
[place] [address] SURGERY     OPERATIVE DICTATION    NAME: Carmencita Coelho   MRN: [id number]  DATE: 11/28/2022     AGE: 56 y.o. SURGEON: Radha Coates MD  ASSISTANT: Daniel Yousif ()     PREOPERATIVE DIAGNOSIS: Right breast deformity secondary to breast cancer, unacceptable appearance to abdomen  POSTOPERATIVE DIAGNOSIS: Same     OPERATION: 1) Right breast fat grafting    2) Abdominoplasty with rectus plication and umbilical transposition    ANESTHESIA: General (1 hour cosmetic)    ESTIMATED BLOOD LOSS: 75 mL    OPERATIVE INDICATIONS: This is a 56 y.o. female who has previously undergone a partial mastectomy for right breast cancer with adjuvant radiation therapy in 2005. She has had issues with significant asymmetry as well as contour deformity after lumpectomy and radiation therapy. She underwent fat grafting with matching contralateral reduction approximately 1 year ago. She did well with significant improvement in her asymmetry, however she notes that there is still some contour deformity and she desired improvement in this. Additionally, she notes desire for improvement in the removal of excess skin from her abdomen and improvement in contour. The plan of surgery, risks, benefits, alternatives, indications, limitations, possible complications, and future surgeries (including multiple rounds of fat grafting) were discussed with the patient and she agreed to proceed. OPERATIVE PROCEDURE: The patient was marked in the standing position in the preoperative holding area. She was then brought to the operating room and placed in the supine position on the operating table. After satisfactory induction with general endotracheal anesthesia, the patient was then prepped and draped in the usual sterile fashion. The case began by infiltrating tumescent solution into the lower abdomen and lateral flanks.  After a satisfactory amount of time for epinephrine effect, liposuction was performed using a Revolve system with care not to perform above the markings in the upper abdomen. A total of 100 cc of lipoaspirate was utilized for fat grafting to the right breast after scar release with subcision. After the injection was completed there was significant improvement in the contour. The sites were then closed using 5-0 Fast Gut. The patient was then sat back down and the abdominoplasty portion of the case began. The lower transverse incision was performed with a 10 blade and dissection was performed cranially overlying the fascia of the abdominal wall. After circumferentially dissecting the umbilicus free, dissection continued to the xiphoid process with care to preserve perforators laterally. The rectus diastasis was marked and then multiple rows of plication sutures using #1 PDS were performed with improvement in the contour and diastasis. Upon completion, hemostasis was obtained with electrocautery, clips, Surgicel powder, and VistaSeal and was satisfactory. The excess skin was then excised after the patient was placed in the sitting position. Two 15F drains were brought out through the separate stab incisions and secured in place using 3-0 Nylon sutures. The abdomen was then closed in layers using 2-0 Vicryl followed by 3-0 Monocryl and Stratafix sutures. A neoumbilicus was then fashioned using 3-0 Monocryl sutures in an oval manner. Sterile dressings were then applied. The patient was then awakened, extubated, and taken to the PACU in stable condition. At the end of the case, all counts were correct and there were no immediate complications. The patient tolerated the procedure well.     DRAINS: 2 (15F round)    SPECIMEN: None to pathology (abdominal tissue 3.2 lbs)    CONDITION: Stable    Johnathan Weaver MD

## 2022-11-28 NOTE — ANESTHESIA PRE PROCEDURE
Department of Anesthesiology  Preprocedure Note       Name:  Linda Reyes   Age:  56 y.o.  :  1965                                          MRN:  [id number]         Date:  2022      Surgeon: Edel Coy):  Michael Patel MD    Procedure: Procedure(s):  RIGHT BREAST FAT GRAFTING AND  ABDOMINOPLASTY WITH RECTUS PLICATION    Medications prior to admission:   Prior to Admission medications    Medication Sig Start Date End Date Taking? Authorizing Provider   naltrexone (DEPADE) 50 MG tablet Take by mouth daily 22  Yes Historical Provider, MD   amitriptyline (ELAVIL) 25 MG tablet TAKE 3 TABLETS BY MOUTH AT BEDTIME 22   Jana Broussard MD   omeprazole (PRILOSEC) 20 MG delayed release capsule TAKE 1 CAPSULE BY MOUTH EVERY DAY 22   Jana Broussard MD   levothyroxine (SYNTHROID) 200 MCG tablet TAKE 1 TABLET BY MOUTH EVERY DAY 10/10/22   Jana Broussard MD   atenolol (TENORMIN) 25 MG tablet TAKE 1 TABLET BY MOUTH EVERY DAY 22   Jana Broussard MD   escitalopram (LEXAPRO) 20 MG tablet TAKE 1 TABLET BY MOUTH EVERY DAY 22   Jana Broussard MD   estradiol (ESTRACE) 0.1 MG/GM vaginal cream APPLY 0.5 G BY VAGINAL ROUTE 2 TIMES A WEEK FOR 90 DAYS 22   Historical Provider, MD   clonazePAM (KLONOPIN) 1 MG tablet Take 1 tablet by mouth 2 times daily as needed for Anxiety for up to 30 days. 22  Jana Broussard MD   ciclopirox (PENLAC) 8 % solution Apply on affected nail  nightly.  22   Jana Broussard MD   buPROPion (WELLBUTRIN XL) 300 MG extended release tablet TAKE 1 TABLET BY MOUTH EVERY DAY IN THE MORNING 22   Jana Broussard MD   baclofen (LIORESAL) 10 MG tablet TAKE 1 TABLET (10 MG TOTAL) BY MOUTH 3 TIMES A DAY. 1 BY MOUTH TWICE A DAY AS NEEDED HEADACHE 21   Historical Provider, MD   EMGALITY 120 MG/ML SOAJ  10/1/21   Historical Provider, MD   butalbital-acetaminophen-caffeine (FIORICET, ESGIC) -40 MG per tablet Take 1 tablet by mouth every 6 hours as needed for Headaches 10/8/21   Abhinav Louis MD   SUMAtriptan (IMITREX) 100 MG tablet TAKE ONE TABLET BY MOUTH AT ONSET OF HEADACHE. MAY REPEAT IN 2 HOURS MAX OF 2 TABS PER 24 HOURS 8/19/21   Abhinav Louis MD   propranolol (INDERAL) 10 MG tablet Take 1 tablet by mouth 2 times daily 5/11/20   Abhinav Louis MD   primidone (MYSOLINE) 50 MG tablet Take 100 mg by mouth nightly. Historical Provider, MD       Current medications:    Current Facility-Administered Medications   Medication Dose Route Frequency Provider Last Rate Last Admin    ceFAZolin (ANCEF) 2,000 mg in sodium chloride 0.9 % 50 mL IVPB (mini-bag)  2,000 mg IntraVENous Once Rosana Ortega MD        tranexamic acid (CYKLOKAPRON) 1,000 mg in sodium chloride 0.9 % 60 mL IVPB  1,000 mg IntraVENous Once Rosana Ortega MD        lidocaine PF 1 % injection 1 mL  1 mL IntraDERmal Once PRN Ethan Orr MD        lactated ringers infusion   IntraVENous Continuous Ethan Orr  mL/hr at 11/28/22 0643 New Bag at 11/28/22 0643    sodium chloride flush 0.9 % injection 5-40 mL  5-40 mL IntraVENous 2 times per day Ethan Orr MD        sodium chloride flush 0.9 % injection 5-40 mL  5-40 mL IntraVENous PRN Ethan Orr MD        0.9 % sodium chloride infusion   IntraVENous PRN Ethan Orr MD           Allergies:     Allergies   Allergen Reactions    Heparin      Hives        Problem List:    Patient Active Problem List   Diagnosis Code    Hypothyroid E03.9    TMJ syndrome M26.629    Depression, major F32.9    Anxiety disorder F41.9    Headache R51.9    Migraine G43.909    URI, acute J06.9    Rib cage region somatic dysfunction M99.08    Abdominal pain R10.9    Rectal bleed K62.5    Constipation K59.00    Urinary incontinence R32    Class 1 obesity due to excess calories without serious comorbidity with body mass index (BMI) of 30.0 to 30.9 in adult E66.09, Z68.30    Panic attacks F41.0    Hyperlipidemia E78.5    Reactive hypertension I10    Benign essential HTN I10    Class 1 obesity due to excess calories without serious comorbidity with body mass index (BMI) of 31.0 to 31.9 in adult E66.09, Z68.31       Past Medical History:        Diagnosis Date    Anxiety     Breast cancer ([facility] [address]     at 47    Cervical disc disorder at C6-C7 level with myelopathy 2012    Cervical spondylarthritis     Class 1 obesity due to excess calories without serious comorbidity with body mass index (BMI) of 30.0 to 30.9 in adult 08/06/2018    COVID-19 01/2021    Depression     Essential tremor     Hyperlipidemia 10/08/2021    Hypothyroidism     Migraine     hx depakote, nadalol, topamax (stomach upset)    Neuropathy     Reactive hypertension 12/22/2021    TMJ (temporomandibular joint syndrome)     TMJ disease     Urinary incontinence 08/24/2016    Wears glasses        Past Surgical History:        Procedure Laterality Date    BREAST LUMPECTOMY      right at  46 yo     BREAST REDUCTION SURGERY Left 12/30/2021    LEFT BREAST REDUCTION/ performed by Aminata Jung MD at [address] Right 12/30/2021    RIGHT BREAST FAT GRAFTING performed by Aminata Jung MD at [facility] Right 2013    SPINAL FUSION  2013    TONSILLECTOMY         Social History:    Social History     Tobacco Use    Smoking status: Never    Smokeless tobacco: Never   Substance Use Topics    Alcohol use: Not Currently                                Counseling given: Not Answered      Vital Signs (Current):   Vitals:    11/22/22 1312 11/28/22 0625   BP:  117/79   Pulse:  73   Resp:  16   Temp:  99.3 °F (37.4 °C)   TempSrc:  Temporal   SpO2:  97%   Weight: 170 lb (77.1 kg) 174 lb (78.9 kg)   Height: 5' 5\" (1.651 m) 5' 5\" (1.651 m)                                              BP Readings from Last 3 Encounters:   11/28/22 117/79   11/02/22 134/88   09/12/22 135/80       NPO Status: Time of last liquid consumption: 2300                        Time of last solid consumption: 2300                        Date of last liquid consumption: 11/27/22                        Date of last solid food consumption: 11/27/22    BMI:   Wt Readings from Last 3 Encounters:   11/28/22 174 lb (78.9 kg)   11/02/22 176 lb 4.8 oz (80 kg)   09/12/22 177 lb (80.3 kg)     Body mass index is 28.96 kg/m². CBC:   Lab Results   Component Value Date/Time    WBC 6.9 11/02/2022 09:49 AM    RBC 5.67 11/02/2022 09:49 AM    HGB 15.7 11/02/2022 09:49 AM    HCT 49.2 11/02/2022 09:49 AM    MCV 86.8 11/02/2022 09:49 AM    RDW 15.1 11/02/2022 09:49 AM     11/02/2022 09:49 AM       CMP:   Lab Results   Component Value Date/Time     11/01/2022 03:36 PM    K 5.0 11/01/2022 03:36 PM     11/01/2022 03:36 PM    CO2 25 11/01/2022 03:36 PM    BUN 14 11/01/2022 03:36 PM    CREATININE 1.0 11/01/2022 03:36 PM    GFRAA >60 12/22/2021 07:53 AM    AGRATIO 1.8 11/01/2022 03:36 PM    LABGLOM >60 11/01/2022 03:36 PM    GLUCOSE 125 11/01/2022 03:36 PM    PROT 7.7 11/01/2022 03:36 PM    CALCIUM 9.9 11/01/2022 03:36 PM    BILITOT <0.2 11/01/2022 03:36 PM    ALKPHOS 68 11/01/2022 03:36 PM    AST 17 11/01/2022 03:36 PM    ALT 21 11/01/2022 03:36 PM       POC Tests: No results for input(s): POCGLU, POCNA, POCK, POCCL, POCBUN, POCHEMO, POCHCT in the last 72 hours.     Coags:   Lab Results   Component Value Date/Time    PROTIME 12.8 11/03/2022 08:15 AM    INR 0.97 11/03/2022 08:15 AM    APTT 30.7 11/03/2022 08:15 AM       HCG (If Applicable):   Lab Results   Component Value Date    PREGTESTUR Negative 11/28/2022        ABGs: No results found for: PHART, PO2ART, SOW1KVE, RCF1TZQ, BEART, A2BNMBBU     Type & Screen (If Applicable):  No results found for: LABABO, LABRH    Drug/Infectious Status (If Applicable):  No results found for: HIV, HEPCAB    COVID-19 Screening (If Applicable): No results found for: COVID19        Anesthesia Evaluation  Patient summary reviewed and Nursing notes reviewed no history of anesthetic complications:   Airway: Mallampati: II  TM distance: <3 FB   Neck ROM: full  Mouth opening: > = 3 FB   Dental: normal exam         Pulmonary: breath sounds clear to auscultation      (-) not a current smoker (never)                           Cardiovascular:  Exercise tolerance: good (>4 METS),   (+) hypertension: moderate,     (-) past MI    NYHA Classification: II    Rhythm: regular  Rate: normal           Beta Blocker:  Dose within 24 Hrs         Neuro/Psych:                ROS comment: C6/7   Right sided plate   GI/Hepatic/Renal:        (-) GERD       Endo/Other:    (+) hypothyroidism::., malignancy/cancer (breast right ca   at age 47    chemo/radiation   ). Abdominal:             Vascular: negative vascular ROS. Other Findings:           Anesthesia Plan      general     ASA 3       Induction: intravenous. MIPS: Prophylactic antiemetics administered. Anesthetic plan and risks discussed with patient and spouse. Plan discussed with CRNA.     Attending anesthesiologist reviewed and agrees with Preprocedure content                Danielle Magallanes DO   11/28/2022

## 2022-11-29 VITALS
HEART RATE: 94 BPM | OXYGEN SATURATION: 90 % | SYSTOLIC BLOOD PRESSURE: 95 MMHG | HEIGHT: 65 IN | DIASTOLIC BLOOD PRESSURE: 62 MMHG | TEMPERATURE: 98 F | WEIGHT: 174 LBS | BODY MASS INDEX: 28.99 KG/M2 | RESPIRATION RATE: 18 BRPM

## 2022-11-29 PROBLEM — Z41.1 ELECTIVE PROCEDURE FOR UNACCEPTABLE COSMETIC APPEARANCE: Status: ACTIVE | Noted: 2022-11-29

## 2022-11-29 PROCEDURE — 6360000002 HC RX W HCPCS: Performed by: SURGERY

## 2022-11-29 PROCEDURE — MISCPNC PLASTICS VISIT NO CHARGE: Performed by: SURGERY

## 2022-11-29 PROCEDURE — 2580000003 HC RX 258: Performed by: STUDENT IN AN ORGANIZED HEALTH CARE EDUCATION/TRAINING PROGRAM

## 2022-11-29 PROCEDURE — 6370000000 HC RX 637 (ALT 250 FOR IP): Performed by: SURGERY

## 2022-11-29 PROCEDURE — G0378 HOSPITAL OBSERVATION PER HR: HCPCS

## 2022-11-29 PROCEDURE — 6360000002 HC RX W HCPCS: Performed by: STUDENT IN AN ORGANIZED HEALTH CARE EDUCATION/TRAINING PROGRAM

## 2022-11-29 PROCEDURE — 6370000000 HC RX 637 (ALT 250 FOR IP): Performed by: STUDENT IN AN ORGANIZED HEALTH CARE EDUCATION/TRAINING PROGRAM

## 2022-11-29 PROCEDURE — 2580000003 HC RX 258: Performed by: SURGERY

## 2022-11-29 PROCEDURE — 6370000000 HC RX 637 (ALT 250 FOR IP): Performed by: NURSE PRACTITIONER

## 2022-11-29 PROCEDURE — 96372 THER/PROPH/DIAG INJ SC/IM: CPT

## 2022-11-29 RX ORDER — OXYCODONE HYDROCHLORIDE 5 MG/1
5 TABLET ORAL EVERY 6 HOURS PRN
Qty: 28 TABLET | Refills: 0 | Status: SHIPPED | OUTPATIENT
Start: 2022-11-29 | End: 2022-12-06

## 2022-11-29 RX ORDER — ACETAMINOPHEN 325 MG/1
650 TABLET ORAL EVERY 6 HOURS
Status: DISCONTINUED | OUTPATIENT
Start: 2022-11-29 | End: 2022-11-29 | Stop reason: HOSPADM

## 2022-11-29 RX ORDER — CEPHALEXIN 500 MG/1
500 CAPSULE ORAL 4 TIMES DAILY
Qty: 40 CAPSULE | Refills: 0 | Status: SHIPPED | OUTPATIENT
Start: 2022-11-29 | End: 2022-11-29 | Stop reason: SDUPTHER

## 2022-11-29 RX ORDER — METHOCARBAMOL 750 MG/1
750 TABLET, FILM COATED ORAL 4 TIMES DAILY PRN
Qty: 40 TABLET | Refills: 0 | Status: SHIPPED | OUTPATIENT
Start: 2022-11-29 | End: 2022-12-09

## 2022-11-29 RX ORDER — HEPARIN SODIUM 5000 [USP'U]/ML
5000 INJECTION, SOLUTION INTRAVENOUS; SUBCUTANEOUS ONCE
Status: COMPLETED | OUTPATIENT
Start: 2022-11-29 | End: 2022-11-29

## 2022-11-29 RX ORDER — CEPHALEXIN 500 MG/1
500 CAPSULE ORAL 4 TIMES DAILY
Qty: 40 CAPSULE | Refills: 0 | Status: SHIPPED | OUTPATIENT
Start: 2022-11-29 | End: 2022-12-09

## 2022-11-29 RX ORDER — OXYCODONE HYDROCHLORIDE 5 MG/1
5 TABLET ORAL EVERY 6 HOURS PRN
Qty: 28 TABLET | Refills: 0 | Status: SHIPPED | OUTPATIENT
Start: 2022-11-29 | End: 2022-11-29 | Stop reason: SDUPTHER

## 2022-11-29 RX ORDER — METHOCARBAMOL 750 MG/1
750 TABLET, FILM COATED ORAL 4 TIMES DAILY
Status: DISCONTINUED | OUTPATIENT
Start: 2022-11-29 | End: 2022-11-29 | Stop reason: HOSPADM

## 2022-11-29 RX ADMIN — LEVOTHYROXINE SODIUM 200 MCG: 0.2 TABLET ORAL at 08:24

## 2022-11-29 RX ADMIN — OXYCODONE 10 MG: 5 TABLET ORAL at 06:22

## 2022-11-29 RX ADMIN — PANTOPRAZOLE SODIUM 40 MG: 40 TABLET, DELAYED RELEASE ORAL at 06:22

## 2022-11-29 RX ADMIN — OXYCODONE 10 MG: 5 TABLET ORAL at 01:47

## 2022-11-29 RX ADMIN — MORPHINE SULFATE 2 MG: 2 INJECTION, SOLUTION INTRAMUSCULAR; INTRAVENOUS at 02:55

## 2022-11-29 RX ADMIN — CEFAZOLIN 2000 MG: 2 INJECTION, POWDER, FOR SOLUTION INTRAMUSCULAR; INTRAVENOUS at 08:37

## 2022-11-29 RX ADMIN — BACITRACIN: 500 OINTMENT TOPICAL at 07:30

## 2022-11-29 RX ADMIN — SODIUM CHLORIDE, PRESERVATIVE FREE 10 ML: 5 INJECTION INTRAVENOUS at 08:28

## 2022-11-29 RX ADMIN — ACETAMINOPHEN 650 MG: 325 TABLET ORAL at 06:22

## 2022-11-29 RX ADMIN — ACETAMINOPHEN 650 MG: 325 TABLET ORAL at 11:22

## 2022-11-29 RX ADMIN — HEPARIN SODIUM 5000 UNITS: 5000 INJECTION INTRAVENOUS; SUBCUTANEOUS at 08:25

## 2022-11-29 RX ADMIN — ESCITALOPRAM OXALATE 20 MG: 20 TABLET ORAL at 08:25

## 2022-11-29 RX ADMIN — METHOCARBAMOL 750 MG: 750 TABLET ORAL at 07:30

## 2022-11-29 ASSESSMENT — PAIN SCALES - GENERAL
PAINLEVEL_OUTOF10: 7
PAINLEVEL_OUTOF10: 3
PAINLEVEL_OUTOF10: 4
PAINLEVEL_OUTOF10: 6
PAINLEVEL_OUTOF10: 8
PAINLEVEL_OUTOF10: 8

## 2022-11-29 ASSESSMENT — PAIN DESCRIPTION - LOCATION
LOCATION: ABDOMEN
LOCATION: ABDOMEN

## 2022-11-29 ASSESSMENT — PAIN DESCRIPTION - ORIENTATION
ORIENTATION: MID;LOWER
ORIENTATION: MID;UPPER

## 2022-11-29 ASSESSMENT — PAIN DESCRIPTION - DESCRIPTORS
DESCRIPTORS: ACHING
DESCRIPTORS: SPASM

## 2022-11-29 NOTE — PROGRESS NOTES
Plastic Surgery Daily Progress Note      CC: Hx of breast cancer    SUBJECTIVE:  Patient rested well overnight with no acute events. Pain controlled, states she has some cramping. Denies N/V. Able to ambulate to bathroom without issue. Voided after ortiz removal.    ROS:   A 14 point review of systems was conducted, significant findings as noted above. All other systems negative. OBJECTIVE:    PHYSICAL EXAM:  Vitals:    11/29/22 0000 11/29/22 0001 11/29/22 0255 11/29/22 0622   BP: 109/68  105/65 110/69   Pulse: 83  95 88   Resp: 18  18 18   Temp: 98.1 °F (36.7 °C)  98.1 °F (36.7 °C) 98.1 °F (36.7 °C)   TempSrc: Oral  Oral Oral   SpO2: (!) 88% 95% 95% 95%   Weight:       Height:           General appearance: alert, appears stated age, and cooperative  Chest/Lungs:  unlabored on RA , right breast with steristrip from fat grafting site, post-op bra on. Heart: RRR, no pedal edema   Abdomen:  appropriately tender, incision closed with sutures, foam and abdominal binder in place, no ecchymosis or hematoma  Umbilicus: intact and viable with no necrosis or hematoma, bacitracin to suture line  Bilateral abdominal CA drains dark sanguinous output    ASSESSMENT & PLAN:   Pt is a 56y.o. year old female s/p Right breast fat grafting, Abdominoplasty with rectus plication and umbilical transposition, POD #1    - Continue general diet  - Continue oral pain medications  - Up and out of bed with ambulation  - Umbilical dressing removed and bacitracin applied to suture line  - CA drain teaching today  - Continue abdominal binder  - Likely DC later today    Lulú Mohr DO  11/29/22  6:51 AM  [phone]    I saw and independently examined the patient today. I agree with the history of present illness, past medical/surgical histories, family history, social history, medication list and allergies as listed. The review of systems is as noted above. My physical exam confirms the findings listed above.  Review of labs, pathology reports, radiology reports and medical records confirm the findings noted above. I edited the note where appropriate in italics, strikethrough font, or underline. Doing well. Pain control satisfactory. Drains serosang. No hematoma. DC home today.     Watson Tracey MD  [address] [place] [address] [address] [facility]  [phone]  11/29/22

## 2022-11-29 NOTE — PROGRESS NOTES
No acute events throughout shift, VSS w/ BP on lower end. Dressing remains CDI. Pain managed per Mar. Voiding adequately. Tolerated diet and PO intake.

## 2022-12-01 ENCOUNTER — TELEPHONE (OUTPATIENT)
Dept: FAMILY MEDICINE CLINIC | Age: 57
End: 2022-12-01

## 2022-12-01 NOTE — TELEPHONE ENCOUNTER
Care Transitions Initial Follow Up Call    Outreach made within 2 business days of discharge: Yes    Patient: Wu Beckwith Patient : 1965   MRN: [id number]  Reason for Admission: There are no discharge diagnoses documented for the most recent discharge. Discharge Date: 22       Spoke with: patient    Discharge department/facility: [facility]    TCM Interactive Patient Contact:  Was patient able to fill all prescriptions: Yes  Was patient instructed to bring all medications to the follow-up visit: Yes  Is patient taking all medications as directed in the discharge summary?  Yes  Does patient understand their discharge instructions: Yes  Does patient have questions or concerns that need addressed prior to 7-14 day follow up office visit: no    Patient will call back to schedule appointment with PCP within 7-14 days after seeing the specialist.    Follow Up  Future Appointments   Date Time Provider Leeanna Armstrong   2022  8:30 AM HOLLIE Howell - CNP PLASTICS/REC [facility]       Chadd Sena MA

## 2022-12-02 ENCOUNTER — TELEPHONE (OUTPATIENT)
Dept: FAMILY MEDICINE CLINIC | Age: 57
End: 2022-12-02

## 2022-12-02 NOTE — TELEPHONE ENCOUNTER
Care Transitions Initial Follow Up Call    Outreach made within 2 business days of discharge: Yes    Patient: Reid Rao Patient : 1965   MRN: [id number]  Reason for Admission: There are no discharge diagnoses documented for the most recent discharge. Discharge Date: 22       Spoke with: PATIENT    Discharge department/facility: [facility] Interactive Patient Contact:  Was patient able to fill all prescriptions: Yes  Was patient instructed to bring all medications to the follow-up visit: Yes  Is patient taking all medications as directed in the discharge summary? Yes  Does patient understand their discharge instructions: Yes  Does patient have questions or concerns that need addressed prior to 7-14 day follow up office visit: no    Patient will call back to schedule an appointment with PCP within 7-14 days.   She has a follow up with the surgeon on 2022      Follow Up  Future Appointments   Date Time Provider Leeanna Armstrong   2022  8:30 AM HOLLIE Whittington - CNP PLASTICS/REC LILI Gonzalez MA

## 2022-12-05 ENCOUNTER — OFFICE VISIT (OUTPATIENT)
Dept: SURGERY | Age: 57
End: 2022-12-05

## 2022-12-05 VITALS
DIASTOLIC BLOOD PRESSURE: 80 MMHG | OXYGEN SATURATION: 99 % | HEIGHT: 65 IN | WEIGHT: 174 LBS | SYSTOLIC BLOOD PRESSURE: 122 MMHG | BODY MASS INDEX: 28.99 KG/M2 | HEART RATE: 69 BPM | RESPIRATION RATE: 19 BRPM

## 2022-12-05 DIAGNOSIS — G89.18 POST-OP PAIN: Primary | ICD-10-CM

## 2022-12-05 PROCEDURE — 99024 POSTOP FOLLOW-UP VISIT: CPT

## 2022-12-05 RX ORDER — DIAZEPAM 5 MG/1
5 TABLET ORAL EVERY 8 HOURS PRN
Qty: 15 TABLET | Refills: 0 | Status: SHIPPED | OUTPATIENT
Start: 2022-12-05 | End: 2022-12-15

## 2022-12-05 NOTE — PROGRESS NOTES
[facility]    PROCEDURE: 1) Right breast fat grafting                          2) Abdominoplasty with rectus plication and umbilical transposition     DATE: 11/25/22    Lorri Powell has been recovering well since her procedure. Pain has been poorly controlled with the prescribed pain management regimen. She feels like the muscle relaxer is not helping. She would prefer to try Valium, informed her she cannot take both at the same time. EXAM    /80   Pulse 69   Resp 19   Ht 5' 5\" (1.651 m)   Wt 174 lb (78.9 kg)   LMP 02/17/2015 Comment: Spotting   SpO2 99%   BMI 28.96 kg/m²       GEN: NAD   BREAST: Incision site healing appropriately. No hematoma/seroma. ABD: Incision site healing appropriately. No hematoma/seroma. Drains serosang. IMP: 56 y. o.female s/p right breast fat grafting with abdominoplasty   PLAN: Doing well overall. Will have patient return in 1 week for potential drain removal. Will send in a script for valium.      Cyndie Anthony, APRN - [id number] [facility] Reconstruct[facility] Surgery  [phone]  12/05/22

## 2022-12-06 DIAGNOSIS — M79.2 NEUROLOGICAL PAIN DISORDER: ICD-10-CM

## 2022-12-07 RX ORDER — AMITRIPTYLINE HYDROCHLORIDE 25 MG/1
TABLET, FILM COATED ORAL
Qty: 90 TABLET | Refills: 1 | Status: SHIPPED | OUTPATIENT
Start: 2022-12-07

## 2022-12-07 NOTE — TELEPHONE ENCOUNTER
Medication:   Requested Prescriptions     Pending Prescriptions Disp Refills    amitriptyline (ELAVIL) 25 MG tablet [Pharmacy Med Name: AMITRIPTYLINE HCL 25 MG TAB] 90 tablet 1     Sig: TAKE 3 TABLETS BY MOUTH AT BEDTIME        Last Filled:      Patient Phone Number: [phone] (home)     Last appt: 11/2/2022   Next appt: Visit date not found    Last OARRS:   RX Monitoring 8/6/2018   Attestation The Prescription Monitoring Report for this patient was reviewed today. Periodic Controlled Substance Monitoring Possible medication side effects, risk of tolerance/dependence & alternative treatments discussed. ;No signs of potential drug abuse or diversion identified.

## 2022-12-12 ENCOUNTER — OFFICE VISIT (OUTPATIENT)
Dept: SURGERY | Age: 57
End: 2022-12-12

## 2022-12-12 VITALS
BODY MASS INDEX: 28.96 KG/M2 | SYSTOLIC BLOOD PRESSURE: 122 MMHG | HEART RATE: 69 BPM | DIASTOLIC BLOOD PRESSURE: 80 MMHG | TEMPERATURE: 98.1 F | OXYGEN SATURATION: 98 % | WEIGHT: 174 LBS

## 2022-12-12 DIAGNOSIS — Z09 POSTOP CHECK: Primary | ICD-10-CM

## 2022-12-12 PROCEDURE — 99024 POSTOP FOLLOW-UP VISIT: CPT

## 2022-12-12 NOTE — PROGRESS NOTES
[facility]    PROCEDURE: 1) Right breast fat grafting                          2) Abdominoplasty with rectus plication and umbilical transposition     DATE: 11/25/22    Lorri Powell has been recovering well since her procedure. She states pain has been well controlled. She presents today for abdominal drain removal.    EXAM  /80   Pulse 69   Temp 98.1 °F (36.7 °C)   Wt 174 lb (78.9 kg)   LMP 02/17/2015 Comment: Spotting   SpO2 98%   BMI 28.96 kg/m²      GEN: NAD   BREAST: Incision site healing appropriately. No hematoma/seroma. ABD: Incision site healing appropriately. No hematoma/seroma. Drains serosang. IMP: 56 y. o.female s/p right breast fat grafting with abdominoplasty   PLAN: Doing well overall. Bilateral drains removed. Will follow up in 1 month to ensure wound healing.       Lulú Renteria, APRN - [id number] [facility] Reconstruct[facility] Surgery  [phone]  12/12/22

## 2023-01-05 ENCOUNTER — OFFICE VISIT (OUTPATIENT)
Dept: SURGERY | Age: 58
End: 2023-01-05

## 2023-01-05 VITALS
SYSTOLIC BLOOD PRESSURE: 149 MMHG | TEMPERATURE: 98.2 F | HEIGHT: 65 IN | BODY MASS INDEX: 29.66 KG/M2 | WEIGHT: 178 LBS | HEART RATE: 80 BPM | OXYGEN SATURATION: 99 % | DIASTOLIC BLOOD PRESSURE: 103 MMHG

## 2023-01-05 DIAGNOSIS — Z09 POSTOP CHECK: Primary | ICD-10-CM

## 2023-01-05 PROCEDURE — 99024 POSTOP FOLLOW-UP VISIT: CPT

## 2023-01-05 RX ORDER — CYCLOBENZAPRINE HCL 5 MG
5 TABLET ORAL 2 TIMES DAILY PRN
Qty: 10 TABLET | Refills: 0 | Status: SHIPPED | OUTPATIENT
Start: 2023-01-05 | End: 2023-01-15

## 2023-01-05 NOTE — PROGRESS NOTES
[facility]    PROCEDURE: 1) Right breast fat grafting                          2) Abdominoplasty with rectus plication and umbilical transposition     DATE: 11/25/22    Lorri Powell has been recovering well since her procedure. She states pain has been well controlled. She presents today for her 6 week post op check. Since her last evaluation she reports she is having a lot of abdominal pain still, especially at night when she tries to sleep or turn over. She states she also still feels swollen in her abdomen. She denies any issues with passing flatulence or bowel movements. EXAM  BP (!) 149/103   Pulse 80   Temp 98.2 °F (36.8 °C)   Ht 5' 5\" (1.651 m)   Wt 178 lb (80.7 kg)   LMP 02/17/2015 Comment: Spotting   SpO2 99%   BMI 29.62 kg/m²      GEN: NAD   BREAST: Incision site healing appropriately. No hematoma/seroma. ABD: Incision site healing appropriately. No hematoma/seroma. IMP: 56 y. o.female s/p right breast fat grafting with abdominoplasty   PLAN: Will send a script to pharmacy for a muscle relaxer to help alleviate some her abdominal pain. She will return to office in 2 weeks if she still is c/o abdominal swelling and pain we will discuss ordering imaging. We will also wait for her to return to work until re-evaluated in 2 weeks. She will call with any addition concerns in the interim.      Home Hurst, APRN - [id number] [facility] Reconstruct[facility] Surgery  [phone]  01/05/23

## 2023-01-11 DIAGNOSIS — M79.2 NEUROLOGICAL PAIN DISORDER: ICD-10-CM

## 2023-01-11 RX ORDER — BUPROPION HYDROCHLORIDE 300 MG/1
TABLET ORAL
Qty: 30 TABLET | Refills: 8 | Status: SHIPPED | OUTPATIENT
Start: 2023-01-11

## 2023-01-11 RX ORDER — ATENOLOL 25 MG/1
TABLET ORAL
Qty: 30 TABLET | Refills: 5 | Status: SHIPPED | OUTPATIENT
Start: 2023-01-11

## 2023-01-11 RX ORDER — ESCITALOPRAM OXALATE 20 MG/1
TABLET ORAL
Qty: 90 TABLET | Refills: 2 | Status: SHIPPED | OUTPATIENT
Start: 2023-01-11

## 2023-01-11 RX ORDER — AMITRIPTYLINE HYDROCHLORIDE 25 MG/1
TABLET, FILM COATED ORAL
Qty: 90 TABLET | Refills: 2 | Status: SHIPPED | OUTPATIENT
Start: 2023-01-11

## 2023-01-11 NOTE — TELEPHONE ENCOUNTER
Medication:   Requested Prescriptions     Pending Prescriptions Disp Refills    escitalopram (LEXAPRO) 20 MG tablet [Pharmacy Med Name: ESCITALOPRAM 20 MG TABLET] 30 tablet 5     Sig: TAKE 1 TABLET BY MOUTH EVERY DAY    amitriptyline (ELAVIL) 25 MG tablet [Pharmacy Med Name: AMITRIPTYLINE HCL 25 MG TAB] 90 tablet 1     Sig: TAKE 3 TABLETS BY MOUTH AT BEDTIME        Last Filled:      Patient Phone Number: [phone] (home)     Last appt: 11/2/2022   Next appt: Visit date not found    Last OARRS:   RX Monitoring 8/6/2018   Attestation The Prescription Monitoring Report for this patient was reviewed today. Periodic Controlled Substance Monitoring Possible medication side effects, risk of tolerance/dependence & alternative treatments discussed. ;No signs of potential drug abuse or diversion identified.

## 2023-01-11 NOTE — TELEPHONE ENCOUNTER
Medication:   Requested Prescriptions     Pending Prescriptions Disp Refills    atenolol (TENORMIN) 25 MG tablet [Pharmacy Med Name: ATENOLOL 25 MG TABLET] 30 tablet 5     Sig: TAKE 1 TABLET BY MOUTH EVERY DAY        Last Filled:      Patient Phone Number: [phone] (home)     Last appt: 11/2/2022   Next appt: Visit date not found    Last OARRS:   RX Monitoring 8/6/2018   Attestation The Prescription Monitoring Report for this patient was reviewed today. Periodic Controlled Substance Monitoring Possible medication side effects, risk of tolerance/dependence & alternative treatments discussed. ;No signs of potential drug abuse or diversion identified.

## 2023-01-11 NOTE — TELEPHONE ENCOUNTER
Medication:   Requested Prescriptions     Pending Prescriptions Disp Refills    buPROPion (WELLBUTRIN XL) 300 MG extended release tablet [Pharmacy Med Name: BUPROPION HCL  MG TABLET] 30 tablet 8     Sig: TAKE 1 TABLET BY MOUTH EVERY DAY IN THE MORNING        Last Filled:      Patient Phone Number: [phone] (home)     Last appt: 11/2/2022   Next appt: Visit date not found    Last OARRS:   RX Monitoring 8/6/2018   Attestation The Prescription Monitoring Report for this patient was reviewed today. Periodic Controlled Substance Monitoring Possible medication side effects, risk of tolerance/dependence & alternative treatments discussed. ;No signs of potential drug abuse or diversion identified.

## 2023-01-16 ENCOUNTER — OFFICE VISIT (OUTPATIENT)
Dept: SURGERY | Age: 58
End: 2023-01-16

## 2023-01-16 VITALS
SYSTOLIC BLOOD PRESSURE: 114 MMHG | DIASTOLIC BLOOD PRESSURE: 79 MMHG | HEART RATE: 80 BPM | TEMPERATURE: 98.8 F | OXYGEN SATURATION: 98 % | WEIGHT: 176 LBS | BODY MASS INDEX: 29.29 KG/M2

## 2023-01-16 DIAGNOSIS — Z09 POSTOP CHECK: Primary | ICD-10-CM

## 2023-01-16 PROCEDURE — 99024 POSTOP FOLLOW-UP VISIT: CPT

## 2023-01-16 NOTE — PROGRESS NOTES
[facility]    PROCEDURE: 1) Right breast fat grafting                          2) Abdominoplasty with rectus plication and umbilical transposition     DATE: 11/25/22    Lorri Powell has been recovering well since her procedure. She states pain has been well controlled. She presents today for her 6 week post op check. Since her last evaluation she reports she still has some abdominal swelling. However, she denies any abdominal pain at this time. She denies any issues with passing flatulence or bowel movements. EXAM  /79   Pulse 80   Temp 98.8 °F (37.1 °C)   Wt 176 lb (79.8 kg)   LMP 02/17/2015 Comment: Spotting   SpO2 98%   BMI 29.29 kg/m²      GEN: NAD   BREAST: Incision site healing appropriately. No hematoma/seroma. ABD: Incision site healing appropriately. No hematoma/seroma. IMP: 56 y. o.female s/p right breast fat grafting with abdominoplasty   PLAN: Doing well overall. Patient is happy with her results. Will follow up in 6 months with Dr. Alpa Causey. Will call with any additional concerns in the interim.      Zina Houston, APRN - [id number] [facility] Reconstruct[facility] Surgery  [phone]  01/16/23

## 2023-02-08 ENCOUNTER — OFFICE VISIT (OUTPATIENT)
Dept: FAMILY MEDICINE CLINIC | Age: 58
End: 2023-02-08
Payer: COMMERCIAL

## 2023-02-08 VITALS
HEIGHT: 65 IN | SYSTOLIC BLOOD PRESSURE: 134 MMHG | DIASTOLIC BLOOD PRESSURE: 84 MMHG | WEIGHT: 173 LBS | BODY MASS INDEX: 28.82 KG/M2 | RESPIRATION RATE: 16 BRPM | TEMPERATURE: 98.6 F | OXYGEN SATURATION: 98 % | HEART RATE: 71 BPM

## 2023-02-08 DIAGNOSIS — E03.9 HYPOTHYROIDISM, UNSPECIFIED TYPE: ICD-10-CM

## 2023-02-08 DIAGNOSIS — E78.5 HYPERLIPIDEMIA, UNSPECIFIED HYPERLIPIDEMIA TYPE: ICD-10-CM

## 2023-02-08 DIAGNOSIS — E66.3 OVERWEIGHT (BMI 25.0-29.9): ICD-10-CM

## 2023-02-08 DIAGNOSIS — I10 BENIGN ESSENTIAL HTN: Primary | ICD-10-CM

## 2023-02-08 PROCEDURE — 3074F SYST BP LT 130 MM HG: CPT | Performed by: FAMILY MEDICINE

## 2023-02-08 PROCEDURE — 99214 OFFICE O/P EST MOD 30 MIN: CPT | Performed by: FAMILY MEDICINE

## 2023-02-08 PROCEDURE — 3078F DIAST BP <80 MM HG: CPT | Performed by: FAMILY MEDICINE

## 2023-02-08 RX ORDER — PHENTERMINE HYDROCHLORIDE 37.5 MG/1
37.5 TABLET ORAL
Qty: 30 TABLET | Refills: 0 | Status: SHIPPED | OUTPATIENT
Start: 2023-02-08 | End: 2023-03-10

## 2023-02-08 ASSESSMENT — ENCOUNTER SYMPTOMS: ABDOMINAL PAIN: 0

## 2023-02-08 NOTE — PROGRESS NOTES
Subjective:      Patient ID: Misti Ramirez is a 56 y.o. female. Weight Management  This is a recurrent problem. The current episode started more than 1 year ago. The problem occurs constantly. The problem has been waxing and waning. Pertinent negatives include no abdominal pain, chest pain, diaphoresis, fatigue, headaches, nausea, numbness or weakness. Nothing aggravates the symptoms. Treatments tried: tummy tock, high protein diet (shakes bid) more veggies, lean meat. The treatment provided mild relief. Exercise 3 times week   Weight training, and walking a mile (20 min /)     Hypertension    bp at home not checked  , denies  ha, visual changes, syncope, presyncope, cp, sob, palpitations, edema, ventura, orthopnea, pnd,  Compliant with medication, no secondary effects     Review of Systems   Constitutional:  Negative for diaphoresis and fatigue. Cardiovascular:  Negative for chest pain. Gastrointestinal:  Negative for abdominal pain and nausea. Neurological:  Negative for weakness, numbness and headaches. Objective:  Blood pressure 134/84, pulse 71, temperature 98.6 °F (37 °C), temperature source Temporal, resp. rate 16, height 5' 5\" (1.651 m), weight 173 lb (78.5 kg), last menstrual period 02/17/2015, SpO2 98 %, not currently breastfeeding. Physical Exam  Vitals and nursing note reviewed. Constitutional:       General: She is not in acute distress. Appearance: Normal appearance. She is well-developed. She is not ill-appearing, toxic-appearing or diaphoretic. Comments: Overweight    HENT:      Head: Normocephalic. Eyes:      General: No scleral icterus. Extraocular Movements: Extraocular movements intact. Conjunctiva/sclera: Conjunctivae normal.      Pupils: Pupils are equal, round, and reactive to light. Neck:      Thyroid: No thyromegaly. Vascular: No JVD. Comments: No carotid bruits  Cardiovascular:      Rate and Rhythm: Normal rate and regular rhythm. Pulses: Normal pulses. Carotid pulses are 2+ on the right side and 2+ on the left side. Heart sounds: Normal heart sounds. No murmur heard. No friction rub. No gallop. Comments: No edema    Pulmonary:      Effort: Pulmonary effort is normal. No respiratory distress. Breath sounds: Normal breath sounds. No stridor. No wheezing, rhonchi or rales. Chest:      Chest wall: No tenderness. Musculoskeletal:      Cervical back: Normal range of motion and neck supple. Right lower leg: No edema. Left lower leg: No edema. Lymphadenopathy:      Cervical: No cervical adenopathy. Skin:     General: Skin is warm. Capillary Refill: Capillary refill takes less than 2 seconds. Coloration: Skin is not pale. Neurological:      General: No focal deficit present. Mental Status: She is alert and oriented to person, place, and time. Mental status is at baseline. Cranial Nerves: No cranial nerve deficit. Motor: No weakness or abnormal muscle tone. Gait: Gait normal.      Deep Tendon Reflexes: Reflexes are normal and symmetric. Psychiatric:         Mood and Affect: Mood normal.         Thought Content: Thought content normal.       Assessment:       Diagnosis Orders   1. Benign essential HTN        2. Overweight (BMI 25.0-29.9)        3. Hyperlipidemia, unspecified hyperlipidemia type        4. Hypothyroidism, unspecified type                        Plan:      Stable  Continue Atenolol. Encourage compliance with medication, life style changes    2. Unable to continue to loss weight   Resume tx with Adipex (she lost 15 lb last treatment )   Diet counseling   Continue to exercise. Fu in 1 mo     3. Low fat diet discussed,   Re check lipids on May     4.  Tsh stable  Continue tx             Gamal Beatty MD

## 2023-02-09 ASSESSMENT — ENCOUNTER SYMPTOMS: NAUSEA: 0

## 2023-03-09 ENCOUNTER — OFFICE VISIT (OUTPATIENT)
Dept: FAMILY MEDICINE CLINIC | Age: 58
End: 2023-03-09
Payer: COMMERCIAL

## 2023-03-09 VITALS
HEIGHT: 65 IN | HEART RATE: 96 BPM | DIASTOLIC BLOOD PRESSURE: 70 MMHG | RESPIRATION RATE: 16 BRPM | TEMPERATURE: 98.3 F | BODY MASS INDEX: 28.94 KG/M2 | WEIGHT: 173.7 LBS | OXYGEN SATURATION: 98 % | SYSTOLIC BLOOD PRESSURE: 118 MMHG

## 2023-03-09 DIAGNOSIS — F41.0 PANIC ATTACKS: ICD-10-CM

## 2023-03-09 DIAGNOSIS — R51.9 NONINTRACTABLE HEADACHE, UNSPECIFIED CHRONICITY PATTERN, UNSPECIFIED HEADACHE TYPE: ICD-10-CM

## 2023-03-09 DIAGNOSIS — E66.3 OVERWEIGHT (BMI 25.0-29.9): Primary | ICD-10-CM

## 2023-03-09 PROCEDURE — 3078F DIAST BP <80 MM HG: CPT | Performed by: FAMILY MEDICINE

## 2023-03-09 PROCEDURE — 3074F SYST BP LT 130 MM HG: CPT | Performed by: FAMILY MEDICINE

## 2023-03-09 PROCEDURE — 99213 OFFICE O/P EST LOW 20 MIN: CPT | Performed by: FAMILY MEDICINE

## 2023-03-09 RX ORDER — BUTALBITAL, ACETAMINOPHEN AND CAFFEINE 50; 325; 40 MG/1; MG/1; MG/1
1 TABLET ORAL EVERY 6 HOURS PRN
Qty: 30 TABLET | Refills: 1 | Status: CANCELLED | OUTPATIENT
Start: 2023-03-09

## 2023-03-09 RX ORDER — BUTALBITAL, ACETAMINOPHEN AND CAFFEINE 50; 325; 40 MG/1; MG/1; MG/1
1 TABLET ORAL EVERY 6 HOURS PRN
Qty: 30 TABLET | Refills: 1 | Status: SHIPPED | OUTPATIENT
Start: 2023-03-09

## 2023-03-09 RX ORDER — CLONAZEPAM 1 MG/1
1 TABLET ORAL 2 TIMES DAILY PRN
Qty: 30 TABLET | Refills: 0 | Status: SHIPPED | OUTPATIENT
Start: 2023-03-09 | End: 2023-04-08

## 2023-03-09 ASSESSMENT — ENCOUNTER SYMPTOMS
VISUAL CHANGE: 0
ABDOMINAL PAIN: 0

## 2023-03-09 NOTE — PROGRESS NOTES
Subjective:      Patient ID: Janessa Pichardo is a 56 y.o. female. Weight Management  This is a chronic problem. The current episode started more than 1 year ago. The problem occurs constantly. The problem has been unchanged. Pertinent negatives include no abdominal pain, anorexia or visual change. Exacerbated by: drinks beer. Treatments tried: adipex. The treatment provided no relief (didn't lost weight). Review of Systems   Gastrointestinal:  Negative for abdominal pain and anorexia. Objective:  Blood pressure 118/70, pulse 96, temperature 98.3 °F (36.8 °C), temperature source Tympanic, resp. rate 16, height 5' 5\" (1.651 m), weight 173 lb 11.2 oz (78.8 kg), last menstrual period 02/17/2015, SpO2 98 %, not currently breastfeeding. Physical Exam  Vitals and nursing note reviewed. Constitutional:       General: She is not in acute distress. Appearance: Normal appearance. She is not ill-appearing, toxic-appearing or diaphoretic. HENT:      Head: Normocephalic and atraumatic. Pulmonary:      Effort: No respiratory distress. Musculoskeletal:      Cervical back: Normal range of motion. Neurological:      General: No focal deficit present. Mental Status: She is alert and oriented to person, place, and time. Mental status is at baseline. Psychiatric:         Mood and Affect: Mood normal.         Behavior: Behavior normal.       Assessment:       Diagnosis Orders   1. Overweight (BMI 25.0-29.9)  naltrexone-buPROPion (CONTRAVE) 8-90 MG per extended release tablet              Plan:      Life style counseling  Adipex is not recommended (did not lost weight )   Trial with Contrave.      Fu 2 mo           Juan Miller MD

## 2023-03-10 ENCOUNTER — TELEPHONE (OUTPATIENT)
Dept: FAMILY MEDICINE CLINIC | Age: 58
End: 2023-03-10

## 2023-04-17 DIAGNOSIS — M79.2 NEUROLOGICAL PAIN DISORDER: ICD-10-CM

## 2023-04-17 RX ORDER — AMITRIPTYLINE HYDROCHLORIDE 25 MG/1
TABLET, FILM COATED ORAL
Qty: 90 TABLET | Refills: 2 | Status: SHIPPED | OUTPATIENT
Start: 2023-04-17

## 2023-04-17 NOTE — TELEPHONE ENCOUNTER
Medication:   Requested Prescriptions     Pending Prescriptions Disp Refills    amitriptyline (ELAVIL) 25 MG tablet [Pharmacy Med Name: AMITRIPTYLINE HCL 25 MG TAB] 90 tablet 2     Sig: TAKE 3 TABLETS BY MOUTH AT BEDTIME        Last Filled:      Patient Phone Number: [phone] (home)     Last appt: 3/9/2023   Next appt: 5/8/2023    Last OARRS:   RX Monitoring 8/6/2018   Attestation The Prescription Monitoring Report for this patient was reviewed today. Periodic Controlled Substance Monitoring Possible medication side effects, risk of tolerance/dependence & alternative treatments discussed. ;No signs of potential drug abuse or diversion identified.

## 2023-05-08 ENCOUNTER — OFFICE VISIT (OUTPATIENT)
Dept: FAMILY MEDICINE CLINIC | Age: 58
End: 2023-05-08
Payer: COMMERCIAL

## 2023-05-08 VITALS
WEIGHT: 169.7 LBS | BODY MASS INDEX: 28.27 KG/M2 | HEART RATE: 79 BPM | OXYGEN SATURATION: 98 % | RESPIRATION RATE: 16 BRPM | DIASTOLIC BLOOD PRESSURE: 76 MMHG | SYSTOLIC BLOOD PRESSURE: 120 MMHG | TEMPERATURE: 98.2 F | HEIGHT: 65 IN

## 2023-05-08 DIAGNOSIS — E66.3 OVERWEIGHT (BMI 25.0-29.9): Primary | ICD-10-CM

## 2023-05-08 PROCEDURE — 3078F DIAST BP <80 MM HG: CPT | Performed by: FAMILY MEDICINE

## 2023-05-08 PROCEDURE — 99213 OFFICE O/P EST LOW 20 MIN: CPT | Performed by: FAMILY MEDICINE

## 2023-05-08 PROCEDURE — 3074F SYST BP LT 130 MM HG: CPT | Performed by: FAMILY MEDICINE

## 2023-05-08 RX ORDER — NALTREXONE HYDROCHLORIDE 50 MG/1
25 TABLET, FILM COATED ORAL DAILY
Qty: 45 TABLET | Refills: 1 | Status: SHIPPED | OUTPATIENT
Start: 2023-05-08

## 2023-05-08 NOTE — PROGRESS NOTES
Subjective:      Patient ID: Alta Nelson is a 66 y.o. female. Weight Management  This is a chronic problem. The current episode started more than 1 year ago. The problem occurs constantly. The problem has been gradually improving. Nothing aggravates the symptoms. Treatments tried: diet changes, exercise every nunu,Naltrexone, The treatment provided moderate relief. Still drinks beer on weekend   Other worsening factors: estradiol, ssri     Review of Systems    Objective:  Blood pressure 120/76, pulse 79, temperature 98.2 °F (36.8 °C), temperature source Tympanic, resp. rate 16, height 5' 5\" (1.651 m), weight 169 lb 11.2 oz (77 kg), last menstrual period 02/17/2015, SpO2 98 %, not currently breastfeeding. Physical Exam  Vitals and nursing note reviewed. Constitutional:       General: She is not in acute distress. Appearance: Normal appearance. She is well-developed. She is not toxic-appearing or diaphoretic. HENT:      Head: Normocephalic. Eyes:      General: No scleral icterus. Extraocular Movements: Extraocular movements intact. Conjunctiva/sclera: Conjunctivae normal.      Pupils: Pupils are equal, round, and reactive to light. Neck:      Thyroid: No thyromegaly. Vascular: No JVD. Comments: No carotid bruits  Cardiovascular:      Rate and Rhythm: Normal rate and regular rhythm. Pulses: Normal pulses. Carotid pulses are 2+ on the right side and 2+ on the left side. Heart sounds: Normal heart sounds. No murmur heard. No friction rub. No gallop. Comments: No edema    Pulmonary:      Effort: Pulmonary effort is normal. No respiratory distress. Breath sounds: No stridor. No wheezing, rhonchi or rales. Chest:      Chest wall: No tenderness. Musculoskeletal:      Cervical back: Normal range of motion and neck supple. Lymphadenopathy:      Cervical: No cervical adenopathy. Skin:     General: Skin is warm.    Neurological:      Mental Status:

## 2023-05-16 NOTE — PROGRESS NOTES
[facility]    PROCEDURE: 1) Right breast fat grafting                           2) Abdominoplasty with rectus plication and umbilical transposition  DATE: 11/28/22    Lorri Powell has been recovering satisfactorily since her procedure. Pain has been well controlled without pain medications. She has been happy with her results thus far and presents for evaluation regarding some additional contouring procedures.     PMHx:   Past Medical History:   Diagnosis Date    Anxiety     Breast cancer ([address]     at 47    Cervical disc disorder at C6-C7 level with myelopathy 2012    Cervical spondylarthritis     Class 1 obesity due to excess calories without serious comorbidity with body mass index (BMI) of 30.0 to 30.9 in adult 08/06/2018    COVID-19 01/2021    Depression     Essential tremor     Hyperlipidemia 10/08/2021    Hypothyroidism     Migraine     hx depakote, nadalol, topamax (stomach upset)    Neuropathy     Reactive hypertension 12/22/2021    TMJ (temporomandibular joint syndrome)     TMJ disease     Urinary incontinence 08/24/2016    Wears glasses      PSHx:   Past Surgical History:   Procedure Laterality Date    ABDOMINOPLASTY N/A 11/28/2022    ABDOMINOPLASTY WITH RECTUS PLICATION performed by Serenity Oneil MD at [facility]      right at  46 yo     BREAST REDUCTION SURGERY Left 12/30/2021    LEFT BREAST REDUCTION/ performed by Serenity Oneil MD at [facility] Right 12/30/2021    RIGHT BREAST FAT GRAFTING performed by Serenity Oneil MD at [facility] [address] Right 11/28/2022    RIGHT BREAST FAT GRAFTING AND performed by Serenity Oneil MD at [facility] Right 2013    FISH STEROTACTIC LOC BREAST BIOPSY LEFT Left 2/3/2023    FISH STEROTACTIC LOC BREAST BIOPSY LEFT    FISH STEROTACTIC LOC BREAST BIOPSY LEFT Left 2/3/2023    FISH STEROTACTIC LOC BREAST BIOPSY LEFT    SPINAL FUSION  2013    TONSILLECTOMY       Allergy:   Allergies   Allergen

## 2023-05-17 ENCOUNTER — OFFICE VISIT (OUTPATIENT)
Dept: SURGERY | Age: 58
End: 2023-05-17
Payer: COMMERCIAL

## 2023-05-17 VITALS
BODY MASS INDEX: 29.16 KG/M2 | TEMPERATURE: 97.5 F | OXYGEN SATURATION: 97 % | DIASTOLIC BLOOD PRESSURE: 66 MMHG | HEIGHT: 65 IN | HEART RATE: 87 BPM | SYSTOLIC BLOOD PRESSURE: 124 MMHG | WEIGHT: 175 LBS

## 2023-05-17 DIAGNOSIS — Z09 POSTOP CHECK: Primary | ICD-10-CM

## 2023-05-17 DIAGNOSIS — L98.7 EXCESS SKIN OF ABDOMEN: ICD-10-CM

## 2023-05-17 DIAGNOSIS — E88.1 LIPODYSTROPHY: ICD-10-CM

## 2023-05-17 PROCEDURE — 3078F DIAST BP <80 MM HG: CPT | Performed by: SURGERY

## 2023-05-17 PROCEDURE — 99213 OFFICE O/P EST LOW 20 MIN: CPT | Performed by: SURGERY

## 2023-05-17 PROCEDURE — 3074F SYST BP LT 130 MM HG: CPT | Performed by: SURGERY

## 2023-05-18 ENCOUNTER — TELEPHONE (OUTPATIENT)
Dept: SURGERY | Age: 58
End: 2023-05-18

## 2023-05-18 NOTE — TELEPHONE ENCOUNTER
The patient was in the office to see Dr. Garth Silva yesterday. PLAN: Continuing to do well from her abdominoplasty. She is happy with her results. We discussed options including liposuction to the back for improvement in contouring. Will provide pricing and then return in 3 months for evaluation to determine if she would like to proceed. I received a surgery letter. [id number] Liposuction Back Extensive  Physician Fee $2250.00  Hospital Fee $3000.00  Total $5250.00  Does Not Include Anesthesia     I spoke with the patient at the home number listed to provide the cosmetic pricing above. She is scheduled to follow up with Dr. Garth Silva 8-. I will remove the surgery letter. I will close this phone note.

## 2023-05-22 RX ORDER — OMEPRAZOLE 20 MG/1
CAPSULE, DELAYED RELEASE ORAL
Qty: 30 CAPSULE | Refills: 5 | Status: SHIPPED | OUTPATIENT
Start: 2023-05-22

## 2023-05-22 NOTE — TELEPHONE ENCOUNTER
Medication:   Requested Prescriptions     Pending Prescriptions Disp Refills    omeprazole (PRILOSEC) 20 MG delayed release capsule [Pharmacy Med Name: OMEPRAZOLE DR 20 MG CAPSULE] 30 capsule 5     Sig: TAKE 1 CAPSULE BY MOUTH EVERY DAY     Last Filled:  11/7/22    Last appt: 5/8/2023   Next appt: 11/7/2023    Last OARRS:   RX Monitoring 8/6/2018   Attestation The Prescription Monitoring Report for this patient was reviewed today. Periodic Controlled Substance Monitoring Possible medication side effects, risk of tolerance/dependence & alternative treatments discussed. ;No signs of potential drug abuse or diversion identified.

## 2023-06-30 NOTE — TELEPHONE ENCOUNTER
Medication:   Requested Prescriptions     Pending Prescriptions Disp Refills    levothyroxine (SYNTHROID) 200 MCG tablet [Pharmacy Med Name: LEVOTHYROXINE 200 MCG TABLET] 30 tablet 5     Sig: TAKE 1 TABLET BY MOUTH EVERY DAY        Last Filled:      Patient Phone Number: [phone] (home)     Last appt: 7/18/2022   Next appt: 11/2/2022    Last OARRS:   RX Monitoring 8/6/2018   Attestation The Prescription Monitoring Report for this patient was reviewed today. Periodic Controlled Substance Monitoring Possible medication side effects, risk of tolerance/dependence & alternative treatments discussed. ;No signs of potential drug abuse or diversion identified. Show Eye Clamp Variable: Yes

## 2023-07-17 DIAGNOSIS — M79.2 NEUROLOGICAL PAIN DISORDER: ICD-10-CM

## 2023-07-17 RX ORDER — AMITRIPTYLINE HYDROCHLORIDE 25 MG/1
TABLET, FILM COATED ORAL
Qty: 90 TABLET | Refills: 2 | Status: SHIPPED | OUTPATIENT
Start: 2023-07-17

## 2023-07-17 RX ORDER — ATENOLOL 25 MG/1
TABLET ORAL
Qty: 30 TABLET | Refills: 5 | Status: SHIPPED | OUTPATIENT
Start: 2023-07-17

## 2023-07-17 NOTE — TELEPHONE ENCOUNTER
Medication:   Requested Prescriptions     Pending Prescriptions Disp Refills    atenolol (TENORMIN) 25 MG tablet [Pharmacy Med Name: ATENOLOL 25 MG TABLET] 30 tablet 5     Sig: TAKE 1 TABLET BY MOUTH EVERY DAY    amitriptyline (ELAVIL) 25 MG tablet [Pharmacy Med Name: AMITRIPTYLINE HCL 25 MG TAB] 90 tablet 2     Sig: TAKE 3 TABLETS BY MOUTH AT BEDTIME        Last Filled:      Patient Phone Number: [phone] (home)     Last appt: 5/8/2023   Next appt: 11/7/2023    Last OARRS:   RX Monitoring 8/6/2018   Attestation The Prescription Monitoring Report for this patient was reviewed today. Periodic Controlled Substance Monitoring Possible medication side effects, risk of tolerance/dependence & alternative treatments discussed. ;No signs of potential drug abuse or diversion identified.

## 2023-08-15 NOTE — PROGRESS NOTES
[facility]    PROCEDURE: 1) Right breast fat grafting                           2) Abdominoplasty with rectus plication and umbilical transposition  DATE: 11/28/22    Lorri Powell has been recovering satisfactorily since her procedure. Pain has been well controlled without pain medications. She has been happy with her results thus far and presents for evaluation regarding some additional contouring procedures.     Since her last evaluation, she has been doing well overal.    PMHx:   Past Medical History:   Diagnosis Date    Anxiety     Breast cancer ([address])     at [address] [facility]    Cervical disc disorder at C6-C7 level with myelopathy 2012    Cervical spondylarthritis     Class 1 obesity due to excess calories without serious comorbidity with body mass index (BMI) of 30.0 to 30.9 in adult 08/06/2018    COVID-19 01/2021    Depression     Essential tremor     Hyperlipidemia 10/08/2021    Hypothyroidism     Migraine     hx depakote, nadalol, topamax (stomach upset)    Neuropathy     Reactive hypertension 12/22/2021    TMJ (temporomandibular joint syndrome)     TMJ disease     Urinary incontinence 08/24/2016    Wears glasses      PSHx:   Past Surgical History:   Procedure Laterality Date    ABDOMINOPLASTY N/A 11/28/2022    ABDOMINOPLASTY WITH RECTUS PLICATION performed by Ronnie Martines MD at [facility]      right at  [address] [facility] yo     BREAST REDUCTION SURGERY Left 12/30/2021    LEFT BREAST REDUCTION/ performed by Ronnie Martines MD at [address] Right 12/30/2021    RIGHT BREAST FAT GRAFTING performed by Ronnie Martines MD at [address] Right 11/28/2022    RIGHT BREAST FAT GRAFTING AND performed by oRnnie Martines MD at [address] Right 2013    FISH STEROTACTIC LOC BREAST BIOPSY LEFT Left 2/3/2023    FISH STEROTACTIC LOC BREAST BIOPSY LEFT    FISH STEROTACTIC LOC BREAST BIOPSY LEFT Left 2/3/2023    FISH STEROTACTIC LOC BREAST BIOPSY LEFT    SPINAL FUSION  2013

## 2023-08-16 ENCOUNTER — OFFICE VISIT (OUTPATIENT)
Dept: SURGERY | Age: 58
End: 2023-08-16
Payer: COMMERCIAL

## 2023-08-16 VITALS
OXYGEN SATURATION: 98 % | HEART RATE: 82 BPM | TEMPERATURE: 98.1 F | BODY MASS INDEX: 29.09 KG/M2 | WEIGHT: 174.6 LBS | HEIGHT: 65 IN | DIASTOLIC BLOOD PRESSURE: 77 MMHG | RESPIRATION RATE: 16 BRPM | SYSTOLIC BLOOD PRESSURE: 112 MMHG

## 2023-08-16 DIAGNOSIS — Z09 POSTOP CHECK: Primary | ICD-10-CM

## 2023-08-16 DIAGNOSIS — C50.911 MALIGNANT NEOPLASM OF RIGHT FEMALE BREAST, UNSPECIFIED ESTROGEN RECEPTOR STATUS, UNSPECIFIED SITE OF BREAST (HCC): ICD-10-CM

## 2023-08-16 PROCEDURE — 99213 OFFICE O/P EST LOW 20 MIN: CPT | Performed by: SURGERY

## 2023-08-16 PROCEDURE — 3078F DIAST BP <80 MM HG: CPT | Performed by: SURGERY

## 2023-08-16 PROCEDURE — 3074F SYST BP LT 130 MM HG: CPT | Performed by: SURGERY

## 2023-10-02 DIAGNOSIS — R51.9 NONINTRACTABLE HEADACHE, UNSPECIFIED CHRONICITY PATTERN, UNSPECIFIED HEADACHE TYPE: ICD-10-CM

## 2023-10-03 RX ORDER — BUTALBITAL, ACETAMINOPHEN AND CAFFEINE 50; 325; 40 MG/1; MG/1; MG/1
1 TABLET ORAL EVERY 6 HOURS PRN
Qty: 30 TABLET | Refills: 1 | Status: SHIPPED | OUTPATIENT
Start: 2023-10-03

## 2023-10-03 NOTE — TELEPHONE ENCOUNTER
Medication:   Requested Prescriptions     Pending Prescriptions Disp Refills    butalbital-acetaminophen-caffeine (FIORICET, ESGIC) -40 MG per tablet [Pharmacy Med Name: [id number] -40] 30 tablet 1     Sig: TAKE 1 TABLET BY MOUTH EVERY 6 HOURS AS NEEDED FOR HEADACHES        Last Filled:      Patient Phone Number: [phone] (home)     Last appt: 5/8/2023   Next appt: 11/7/2023    Last OARRS:       8/6/2018    11:58 AM   RX Monitoring   Attestation The Prescription Monitoring Report for this patient was reviewed today. Periodic Controlled Substance Monitoring Possible medication side effects, risk of tolerance/dependence & alternative treatments discussed. ;No signs of potential drug abuse or diversion identified.

## 2023-10-17 DIAGNOSIS — E03.9 HYPOTHYROIDISM, UNSPECIFIED TYPE: ICD-10-CM

## 2023-10-17 DIAGNOSIS — M79.2 NEUROLOGICAL PAIN DISORDER: ICD-10-CM

## 2023-10-17 RX ORDER — BUPROPION HYDROCHLORIDE 300 MG/1
TABLET ORAL
Qty: 30 TABLET | Refills: 8 | Status: SHIPPED | OUTPATIENT
Start: 2023-10-17

## 2023-10-17 RX ORDER — LEVOTHYROXINE SODIUM 0.2 MG/1
TABLET ORAL
Qty: 30 TABLET | Refills: 11 | Status: SHIPPED | OUTPATIENT
Start: 2023-10-17

## 2023-10-17 RX ORDER — ESCITALOPRAM OXALATE 20 MG/1
TABLET ORAL
Qty: 30 TABLET | Refills: 8 | Status: SHIPPED | OUTPATIENT
Start: 2023-10-17

## 2023-10-17 RX ORDER — AMITRIPTYLINE HYDROCHLORIDE 25 MG/1
TABLET, FILM COATED ORAL
Qty: 90 TABLET | Refills: 2 | Status: SHIPPED | OUTPATIENT
Start: 2023-10-17

## 2023-10-17 NOTE — TELEPHONE ENCOUNTER
Medication:   Requested Prescriptions     Pending Prescriptions Disp Refills    amitriptyline (ELAVIL) 25 MG tablet [Pharmacy Med Name: AMITRIPTYLINE HCL 25 MG TAB] 90 tablet 2     Sig: TAKE 3 TABLETS BY MOUTH AT BEDTIME    levothyroxine (SYNTHROID) 200 MCG tablet [Pharmacy Med Name: LEVOTHYROXINE 200 MCG TABLET] 30 tablet 11     Sig: TAKE 1 TABLET BY MOUTH EVERY DAY    buPROPion (WELLBUTRIN XL) 300 MG extended release tablet [Pharmacy Med Name: BUPROPION HCL  MG TABLET] 30 tablet 8     Sig: TAKE 1 TABLET BY MOUTH EVERY DAY IN THE MORNING    escitalopram (LEXAPRO) 20 MG tablet [Pharmacy Med Name: ESCITALOPRAM 20 MG TABLET] 30 tablet 8     Sig: TAKE 1 TABLET BY MOUTH EVERY DAY        Last Filled:      Patient Phone Number: [phone] (home)     Last appt: 5/8/2023   Next appt: 11/7/2023    Last OARRS:       8/6/2018    11:58 AM   RX Monitoring   Attestation The Prescription Monitoring Report for this patient was reviewed today. Periodic Controlled Substance Monitoring Possible medication side effects, risk of tolerance/dependence & alternative treatments discussed. ;No signs of potential drug abuse or diversion identified.

## 2023-11-02 ENCOUNTER — TELEPHONE (OUTPATIENT)
Dept: FAMILY MEDICINE CLINIC | Age: 58
End: 2023-11-02

## 2023-11-02 DIAGNOSIS — Z00.00 WELL ADULT EXAM: Primary | ICD-10-CM

## 2023-11-02 NOTE — TELEPHONE ENCOUNTER
Pt states that she has physical on 11/7/23 and would like orders placed. Pt will be having them done in the morning. Orders pending.

## 2023-11-02 NOTE — TELEPHONE ENCOUNTER
----- Message from Dax Bowens sent at 11/2/2023 12:13 PM EDT -----  Subject: Referral Request    Reason for referral request? labs  Provider patient wants to be referred to(if known):     Provider Phone Number(if known):     Additional Information for Provider? pt would like a call regarding her   labs and upcoming appt  ---------------------------------------------------------------------------  --------------  Xu Sterling Tyesha    [phone]; OK to leave message on voicemail  ---------------------------------------------------------------------------  --------------

## 2023-11-03 DIAGNOSIS — Z00.00 WELL ADULT EXAM: ICD-10-CM

## 2023-11-03 LAB
ALBUMIN SERPL-MCNC: 4.3 G/DL (ref 3.4–5)
ALBUMIN/GLOB SERPL: 1.4 {RATIO} (ref 1.1–2.2)
ALP SERPL-CCNC: 63 U/L (ref 40–129)
ALT SERPL-CCNC: 20 U/L (ref 10–40)
ANION GAP SERPL CALCULATED.3IONS-SCNC: 9 MMOL/L (ref 3–16)
AST SERPL-CCNC: 22 U/L (ref 15–37)
BASOPHILS # BLD: 0 K/UL (ref 0–0.2)
BASOPHILS NFR BLD: 0.7 %
BILIRUB SERPL-MCNC: 0.3 MG/DL (ref 0–1)
BUN SERPL-MCNC: 16 MG/DL (ref 7–20)
CALCIUM SERPL-MCNC: 9.4 MG/DL (ref 8.3–10.6)
CHLORIDE SERPL-SCNC: 100 MMOL/L (ref 99–110)
CHOLEST SERPL-MCNC: 205 MG/DL (ref 0–199)
CO2 SERPL-SCNC: 29 MMOL/L (ref 21–32)
CREAT SERPL-MCNC: 0.7 MG/DL (ref 0.6–1.1)
DEPRECATED RDW RBC AUTO: 14.2 % (ref 12.4–15.4)
EOSINOPHIL # BLD: 0.1 K/UL (ref 0–0.6)
EOSINOPHIL NFR BLD: 3.3 %
GFR SERPLBLD CREATININE-BSD FMLA CKD-EPI: >60 ML/MIN/{1.73_M2}
GLUCOSE SERPL-MCNC: 102 MG/DL (ref 70–99)
HCT VFR BLD AUTO: 45 % (ref 36–48)
HDLC SERPL-MCNC: 64 MG/DL (ref 40–60)
HGB BLD-MCNC: 14.5 G/DL (ref 12–16)
LDLC SERPL CALC-MCNC: 105 MG/DL
LYMPHOCYTES # BLD: 1.6 K/UL (ref 1–5.1)
LYMPHOCYTES NFR BLD: 37.8 %
MCH RBC QN AUTO: 28 PG (ref 26–34)
MCHC RBC AUTO-ENTMCNC: 32.3 G/DL (ref 31–36)
MCV RBC AUTO: 86.5 FL (ref 80–100)
MONOCYTES # BLD: 0.4 K/UL (ref 0–1.3)
MONOCYTES NFR BLD: 9.7 %
NEUTROPHILS # BLD: 2.1 K/UL (ref 1.7–7.7)
NEUTROPHILS NFR BLD: 48.5 %
PLATELET # BLD AUTO: 218 K/UL (ref 135–450)
PMV BLD AUTO: 8.9 FL (ref 5–10.5)
POTASSIUM SERPL-SCNC: 5 MMOL/L (ref 3.5–5.1)
PROT SERPL-MCNC: 7.4 G/DL (ref 6.4–8.2)
RBC # BLD AUTO: 5.2 M/UL (ref 4–5.2)
SODIUM SERPL-SCNC: 138 MMOL/L (ref 136–145)
TRIGL SERPL-MCNC: 180 MG/DL (ref 0–150)
TSH SERPL DL<=0.005 MIU/L-ACNC: 0.01 UIU/ML (ref 0.27–4.2)
VLDLC SERPL CALC-MCNC: 36 MG/DL
WBC # BLD AUTO: 4.3 K/UL (ref 4–11)

## 2023-11-07 ENCOUNTER — OFFICE VISIT (OUTPATIENT)
Dept: FAMILY MEDICINE CLINIC | Age: 58
End: 2023-11-07
Payer: COMMERCIAL

## 2023-11-07 VITALS
TEMPERATURE: 97.4 F | SYSTOLIC BLOOD PRESSURE: 118 MMHG | DIASTOLIC BLOOD PRESSURE: 72 MMHG | BODY MASS INDEX: 29.27 KG/M2 | HEART RATE: 82 BPM | OXYGEN SATURATION: 98 % | HEIGHT: 65 IN | RESPIRATION RATE: 16 BRPM | WEIGHT: 175.7 LBS

## 2023-11-07 DIAGNOSIS — Z00.00 WELL ADULT EXAM: Primary | ICD-10-CM

## 2023-11-07 DIAGNOSIS — E03.9 HYPOTHYROIDISM, UNSPECIFIED TYPE: ICD-10-CM

## 2023-11-07 DIAGNOSIS — E66.3 OVERWEIGHT (BMI 25.0-29.9): ICD-10-CM

## 2023-11-07 DIAGNOSIS — E78.5 HYPERLIPIDEMIA, UNSPECIFIED HYPERLIPIDEMIA TYPE: ICD-10-CM

## 2023-11-07 DIAGNOSIS — Z00.00 ENCOUNTER FOR WELL ADULT EXAM WITHOUT ABNORMAL FINDINGS: ICD-10-CM

## 2023-11-07 PROCEDURE — 3074F SYST BP LT 130 MM HG: CPT | Performed by: FAMILY MEDICINE

## 2023-11-07 PROCEDURE — 99396 PREV VISIT EST AGE 40-64: CPT | Performed by: FAMILY MEDICINE

## 2023-11-07 PROCEDURE — 3078F DIAST BP <80 MM HG: CPT | Performed by: FAMILY MEDICINE

## 2023-11-07 ASSESSMENT — ENCOUNTER SYMPTOMS
RHINORRHEA: 0
WHEEZING: 0
EYE ITCHING: 0
NAUSEA: 0
CHEST TIGHTNESS: 0
VOMITING: 0
SHORTNESS OF BREATH: 0
TROUBLE SWALLOWING: 0
EYE REDNESS: 0
ABDOMINAL PAIN: 0

## 2023-11-07 NOTE — PROGRESS NOTES
Well Adult Note  Name: Cullen Market Date: 2023   MRN: [id number] Sex: Female   Age: 66 y.o. Ethnicity: Non- / Non    : 1965 Race: White (non-)      Chiara Begum is here for well adult exam.  History:  Well Adult Physical   Patient here for a comprehensive physical exam.The patient reports problems -   Hypothyroidism:  Now on replacement therapy, denies any fatigue, slow thought process, tremor, hair loss, diaphoresis, heat/cold intolerance, wt gain/loss, diarrhea/constipation. Overweight   Has been following weight watchers plan but still not losing wt. She walks 15 K steps three times / week and wt training 3 days too       Do you take any herbs or supplements that were not prescribed by a doctor? no Are you taking calcium supplements? no Are you taking aspirin daily? not applicable   History:  Pap utd       Review of Systems   Constitutional:  Negative for activity change, appetite change, fatigue, fever and unexpected weight change. HENT:  Negative for congestion, postnasal drip, rhinorrhea and trouble swallowing. Eyes:  Negative for redness and itching. Respiratory:  Negative for chest tightness, shortness of breath and wheezing. Cardiovascular:  Negative for chest pain and palpitations. Gastrointestinal:  Negative for abdominal pain, nausea and vomiting. Endocrine: Negative for polydipsia, polyphagia and polyuria. Genitourinary:  Negative for dysuria and urgency. Musculoskeletal:  Negative for arthralgias, joint swelling, myalgias and neck pain. Skin:  Negative for rash. Neurological:  Negative for dizziness, light-headedness and headaches. Hematological:  Negative for adenopathy. Psychiatric/Behavioral:  The patient is not nervous/anxious. Allergies   Allergen Reactions    Heparin      Hives          Prior to Visit Medications    Medication Sig Taking?  Authorizing Provider   amitriptyline (ELAVIL) 25 MG tablet TAKE 3 TABLETS

## 2023-11-20 RX ORDER — OMEPRAZOLE 20 MG/1
CAPSULE, DELAYED RELEASE ORAL
Qty: 30 CAPSULE | Refills: 5 | Status: SHIPPED | OUTPATIENT
Start: 2023-11-20

## 2023-11-20 NOTE — TELEPHONE ENCOUNTER
Medication:   Requested Prescriptions     Pending Prescriptions Disp Refills    omeprazole (PRILOSEC) 20 MG delayed release capsule [Pharmacy Med Name: OMEPRAZOLE DR 20 MG CAPSULE] 30 capsule 5     Sig: TAKE 1 CAPSULE BY MOUTH EVERY DAY        Last Filled:      Patient Phone Number: [phone] (home)     Last appt: 11/7/2023   Next appt: Visit date not found    Last OARRS:       8/6/2018    11:58 AM   RX Monitoring   Attestation The Prescription Monitoring Report for this patient was reviewed today. Periodic Controlled Substance Monitoring Possible medication side effects, risk of tolerance/dependence & alternative treatments discussed. ;No signs of potential drug abuse or diversion identified.

## 2024-01-08 ENCOUNTER — COMMUNITY OUTREACH (OUTPATIENT)
Dept: FAMILY MEDICINE CLINIC | Age: 59
End: 2024-01-08

## 2024-01-17 DIAGNOSIS — M79.2 NEUROLOGICAL PAIN DISORDER: ICD-10-CM

## 2024-01-17 NOTE — TELEPHONE ENCOUNTER
Medication:   Requested Prescriptions     Pending Prescriptions Disp Refills    atenolol (TENORMIN) 25 MG tablet [Pharmacy Med Name: ATENOLOL 25 MG TABLET] 30 tablet 5     Sig: TAKE 1 TABLET BY MOUTH EVERY DAY    amitriptyline (ELAVIL) 25 MG tablet [Pharmacy Med Name: AMITRIPTYLINE HCL 25 MG TAB] 90 tablet 2     Sig: TAKE 3 TABLETS BY MOUTH AT BEDTIME        Last Filled:      Patient Phone Number: [phone] (home)     Last appt: 11/7/2023   Next appt: Visit date not found    Last OARRS:       8/6/2018    11:58 AM   RX Monitoring   Attestation The Prescription Monitoring Report for this patient was reviewed today.   Periodic Controlled Substance Monitoring Possible medication side effects, risk of tolerance/dependence & alternative treatments discussed.;No signs of potential drug abuse or diversion identified.

## 2024-01-18 RX ORDER — ATENOLOL 25 MG/1
TABLET ORAL
Qty: 90 TABLET | Refills: 1 | Status: SHIPPED | OUTPATIENT
Start: 2024-01-18

## 2024-01-18 RX ORDER — AMITRIPTYLINE HYDROCHLORIDE 25 MG/1
TABLET, FILM COATED ORAL
Qty: 90 TABLET | Refills: 2 | Status: SHIPPED | OUTPATIENT
Start: 2024-01-18

## 2024-05-19 DIAGNOSIS — R51.9 NONINTRACTABLE HEADACHE, UNSPECIFIED CHRONICITY PATTERN, UNSPECIFIED HEADACHE TYPE: ICD-10-CM

## 2024-05-20 RX ORDER — BUTALBITAL, ACETAMINOPHEN AND CAFFEINE 50; 325; 40 MG/1; MG/1; MG/1
1 TABLET ORAL EVERY 6 HOURS PRN
Qty: 30 TABLET | Refills: 0 | Status: SHIPPED | OUTPATIENT
Start: 2024-05-20

## 2024-05-20 NOTE — TELEPHONE ENCOUNTER
Medication:   Requested Prescriptions     Pending Prescriptions Disp Refills    butalbital-acetaminophen-caffeine (FIORICET, ESGIC) -40 MG per tablet [Pharmacy Med Name: [id number] -40] 30 tablet 1     Sig: TAKE 1 TABLET BY MOUTH EVERY 6 HOURS AS NEEDED FOR HEADACHES        Last Filled:     10/03/2023        Patient Phone Number: [phone] (home)     Last appt: 11/7/2023   Next appt: Visit date not found    Last OARRS:       8/6/2018    11:58 AM   RX Monitoring   Attestation The Prescription Monitoring Report for this patient was reviewed today.   Periodic Controlled Substance Monitoring Possible medication side effects, risk of tolerance/dependence & alternative treatments discussed.;No signs of potential drug abuse or diversion identified.

## 2024-06-10 NOTE — TELEPHONE ENCOUNTER
Last OV:  5/11/2020 I attest my time as attending is greater than 50% of the total combined time spent on qualifying patient care activities by the PA/NP and attending.

## 2024-06-19 ENCOUNTER — TELEPHONE (OUTPATIENT)
Dept: FAMILY MEDICINE CLINIC | Age: 59
End: 2024-06-19

## 2024-06-19 ENCOUNTER — OFFICE VISIT (OUTPATIENT)
Dept: FAMILY MEDICINE CLINIC | Age: 59
End: 2024-06-19
Payer: COMMERCIAL

## 2024-06-19 VITALS
RESPIRATION RATE: 16 BRPM | SYSTOLIC BLOOD PRESSURE: 116 MMHG | WEIGHT: 165 LBS | TEMPERATURE: 99 F | OXYGEN SATURATION: 100 % | DIASTOLIC BLOOD PRESSURE: 70 MMHG | HEIGHT: 65 IN | BODY MASS INDEX: 27.49 KG/M2 | HEART RATE: 55 BPM

## 2024-06-19 DIAGNOSIS — R50.9 FUO (FEVER OF UNKNOWN ORIGIN): Primary | ICD-10-CM

## 2024-06-19 PROCEDURE — 3078F DIAST BP <80 MM HG: CPT | Performed by: FAMILY MEDICINE

## 2024-06-19 PROCEDURE — 3074F SYST BP LT 130 MM HG: CPT | Performed by: FAMILY MEDICINE

## 2024-06-19 PROCEDURE — 99213 OFFICE O/P EST LOW 20 MIN: CPT | Performed by: FAMILY MEDICINE

## 2024-06-19 SDOH — ECONOMIC STABILITY: HOUSING INSECURITY
IN THE LAST 12 MONTHS, WAS THERE A TIME WHEN YOU DID NOT HAVE A STEADY PLACE TO SLEEP OR SLEPT IN A SHELTER (INCLUDING NOW)?: NO

## 2024-06-19 SDOH — ECONOMIC STABILITY: INCOME INSECURITY: HOW HARD IS IT FOR YOU TO PAY FOR THE VERY BASICS LIKE FOOD, HOUSING, MEDICAL CARE, AND HEATING?: NOT HARD AT ALL

## 2024-06-19 SDOH — ECONOMIC STABILITY: FOOD INSECURITY: WITHIN THE PAST 12 MONTHS, THE FOOD YOU BOUGHT JUST DIDN'T LAST AND YOU DIDN'T HAVE MONEY TO GET MORE.: NEVER TRUE

## 2024-06-19 SDOH — ECONOMIC STABILITY: FOOD INSECURITY: WITHIN THE PAST 12 MONTHS, YOU WORRIED THAT YOUR FOOD WOULD RUN OUT BEFORE YOU GOT MONEY TO BUY MORE.: NEVER TRUE

## 2024-06-19 ASSESSMENT — PATIENT HEALTH QUESTIONNAIRE - PHQ9
3. TROUBLE FALLING OR STAYING ASLEEP: NOT AT ALL
7. TROUBLE CONCENTRATING ON THINGS, SUCH AS READING THE NEWSPAPER OR WATCHING TELEVISION: NOT AT ALL
4. FEELING TIRED OR HAVING LITTLE ENERGY: NOT AT ALL
6. FEELING BAD ABOUT YOURSELF - OR THAT YOU ARE A FAILURE OR HAVE LET YOURSELF OR YOUR FAMILY DOWN: NOT AT ALL
1. LITTLE INTEREST OR PLEASURE IN DOING THINGS: NOT AT ALL
2. FEELING DOWN, DEPRESSED OR HOPELESS: NOT AT ALL
SUM OF ALL RESPONSES TO PHQ QUESTIONS 1-9: 0
10. IF YOU CHECKED OFF ANY PROBLEMS, HOW DIFFICULT HAVE THESE PROBLEMS MADE IT FOR YOU TO DO YOUR WORK, TAKE CARE OF THINGS AT HOME, OR GET ALONG WITH OTHER PEOPLE: NOT DIFFICULT AT ALL
5. POOR APPETITE OR OVEREATING: NOT AT ALL
SUM OF ALL RESPONSES TO PHQ QUESTIONS 1-9: 0
SUM OF ALL RESPONSES TO PHQ QUESTIONS 1-9: 0
SUM OF ALL RESPONSES TO PHQ9 QUESTIONS 1 & 2: 0
9. THOUGHTS THAT YOU WOULD BE BETTER OFF DEAD, OR OF HURTING YOURSELF: NOT AT ALL
8. MOVING OR SPEAKING SO SLOWLY THAT OTHER PEOPLE COULD HAVE NOTICED. OR THE OPPOSITE, BEING SO FIGETY OR RESTLESS THAT YOU HAVE BEEN MOVING AROUND A LOT MORE THAN USUAL: NOT AT ALL
SUM OF ALL RESPONSES TO PHQ QUESTIONS 1-9: 0

## 2024-06-19 ASSESSMENT — ENCOUNTER SYMPTOMS
EYE PAIN: 0
SHORTNESS OF BREATH: 1
ABDOMINAL PAIN: 0
COUGH: 0
WHEEZING: 0
RHINORRHEA: 0
SORE THROAT: 1
SINUS PRESSURE: 0
EYES NEGATIVE: 1
EYE ITCHING: 0

## 2024-06-19 NOTE — TELEPHONE ENCOUNTER
Pt called to state she believes she is positive for covid but her at home test doesn't have instructions in english.  I googled instructions and walked pt through.  Pt will call back w/ results. Pt originally called to request Paxlovid.

## 2024-06-19 NOTE — PROGRESS NOTES
Lorri Poewll (:  1965) is a 59 y.o. female,Established patient, here for evaluation of the following chief complaint(s):  Other (Pt states that she has been running a fever and feels like she has Pneumonia, she says she has had it before and it feels the same. Also, throat is sore since yesterday)          Subjective   SUBJECTIVE/OBJECTIVE:  HPI  59-year-old female patient here for sick visit.  Patient says she has been running a fever for the last 2 days, temp is around 10 1-1 02.  She started her symptoms with sore throat.  She has chest pressure and has back pain.  She says her whole body hurts and she is feeling weak and tired.  She is also feeling shortness of breath.  She did take a COVID test today which was negative at home.  She also complains of nausea and has vomited in the room    Review of Systems   Constitutional:  Positive for fatigue.   HENT:  Positive for sore throat. Negative for congestion, ear pain, rhinorrhea and sinus pressure.    Eyes: Negative.  Negative for pain and itching.   Respiratory:  Positive for shortness of breath. Negative for cough and wheezing.    Cardiovascular:  Negative for chest pain and palpitations.   Gastrointestinal:  Negative for abdominal pain.   Genitourinary:  Negative for frequency and urgency.   Musculoskeletal:  Negative for gait problem.   Skin:  Negative for rash.   Neurological:  Positive for weakness. Negative for dizziness and headaches.   Psychiatric/Behavioral:  The patient is not nervous/anxious.           Objective   Physical Exam  Constitutional:       Appearance: Normal appearance.   HENT:      Head: Normocephalic and atraumatic.      Right Ear: Tympanic membrane normal.      Left Ear: Tympanic membrane normal.      Mouth/Throat:      Pharynx: Posterior oropharyngeal erythema present.   Eyes:      Conjunctiva/sclera: Conjunctivae normal.      Pupils: Pupils are equal, round, and reactive to light.   Cardiovascular:      Rate and Rhythm: Normal

## 2024-06-26 ENCOUNTER — PATIENT MESSAGE (OUTPATIENT)
Dept: FAMILY MEDICINE CLINIC | Age: 59
End: 2024-06-26

## 2024-06-26 ENCOUNTER — TELEMEDICINE (OUTPATIENT)
Dept: FAMILY MEDICINE CLINIC | Age: 59
End: 2024-06-26
Payer: COMMERCIAL

## 2024-06-26 DIAGNOSIS — J02.9 ACUTE PHARYNGITIS, UNSPECIFIED ETIOLOGY: ICD-10-CM

## 2024-06-26 DIAGNOSIS — E03.9 HYPOTHYROIDISM, UNSPECIFIED TYPE: ICD-10-CM

## 2024-06-26 DIAGNOSIS — Z00.00 WELL ADULT EXAM: ICD-10-CM

## 2024-06-26 DIAGNOSIS — R50.9 FEVER, UNSPECIFIED FEVER CAUSE: Primary | ICD-10-CM

## 2024-06-26 PROCEDURE — 99214 OFFICE O/P EST MOD 30 MIN: CPT | Performed by: FAMILY MEDICINE

## 2024-06-26 RX ORDER — CEPHALEXIN 250 MG/1
250 CAPSULE ORAL 3 TIMES DAILY
Qty: 30 CAPSULE | Refills: 0 | Status: SHIPPED | OUTPATIENT
Start: 2024-06-26

## 2024-06-26 RX ORDER — PREDNISONE 10 MG/1
10 TABLET ORAL 2 TIMES DAILY
Qty: 10 TABLET | Refills: 0 | Status: SHIPPED | OUTPATIENT
Start: 2024-06-26 | End: 2024-07-01

## 2024-06-26 ASSESSMENT — ENCOUNTER SYMPTOMS
ABDOMINAL PAIN: 0
DIARRHEA: 0
NAUSEA: 0
VOMITING: 0
COUGH: 0
WHEEZING: 0
SORE THROAT: 1

## 2024-06-26 NOTE — PROGRESS NOTES
Lorri Powell, was evaluated through a synchronous (real-time) audio-video encounter. The patient (or guardian if applicable) is aware that this is a billable service, which includes applicable co-pays. This Virtual Visit was conducted with patient's (and/or legal guardian's) consent. Patient identification was verified, and a caregiver was present when appropriate.   The patient was located at Home: [address] [place] [id number]  Provider was located at Facility (Appt Dept): [address] [place] [address], [address]  [place] [address]  Confirm you are appropriately licensed, registered, or certified to deliver care in the state where the patient is located as indicated above. If you are not or unsure, please re-schedule the visit: Yes, I confirm.     Lorri Powell (:  1965) is a Established patient, presenting virtually for evaluation of the following:    Assessment & Plan   Below is the assessment and plan developed based on review of pertinent history, physical exam, labs, studies, and medications.     Diagnosis Orders   1. Fever, unspecified fever cause :  Repeat CBC with diff   Rx for keflex   Fu in 2 days OV  CBC with Auto Differential      2. Acute pharyngitis, unspecified etiology   Rx for keflex / prednisone  Will see ENT on .    predniSONE (DELTASONE) 10 MG tablet    cephALEXin (KEFLEX) 250 MG capsule                   Here for follow up from admission to hospital on 24   Reason for visit: fever , sore throat   Diagnosis given: SIRS, UTI pharyngitis   Treatment:  IVF, IV antibiotic, rx cefdinir   Work up: blood work (leukocytosis), last cbc showed monocytes. blood culture (negative), CT neck neg for abscess. CXR : neg   Recommended follow up: 1 week  Now with following symptoms      Subjective   Fever   This is a new problem. Episode onset: 5 dyas. The problem occurs intermittently. The problem has been unchanged. The maximum temperature noted was 100 to 100.9 F (yesterday

## 2024-06-27 LAB
BASOPHILS # BLD: 0.1 K/UL (ref 0–0.2)
BASOPHILS NFR BLD: 0.7 %
DEPRECATED RDW RBC AUTO: 13.5 % (ref 12.4–15.4)
EOSINOPHIL # BLD: 0 K/UL (ref 0–0.6)
EOSINOPHIL NFR BLD: 0.6 %
HCT VFR BLD AUTO: 45.5 % (ref 36–48)
HGB BLD-MCNC: 14.5 G/DL (ref 12–16)
LYMPHOCYTES # BLD: 1.6 K/UL (ref 1–5.1)
LYMPHOCYTES NFR BLD: 23.6 %
MCH RBC QN AUTO: 27.3 PG (ref 26–34)
MCHC RBC AUTO-ENTMCNC: 31.8 G/DL (ref 31–36)
MCV RBC AUTO: 85.7 FL (ref 80–100)
MONOCYTES # BLD: 0.3 K/UL (ref 0–1.3)
MONOCYTES NFR BLD: 3.9 %
NEUTROPHILS # BLD: 4.9 K/UL (ref 1.7–7.7)
NEUTROPHILS NFR BLD: 71.2 %
PLATELET # BLD AUTO: 372 K/UL (ref 135–450)
PMV BLD AUTO: 8.5 FL (ref 5–10.5)
RBC # BLD AUTO: 5.31 M/UL (ref 4–5.2)
TSH SERPL DL<=0.005 MIU/L-ACNC: 0.03 UIU/ML (ref 0.27–4.2)
WBC # BLD AUTO: 6.9 K/UL (ref 4–11)

## 2024-06-27 NOTE — TELEPHONE ENCOUNTER
From: Lorri Powell  To: Dr. Sonia Thompson  Sent: 6/26/2024 2:48 PM EDT  Subject: Lab work today    I went to a [facility] lab near [facility] today. She stuck me 3 times and was unable to get any blood. She was only allowed to stick me 3 times. I can go to another place tomorrow to try again.   Thanks.  Lorri

## 2024-06-27 NOTE — TELEPHONE ENCOUNTER
MA sent message via [facility] system informing patient that we are aware and appreciate that she is willing to travel to get the labs completed.

## 2024-06-28 LAB
EST. AVERAGE GLUCOSE BLD GHB EST-MCNC: 128.4 MG/DL
HBA1C MFR BLD: 6.1 %

## 2024-07-01 DIAGNOSIS — E03.9 HYPOTHYROIDISM, UNSPECIFIED TYPE: Primary | ICD-10-CM

## 2024-07-01 RX ORDER — LEVOTHYROXINE SODIUM 175 UG/1
175 TABLET ORAL DAILY
Qty: 30 TABLET | Refills: 0 | Status: SHIPPED | OUTPATIENT
Start: 2024-07-01

## 2024-07-02 ENCOUNTER — TELEPHONE (OUTPATIENT)
Dept: FAMILY MEDICINE CLINIC | Age: 59
End: 2024-07-02

## 2024-07-02 NOTE — TELEPHONE ENCOUNTER
----- Message from HOLLIE Alvarez CNP sent at 7/1/2024  9:30 AM EDT -----  Thyroid labs are high, pt should decrease levothyroxine dose to 175, script sent to pharmacy and recheck labs in 4 weeks

## 2024-07-02 NOTE — TELEPHONE ENCOUNTER
MA place call to [phone] (home)  spoke with patient and informed her of Daniel Barrientos CNP's message stated below. Patient stated understanding.

## 2024-07-15 DIAGNOSIS — R51.9 NONINTRACTABLE HEADACHE, UNSPECIFIED CHRONICITY PATTERN, UNSPECIFIED HEADACHE TYPE: ICD-10-CM

## 2024-07-15 NOTE — TELEPHONE ENCOUNTER
Medication:   Requested Prescriptions     Pending Prescriptions Disp Refills    butalbital-acetaminophen-caffeine (FIORICET, ESGIC) -40 MG per tablet [Pharmacy Med Name: [id number] -40] 30 tablet 0     Sig: TAKE 1 TABLET BY MOUTH EVERY 6 HOURS AS NEEDED FOR HEADACHES        Last Filled:     05/20/2024        Patient Phone Number: [phone] (home)     Last appt: 6/26/2024   Next appt: Visit date not found    Last OARRS:       8/6/2018    11:58 AM   RX Monitoring   Attestation The Prescription Monitoring Report for this patient was reviewed today.   Periodic Controlled Substance Monitoring Possible medication side effects, risk of tolerance/dependence & alternative treatments discussed.;No signs of potential drug abuse or diversion identified.

## 2024-07-15 NOTE — TELEPHONE ENCOUNTER
Medication:   Requested Prescriptions     Pending Prescriptions Disp Refills    atenolol (TENORMIN) 25 MG tablet [Pharmacy Med Name: ATENOLOL 25 MG TABLET] 30 tablet 5     Sig: TAKE 1 TABLET BY MOUTH EVERY DAY        Last Filled:  01/18/2024     Patient Phone Number: [phone] (home)     Last appt: 6/26/2024   Next appt: Visit date not found    Last OARRS:       8/6/2018    11:58 AM   RX Monitoring   Attestation The Prescription Monitoring Report for this patient was reviewed today.   Periodic Controlled Substance Monitoring Possible medication side effects, risk of tolerance/dependence & alternative treatments discussed.;No signs of potential drug abuse or diversion identified.

## 2024-07-16 RX ORDER — ATENOLOL 25 MG/1
TABLET ORAL
Qty: 30 TABLET | Refills: 5 | Status: SHIPPED | OUTPATIENT
Start: 2024-07-16

## 2024-07-16 RX ORDER — BUTALBITAL, ACETAMINOPHEN AND CAFFEINE 50; 325; 40 MG/1; MG/1; MG/1
1 TABLET ORAL EVERY 6 HOURS PRN
Qty: 30 TABLET | Refills: 0 | Status: SHIPPED | OUTPATIENT
Start: 2024-07-16

## 2024-07-22 RX ORDER — BUPROPION HYDROCHLORIDE 300 MG/1
TABLET ORAL
Qty: 30 TABLET | Refills: 8 | Status: SHIPPED | OUTPATIENT
Start: 2024-07-22

## 2024-07-22 RX ORDER — ESCITALOPRAM OXALATE 20 MG/1
TABLET ORAL
Qty: 30 TABLET | Refills: 8 | Status: SHIPPED | OUTPATIENT
Start: 2024-07-22

## 2024-07-22 NOTE — TELEPHONE ENCOUNTER
Medication:   Requested Prescriptions     Pending Prescriptions Disp Refills    escitalopram (LEXAPRO) 20 MG tablet [Pharmacy Med Name: ESCITALOPRAM 20 MG TABLET] 30 tablet 8     Sig: TAKE 1 TABLET BY MOUTH EVERY DAY    buPROPion (WELLBUTRIN XL) 300 MG extended release tablet [Pharmacy Med Name: BUPROPION HCL  MG TABLET] 30 tablet 8     Sig: TAKE 1 TABLET BY MOUTH EVERY DAY IN THE MORNING        Last Filled:      Patient Phone Number: [phone] (home)     Last appt: 6/26/2024   Next appt: Visit date not found    Last OARRS:       8/6/2018    11:58 AM   RX Monitoring   Attestation The Prescription Monitoring Report for this patient was reviewed today.   Periodic Controlled Substance Monitoring Possible medication side effects, risk of tolerance/dependence & alternative treatments discussed.;No signs of potential drug abuse or diversion identified.

## 2024-07-30 DIAGNOSIS — E03.9 HYPOTHYROIDISM, UNSPECIFIED TYPE: ICD-10-CM

## 2024-07-30 RX ORDER — LEVOTHYROXINE SODIUM 175 UG/1
TABLET ORAL
Qty: 90 TABLET | Refills: 1 | Status: SHIPPED | OUTPATIENT
Start: 2024-07-30

## 2024-07-30 NOTE — TELEPHONE ENCOUNTER
Medication:   Requested Prescriptions     Pending Prescriptions Disp Refills    levothyroxine (SYNTHROID) 175 MCG tablet [Pharmacy Med Name: LEVOTHYROXINE 175 MCG TABLET] 90 tablet 1     Sig: TAKE 1 TABLET BY MOUTH EVERY DAY        Last Filled:     07/01/2024        Patient Phone Number: [phone] (home)     Last appt: 6/26/2024   Next appt: Visit date not found    Last OARRS:       8/6/2018    11:58 AM   RX Monitoring   Attestation The Prescription Monitoring Report for this patient was reviewed today.   Periodic Controlled Substance Monitoring Possible medication side effects, risk of tolerance/dependence & alternative treatments discussed.;No signs of potential drug abuse or diversion identified.

## 2024-08-08 DIAGNOSIS — M79.2 NEUROLOGICAL PAIN DISORDER: ICD-10-CM

## 2024-08-08 NOTE — TELEPHONE ENCOUNTER
Medication:   Requested Prescriptions     Pending Prescriptions Disp Refills    amitriptyline (ELAVIL) 25 MG tablet [Pharmacy Med Name: AMITRIPTYLINE HCL 25 MG TAB] 90 tablet 2     Sig: TAKE 3 TABLETS BY MOUTH AT BEDTIME        Last Filled:  01/18/2024     Patient Phone Number: [phone] (home)     Last appt: 6/26/2024   Next appt: Visit date not found    Last OARRS:       8/6/2018    11:58 AM   RX Monitoring   Attestation The Prescription Monitoring Report for this patient was reviewed today.   Periodic Controlled Substance Monitoring Possible medication side effects, risk of tolerance/dependence & alternative treatments discussed.;No signs of potential drug abuse or diversion identified.

## 2024-08-12 RX ORDER — AMITRIPTYLINE HYDROCHLORIDE 25 MG/1
TABLET, FILM COATED ORAL
Qty: 90 TABLET | Refills: 2 | Status: SHIPPED | OUTPATIENT
Start: 2024-08-12

## 2024-11-03 DIAGNOSIS — M79.2 NEUROLOGICAL PAIN DISORDER: ICD-10-CM

## 2024-11-04 NOTE — TELEPHONE ENCOUNTER
Medication:   Requested Prescriptions     Pending Prescriptions Disp Refills    amitriptyline (ELAVIL) 25 MG tablet [Pharmacy Med Name: AMITRIPTYLINE HCL 25 MG TAB] 90 tablet 2     Sig: TAKE 3 TABLETS BY MOUTH AT BEDTIME        Last Filled:     08/12/2024        Patient Phone Number: [phone] (home)     Last appt: 6/26/2024   Next appt: Visit date not found    Last OARRS:       8/6/2018    11:58 AM   RX Monitoring   Attestation The Prescription Monitoring Report for this patient was reviewed today.   Periodic Controlled Substance Monitoring Possible medication side effects, risk of tolerance/dependence & alternative treatments discussed.;No signs of potential drug abuse or diversion identified.

## 2024-11-05 DIAGNOSIS — R51.9 NONINTRACTABLE HEADACHE, UNSPECIFIED CHRONICITY PATTERN, UNSPECIFIED HEADACHE TYPE: ICD-10-CM

## 2024-11-06 NOTE — TELEPHONE ENCOUNTER
Medication:   Requested Prescriptions     Pending Prescriptions Disp Refills    butalbital-acetaminophen-caffeine (FIORICET, ESGIC) -40 MG per tablet [Pharmacy Med Name: [id number] -40] 30 tablet      Sig: TAKE 1 TABLET BY MOUTH EVERY 6 HOURS AS NEEDED FOR HEADACHES        Last Filled:  07/16/2024     Patient Phone Number: [phone] (home)     Last appt: 6/26/2024   Next appt: Visit date not found    Last OARRS:       8/6/2018    11:58 AM   RX Monitoring   Attestation The Prescription Monitoring Report for this patient was reviewed today.   Periodic Controlled Substance Monitoring Possible medication side effects, risk of tolerance/dependence & alternative treatments discussed.;No signs of potential drug abuse or diversion identified.

## 2024-11-07 RX ORDER — BUTALBITAL, ACETAMINOPHEN AND CAFFEINE 50; 325; 40 MG/1; MG/1; MG/1
1 TABLET ORAL EVERY 6 HOURS PRN
Qty: 30 TABLET | OUTPATIENT
Start: 2024-11-07

## 2024-11-18 ENCOUNTER — OFFICE VISIT (OUTPATIENT)
Dept: FAMILY MEDICINE CLINIC | Age: 59
End: 2024-11-18

## 2024-11-18 ENCOUNTER — OFFICE VISIT (OUTPATIENT)
Dept: SURGERY | Age: 59
End: 2024-11-18
Payer: COMMERCIAL

## 2024-11-18 VITALS
HEIGHT: 65 IN | WEIGHT: 176 LBS | BODY MASS INDEX: 29.32 KG/M2 | RESPIRATION RATE: 16 BRPM | HEART RATE: 86 BPM | SYSTOLIC BLOOD PRESSURE: 124 MMHG | DIASTOLIC BLOOD PRESSURE: 81 MMHG

## 2024-11-18 VITALS
SYSTOLIC BLOOD PRESSURE: 116 MMHG | BODY MASS INDEX: 29.12 KG/M2 | WEIGHT: 175 LBS | HEART RATE: 83 BPM | OXYGEN SATURATION: 96 % | TEMPERATURE: 98.1 F | DIASTOLIC BLOOD PRESSURE: 72 MMHG

## 2024-11-18 DIAGNOSIS — N30.00 ACUTE CYSTITIS WITHOUT HEMATURIA: Primary | ICD-10-CM

## 2024-11-18 DIAGNOSIS — Z09 POSTOP CHECK: Primary | ICD-10-CM

## 2024-11-18 LAB
BILIRUBIN, POC: ABNORMAL
BLOOD URINE, POC: ABNORMAL
CLARITY, POC: ABNORMAL
COLOR, POC: ABNORMAL
GLUCOSE URINE, POC: ABNORMAL MG/DL
KETONES, POC: ABNORMAL MG/DL
LEUKOCYTE EST, POC: ABNORMAL
NITRITE, POC: POSITIVE
PH, POC: 5
PROTEIN, POC: ABNORMAL MG/DL
SPECIFIC GRAVITY, POC: 1.01
UROBILINOGEN, POC: 8 MG/DL

## 2024-11-18 PROCEDURE — 99213 OFFICE O/P EST LOW 20 MIN: CPT | Performed by: SURGERY

## 2024-11-18 PROCEDURE — 3074F SYST BP LT 130 MM HG: CPT | Performed by: SURGERY

## 2024-11-18 PROCEDURE — 3079F DIAST BP 80-89 MM HG: CPT | Performed by: SURGERY

## 2024-11-18 RX ORDER — PRIMIDONE 50 MG/1
100 TABLET ORAL NIGHTLY
COMMUNITY
Start: 2024-08-20

## 2024-11-18 RX ORDER — ANASTROZOLE 1 MG/1
1 TABLET ORAL DAILY
COMMUNITY
Start: 2024-10-28

## 2024-11-18 RX ORDER — ACYCLOVIR 50 MG/G
CREAM TOPICAL
COMMUNITY
Start: 2024-08-29

## 2024-11-18 RX ORDER — BACLOFEN 10 MG/1
10 TABLET ORAL 2 TIMES DAILY
COMMUNITY

## 2024-11-18 RX ORDER — NITROFURANTOIN 25; 75 MG/1; MG/1
100 CAPSULE ORAL 2 TIMES DAILY
Qty: 20 CAPSULE | Refills: 0 | Status: SHIPPED | OUTPATIENT
Start: 2024-11-18 | End: 2024-11-28

## 2024-11-18 RX ORDER — PROPRANOLOL HCL 20 MG
20 TABLET ORAL EVERY MORNING
COMMUNITY
Start: 2024-10-29

## 2024-11-18 ASSESSMENT — ENCOUNTER SYMPTOMS
EYE PAIN: 0
COUGH: 0
SORE THROAT: 0
SINUS PRESSURE: 0
WHEEZING: 0
EYE ITCHING: 0
RHINORRHEA: 0
ABDOMINAL PAIN: 0
EYES NEGATIVE: 1
SHORTNESS OF BREATH: 0

## 2024-11-18 NOTE — PROGRESS NOTES
Lorri Powell (:  1965) is a 59 y.o. female,Established patient, here for evaluation of the following chief complaint(s):  Urinary Tract Infection (Stated that she has been having pressure with urination, dysuria, and frequency x AM today. )          Subjective   SUBJECTIVE/OBJECTIVE:  HPI  59-year-old female patient here for symptoms of urine frequency and dysuria for last 2 to 3 days.  No fever.  Patient complains of some blood in the urine too.    Review of Systems   Constitutional: Negative.  Negative for fatigue.   HENT:  Negative for congestion, ear pain, rhinorrhea, sinus pressure and sore throat.    Eyes: Negative.  Negative for pain and itching.   Respiratory:  Negative for cough, shortness of breath and wheezing.    Cardiovascular:  Negative for chest pain and palpitations.   Gastrointestinal:  Negative for abdominal pain.   Genitourinary:  Positive for dysuria. Negative for frequency and urgency.   Musculoskeletal:  Negative for gait problem.   Skin:  Negative for rash.   Neurological:  Negative for dizziness and headaches.   Psychiatric/Behavioral:  The patient is not nervous/anxious.           Objective   Physical Exam  Constitutional:       Appearance: Normal appearance.   HENT:      Head: Normocephalic and atraumatic.   Cardiovascular:      Rate and Rhythm: Normal rate and regular rhythm.   Pulmonary:      Effort: Pulmonary effort is normal.      Breath sounds: Normal breath sounds. No wheezing.   Neurological:      Mental Status: She is alert.   Psychiatric:         Mood and Affect: Mood normal.         Results for orders placed or performed in visit on 24   POCT Urinalysis no Micro   Result Value Ref Range    Color, UA orange     Clarity, UA dark     Glucose, UA POC 1+ mg/dL    Bilirubin, UA 1+     Ketones, UA 1+ mg/dL    Spec Grav, UA 1.010     Blood, UA POC 2+     pH, UA 5.0     Protein, UA POC 3+ mg/dL    Urobilinogen, UA 8.0 mg/dL    Leukocytes, UA 3+     Nitrite, UA positive

## 2024-11-18 NOTE — PROGRESS NOTES
Medication Sig Dispense Refill    amitriptyline (ELAVIL) 25 MG tablet TAKE 3 TABLETS BY MOUTH AT BEDTIME 90 tablet 0    omeprazole (PRILOSEC) 20 MG delayed release capsule TAKE 1 CAPSULE BY MOUTH EVERY DAY 90 capsule 1    levothyroxine (SYNTHROID) 175 MCG tablet One po qam 90 tablet 1    escitalopram (LEXAPRO) 20 MG tablet TAKE 1 TABLET BY MOUTH EVERY DAY 30 tablet 8    buPROPion (WELLBUTRIN XL) 300 MG extended release tablet TAKE 1 TABLET BY MOUTH EVERY DAY IN THE MORNING 30 tablet 8    atenolol (TENORMIN) 25 MG tablet TAKE 1 TABLET BY MOUTH EVERY DAY 30 tablet 5    butalbital-acetaminophen-caffeine (FIORICET, ESGIC) -40 MG per tablet TAKE 1 TABLET BY MOUTH EVERY 6 HOURS AS NEEDED FOR HEADACHES 30 tablet 0    cephALEXin (KEFLEX) 250 MG capsule Take 1 capsule by mouth 3 times daily 30 capsule 0    clonazePAM (KLONOPIN) 1 MG tablet Take 1 tablet by mouth 2 times daily as needed for Anxiety for up to 30 days. 30 tablet 0    Efinaconazole (JUBLIA) 10 % SOLN Apply 1-2 drops to affected toenails daily x48 weeks.      estradiol (ESTRACE) 0.1 MG/GM vaginal cream APPLY 0.5 G BY VAGINAL ROUTE 2 TIMES A WEEK FOR 90 DAYS      EMGALITY 120 MG/ML SOAJ       SUMAtriptan (IMITREX) 100 MG tablet TAKE ONE TABLET BY MOUTH AT ONSET OF HEADACHE. MAY REPEAT IN 2 HOURS MAX OF 2 TABS PER 24 HOURS 9 tablet 6     No current facility-administered medications for this visit.       ROS   Constitutional: Negative for chills and fever.   HENT: Negative for congestion, facial swelling, and voice change.    Eyes: Negative for photophobia and visual disturbance.   Respiratory: Negative for apnea, cough, chest tightness and shortness of breath.    Cardiovascular: Negative for chest pain and palpitations.   Gastrointestinal: Negative for dysphagia and early satiety.  Genitourinary: Negative for difficulty urinating, dysuria, flank pain, frequency and hematuria.   Musculoskeletal: Negative for new gait problem, joint swelling and myalgias.

## 2024-11-21 LAB
BACTERIA UR CULT: ABNORMAL
ORGANISM: ABNORMAL

## 2024-11-30 DIAGNOSIS — M79.2 NEUROLOGICAL PAIN DISORDER: ICD-10-CM

## 2024-12-02 NOTE — TELEPHONE ENCOUNTER
Medication:   Requested Prescriptions     Pending Prescriptions Disp Refills    amitriptyline (ELAVIL) 25 MG tablet [Pharmacy Med Name: AMITRIPTYLINE HCL 25 MG TAB] 90 tablet 0     Sig: TAKE 3 TABLETS BY MOUTH AT BEDTIME        Last Filled:      Patient Phone Number: [phone] (home)     Last appt: 11/18/2024   Next appt: Visit date not found    Last OARRS:       8/6/2018    11:58 AM   RX Monitoring   Attestation The Prescription Monitoring Report for this patient was reviewed today.   Periodic Controlled Substance Monitoring Possible medication side effects, risk of tolerance/dependence & alternative treatments discussed.;No signs of potential drug abuse or diversion identified.

## 2025-01-04 DIAGNOSIS — M79.2 NEUROLOGICAL PAIN DISORDER: ICD-10-CM

## 2025-01-07 NOTE — TELEPHONE ENCOUNTER
Medication:   Requested Prescriptions     Pending Prescriptions Disp Refills    amitriptyline (ELAVIL) 25 MG tablet [Pharmacy Med Name: AMITRIPTYLINE HCL 25 MG TAB] 90 tablet 0     Sig: TAKE 3 TABLETS BY MOUTH AT BEDTIME        Last Filled:     12/02/2024        Patient Phone Number: [phone] (home)     Last appt: 11/18/2024   Next appt: Visit date not found    Last OARRS:       8/6/2018    11:58 AM   RX Monitoring   Attestation The Prescription Monitoring Report for this patient was reviewed today.   Periodic Controlled Substance Monitoring Possible medication side effects, risk of tolerance/dependence & alternative treatments discussed.;No signs of potential drug abuse or diversion identified.

## 2025-01-27 RX ORDER — ATENOLOL 25 MG/1
TABLET ORAL
Qty: 30 TABLET | Refills: 5 | Status: SHIPPED | OUTPATIENT
Start: 2025-01-27

## 2025-01-27 NOTE — TELEPHONE ENCOUNTER
Medication:   Requested Prescriptions     Pending Prescriptions Disp Refills    atenolol (TENORMIN) 25 MG tablet [Pharmacy Med Name: ATENOLOL 25 MG TABLET] 30 tablet 5     Sig: TAKE 1 TABLET BY MOUTH EVERY DAY        Last Filled:      07/16/2024       Patient Phone Number: [phone] (home)     Last appt: 11/18/2024   Next appt: Visit date not found    Last OARRS:       8/6/2018    11:58 AM   RX Monitoring   Attestation The Prescription Monitoring Report for this patient was reviewed today.   Periodic Controlled Substance Monitoring Possible medication side effects, risk of tolerance/dependence & alternative treatments discussed.;No signs of potential drug abuse or diversion identified.

## 2025-02-04 DIAGNOSIS — M79.2 NEUROLOGICAL PAIN DISORDER: ICD-10-CM

## 2025-02-04 DIAGNOSIS — E03.9 HYPOTHYROIDISM, UNSPECIFIED TYPE: ICD-10-CM

## 2025-02-04 NOTE — TELEPHONE ENCOUNTER
MOUTH EVERY DAY IN THE MORNING    amitriptyline (ELAVIL) 25 MG tablet [Pharmacy Med Name: AMITRIPTYLINE HCL 25 MG TAB] 90 tablet 0     Sig: TAKE 3 TABLETS BY MOUTH AT BEDTIME        Last Filled:      Patient Phone Number: [phone] (home)     Last appt: 11/18/2024   Next appt: Visit date not found    Last OARRS:       8/6/2018    11:58 AM   RX Monitoring   Attestation The Prescription Monitoring Report for this patient was reviewed today.   Periodic Controlled Substance Monitoring Possible medication side effects, risk of tolerance/dependence & alternative treatments discussed.;No signs of potential drug abuse or diversion identified.

## 2025-02-05 RX ORDER — LEVOTHYROXINE SODIUM 175 UG/1
TABLET ORAL
Qty: 30 TABLET | Refills: 5 | Status: SHIPPED | OUTPATIENT
Start: 2025-02-05

## 2025-03-05 NOTE — PROGRESS NOTES
Patient received in PACU 9 from OR s/p RIGHT BREAST FAT GRAFTING AND - Right with Dr. Arin Bates. Patient placed on PACU monitor on arrival. Report received from CRNA/ OR staff, Per report, patient was stable intra op. On arrival patient was alert with pain score 7/10 in abdominal region. Patient on 2 lpm O@ via nasal cannula. Yes

## 2025-03-08 DIAGNOSIS — M79.2 NEUROLOGICAL PAIN DISORDER: ICD-10-CM

## 2025-03-10 NOTE — TELEPHONE ENCOUNTER
Medication:   Requested Prescriptions     Pending Prescriptions Disp Refills    amitriptyline (ELAVIL) 25 MG tablet [Pharmacy Med Name: AMITRIPTYLINE HCL 25 MG TAB] 90 tablet 0     Sig: TAKE 3 TABLETS BY MOUTH AT BEDTIME        Last Filled:      02/05/2025       Patient Phone Number: [phone] (home)     Last appt: 11/18/2024   Next appt: Visit date not found    Last OARRS:       8/6/2018    11:58 AM   RX Monitoring   Attestation The Prescription Monitoring Report for this patient was reviewed today.   Periodic Controlled Substance Monitoring Possible medication side effects, risk of tolerance/dependence & alternative treatments discussed.;No signs of potential drug abuse or diversion identified.

## 2025-03-24 RX ORDER — OMEPRAZOLE 20 MG/1
CAPSULE, DELAYED RELEASE ORAL DAILY
Qty: 30 CAPSULE | Refills: 5 | Status: SHIPPED | OUTPATIENT
Start: 2025-03-24

## 2025-03-24 NOTE — TELEPHONE ENCOUNTER
Medication:   Requested Prescriptions     Pending Prescriptions Disp Refills    omeprazole (PRILOSEC) 20 MG delayed release capsule [Pharmacy Med Name: OMEPRAZOLE DR 20 MG CAPSULE] 30 capsule 5     Sig: TAKE 1 CAPSULE BY MOUTH EVERY DAY        Last Filled:  9/18/24    Patient Phone Number: [phone] (home)     Last appt: 11/18/2024   Next appt: Visit date not found    Last OARRS:       8/6/2018    11:58 AM   RX Monitoring   Attestation The Prescription Monitoring Report for this patient was reviewed today.   Periodic Controlled Substance Monitoring Possible medication side effects, risk of tolerance/dependence & alternative treatments discussed.;No signs of potential drug abuse or diversion identified.

## 2025-04-06 DIAGNOSIS — M79.2 NEUROLOGICAL PAIN DISORDER: ICD-10-CM

## 2025-04-07 NOTE — TELEPHONE ENCOUNTER
Medication:   Requested Prescriptions     Pending Prescriptions Disp Refills    amitriptyline (ELAVIL) 25 MG tablet [Pharmacy Med Name: AMITRIPTYLINE HCL 25 MG TAB] 90 tablet 0     Sig: TAKE 3 TABLETS BY MOUTH AT BEDTIME        Last Filled:  3/11/25    Patient Phone Number: [phone] (home)     Last appt: 11/18/2024   Next appt: Visit date not found    Last OARRS:       8/6/2018    11:58 AM   RX Monitoring   Attestation The Prescription Monitoring Report for this patient was reviewed today.   Periodic Controlled Substance Monitoring Possible medication side effects, risk of tolerance/dependence & alternative treatments discussed.;No signs of potential drug abuse or diversion identified.

## 2025-04-22 NOTE — TELEPHONE ENCOUNTER
Medication:   Requested Prescriptions     Pending Prescriptions Disp Refills    buPROPion (WELLBUTRIN XL) 300 MG extended release tablet [Pharmacy Med Name: BUPROPION HCL  MG TABLET] 30 tablet 8     Sig: TAKE 1 TABLET BY MOUTH EVERY DAY IN THE MORNING    escitalopram (LEXAPRO) 20 MG tablet [Pharmacy Med Name: ESCITALOPRAM 20 MG TABLET] 30 tablet 8     Sig: TAKE 1 TABLET BY MOUTH EVERY DAY        Last Filled:  7/22/24    Patient Phone Number: [phone] (home)     Last appt: 11/18/2024   Next appt: 4/28/2025    Last OARRS:       8/6/2018    11:58 AM   RX Monitoring   Attestation The Prescription Monitoring Report for this patient was reviewed today.   Periodic Controlled Substance Monitoring Possible medication side effects, risk of tolerance/dependence & alternative treatments discussed.;No signs of potential drug abuse or diversion identified.

## 2025-04-23 RX ORDER — ESCITALOPRAM OXALATE 20 MG/1
20 TABLET ORAL DAILY
Qty: 30 TABLET | Refills: 8 | Status: SHIPPED | OUTPATIENT
Start: 2025-04-23

## 2025-04-23 RX ORDER — BUPROPION HYDROCHLORIDE 300 MG/1
300 TABLET ORAL EVERY MORNING
Qty: 30 TABLET | Refills: 8 | Status: SHIPPED | OUTPATIENT
Start: 2025-04-23

## 2025-04-28 ENCOUNTER — OFFICE VISIT (OUTPATIENT)
Dept: FAMILY MEDICINE CLINIC | Age: 60
End: 2025-04-28
Payer: COMMERCIAL

## 2025-04-28 VITALS
OXYGEN SATURATION: 99 % | DIASTOLIC BLOOD PRESSURE: 70 MMHG | HEIGHT: 65 IN | SYSTOLIC BLOOD PRESSURE: 122 MMHG | WEIGHT: 187 LBS | TEMPERATURE: 97.5 F | HEART RATE: 71 BPM | BODY MASS INDEX: 31.16 KG/M2

## 2025-04-28 DIAGNOSIS — F41.0 PANIC ATTACKS: ICD-10-CM

## 2025-04-28 DIAGNOSIS — C50.912 MALIGNANT NEOPLASM OF LEFT BREAST IN FEMALE, ESTROGEN RECEPTOR NEGATIVE, UNSPECIFIED SITE OF BREAST (HCC): ICD-10-CM

## 2025-04-28 DIAGNOSIS — Z12.11 SCREEN FOR COLON CANCER: ICD-10-CM

## 2025-04-28 DIAGNOSIS — R51.9 NONINTRACTABLE HEADACHE, UNSPECIFIED CHRONICITY PATTERN, UNSPECIFIED HEADACHE TYPE: ICD-10-CM

## 2025-04-28 DIAGNOSIS — C50.911 MALIGNANT NEOPLASM OF RIGHT FEMALE BREAST, UNSPECIFIED ESTROGEN RECEPTOR STATUS, UNSPECIFIED SITE OF BREAST (HCC): ICD-10-CM

## 2025-04-28 DIAGNOSIS — R73.03 PRE-DIABETES: ICD-10-CM

## 2025-04-28 DIAGNOSIS — I10 BENIGN ESSENTIAL HTN: ICD-10-CM

## 2025-04-28 DIAGNOSIS — Z17.1 MALIGNANT NEOPLASM OF LEFT BREAST IN FEMALE, ESTROGEN RECEPTOR NEGATIVE, UNSPECIFIED SITE OF BREAST (HCC): ICD-10-CM

## 2025-04-28 DIAGNOSIS — E03.9 HYPOTHYROIDISM, UNSPECIFIED TYPE: ICD-10-CM

## 2025-04-28 DIAGNOSIS — Z00.00 ENCOUNTER FOR WELL ADULT EXAM WITHOUT ABNORMAL FINDINGS: Primary | ICD-10-CM

## 2025-04-28 PROCEDURE — 3078F DIAST BP <80 MM HG: CPT | Performed by: FAMILY MEDICINE

## 2025-04-28 PROCEDURE — 99396 PREV VISIT EST AGE 40-64: CPT | Performed by: FAMILY MEDICINE

## 2025-04-28 PROCEDURE — 99214 OFFICE O/P EST MOD 30 MIN: CPT | Performed by: FAMILY MEDICINE

## 2025-04-28 PROCEDURE — 3074F SYST BP LT 130 MM HG: CPT | Performed by: FAMILY MEDICINE

## 2025-04-28 RX ORDER — CLONAZEPAM 1 MG/1
1 TABLET ORAL 2 TIMES DAILY PRN
Qty: 30 TABLET | Refills: 0 | Status: SHIPPED | OUTPATIENT
Start: 2025-04-28 | End: 2025-05-28

## 2025-04-28 RX ORDER — HYDROCHLOROTHIAZIDE 25 MG/1
25 TABLET ORAL DAILY
Qty: 90 TABLET | Refills: 1 | Status: SHIPPED | OUTPATIENT
Start: 2025-04-28

## 2025-04-28 RX ORDER — BUTALBITAL, ACETAMINOPHEN AND CAFFEINE 50; 325; 40 MG/1; MG/1; MG/1
1 TABLET ORAL EVERY 6 HOURS PRN
Qty: 30 TABLET | Refills: 0 | Status: SHIPPED | OUTPATIENT
Start: 2025-04-28

## 2025-04-28 SDOH — ECONOMIC STABILITY: FOOD INSECURITY: WITHIN THE PAST 12 MONTHS, THE FOOD YOU BOUGHT JUST DIDN'T LAST AND YOU DIDN'T HAVE MONEY TO GET MORE.: NEVER TRUE

## 2025-04-28 SDOH — ECONOMIC STABILITY: FOOD INSECURITY: WITHIN THE PAST 12 MONTHS, YOU WORRIED THAT YOUR FOOD WOULD RUN OUT BEFORE YOU GOT MONEY TO BUY MORE.: NEVER TRUE

## 2025-04-28 ASSESSMENT — ENCOUNTER SYMPTOMS
TROUBLE SWALLOWING: 0
EYE ITCHING: 0
SHORTNESS OF BREATH: 0
WHEEZING: 0
EYE REDNESS: 0
CHEST TIGHTNESS: 0
VOMITING: 0
NAUSEA: 0
RHINORRHEA: 0
ABDOMINAL PAIN: 0

## 2025-04-28 ASSESSMENT — PATIENT HEALTH QUESTIONNAIRE - PHQ9
SUM OF ALL RESPONSES TO PHQ QUESTIONS 1-9: 2
SUM OF ALL RESPONSES TO PHQ QUESTIONS 1-9: 2
2. FEELING DOWN, DEPRESSED OR HOPELESS: SEVERAL DAYS
SUM OF ALL RESPONSES TO PHQ QUESTIONS 1-9: 2
SUM OF ALL RESPONSES TO PHQ QUESTIONS 1-9: 2
1. LITTLE INTEREST OR PLEASURE IN DOING THINGS: SEVERAL DAYS

## 2025-04-28 NOTE — PROGRESS NOTES
Family History   Problem Relation Age of Onset    Other Brother         hyperthyroidism    High Blood Pressure Brother     Cancer Mother         breast    Mental Illness Mother     Other Mother         hypothyroidism     Social History     Tobacco Use    Smoking status: Never    Smokeless tobacco: Never   Vaping Use    Vaping status: Never Used   Substance Use Topics    Alcohol use: Not Currently    Drug use: Never           Objective    Vital Signs  /70   Pulse 71   Temp 97.5 °F (36.4 °C)   Ht 1.651 m (5' 5\")   Wt 84.8 kg (187 lb)   LMP 02/17/2015 Comment: Spotting   SpO2 99%   BMI 31.12 kg/m²     Wt Readings from Last 3 Encounters:   04/28/25 84.8 kg (187 lb)   11/18/24 79.4 kg (175 lb)   11/18/24 79.8 kg (176 lb)       Physical Exam  Vitals and nursing note reviewed.   Constitutional:       General: She is not in acute distress.     Appearance: Normal appearance. She is well-developed. She is obese. She is not ill-appearing, toxic-appearing or diaphoretic.   HENT:      Head: Normocephalic and atraumatic.      Right Ear: Tympanic membrane, ear canal and external ear normal. There is no impacted cerumen.      Left Ear: Tympanic membrane, ear canal and external ear normal. There is no impacted cerumen.      Nose: Nose normal. No congestion or rhinorrhea.      Mouth/Throat:      Mouth: Mucous membranes are moist.      Pharynx: Oropharynx is clear. No oropharyngeal exudate or posterior oropharyngeal erythema.   Eyes:      General: No scleral icterus.        Right eye: No discharge.         Left eye: No discharge.      Extraocular Movements: Extraocular movements intact.      Conjunctiva/sclera: Conjunctivae normal.      Pupils: Pupils are equal, round, and reactive to light.   Neck:      Thyroid: No thyromegaly.      Vascular: No carotid bruit or JVD.      Trachea: No tracheal deviation.   Cardiovascular:      Rate and Rhythm: Normal rate and regular rhythm.      Heart sounds: Normal heart sounds. No

## 2025-04-29 ENCOUNTER — RESULTS FOLLOW-UP (OUTPATIENT)
Dept: FAMILY MEDICINE CLINIC | Age: 60
End: 2025-04-29

## 2025-04-29 LAB
ALBUMIN: 4.3 G/DL (ref 3.8–4.9)
ALP BLD-CCNC: 71 IU/L (ref 44–121)
ALT SERPL-CCNC: 35 IU/L (ref 0–32)
AST SERPL-CCNC: 31 IU/L (ref 0–40)
BASOPHILS ABSOLUTE: 0 X10E3/UL (ref 0–0.2)
BASOPHILS RELATIVE PERCENT: 1 %
BILIRUB SERPL-MCNC: 0.2 MG/DL (ref 0–1.2)
BUN / CREAT RATIO: 15 (ref 9–23)
BUN BLDV-MCNC: 13 MG/DL (ref 6–24)
CALCIUM SERPL-MCNC: 9.6 MG/DL (ref 8.7–10.2)
CHLORIDE BLD-SCNC: 101 MMOL/L (ref 96–106)
CHOLESTEROL, TOTAL: 222 MG/DL (ref 100–199)
CO2: 23 MMOL/L (ref 20–29)
CREAT SERPL-MCNC: 0.88 MG/DL (ref 0.57–1)
EOSINOPHILS ABSOLUTE: 0.1 X10E3/UL (ref 0–0.4)
EOSINOPHILS RELATIVE PERCENT: 2 %
ESTIMATED GLOMERULAR FILTRATION RATE CREATININE EQUATION: 76 ML/MIN/1.73
GLOBULIN: 2.9 G/DL (ref 1.5–4.5)
GLUCOSE BLD-MCNC: 98 MG/DL (ref 70–99)
HBA1C MFR BLD: 6.2 % (ref 4.8–5.6)
HCT VFR BLD CALC: 43.3 % (ref 34–46.6)
HDLC SERPL-MCNC: 67 MG/DL
HEMOGLOBIN: 14 G/DL (ref 11.1–15.9)
IMMATURE GRANS (ABS): 0 X10E3/UL (ref 0–0.1)
IMMATURE GRANULOCYTES %: 0 %
LDL CHOLESTEROL: 137 MG/DL (ref 0–99)
LYMPHOCYTES ABSOLUTE: 1.2 X10E3/UL (ref 0.7–3.1)
LYMPHOCYTES RELATIVE PERCENT: 26 %
MCH RBC QN AUTO: 27.7 PG (ref 26.6–33)
MCHC RBC AUTO-ENTMCNC: 32.3 G/DL (ref 31.5–35.7)
MCV RBC AUTO: 86 FL (ref 79–97)
MONOCYTES ABSOLUTE: 0.5 X10E3/UL (ref 0.1–0.9)
MONOCYTES RELATIVE PERCENT: 10 %
NEUTROPHILS ABSOLUTE: 2.7 X10E3/UL (ref 1.4–7)
NEUTROPHILS RELATIVE PERCENT: 61 %
PDW BLD-RTO: 14.2 % (ref 11.7–15.4)
PLATELET # BLD: 240 X10E3/UL (ref 150–450)
POTASSIUM SERPL-SCNC: 5.2 MMOL/L (ref 3.5–5.2)
RBC # BLD: 5.06 X10E6/UL (ref 3.77–5.28)
SODIUM BLD-SCNC: 138 MMOL/L (ref 134–144)
TOTAL PROTEIN: 7.2 G/DL (ref 6–8.5)
TRIGL SERPL-MCNC: 101 MG/DL (ref 0–149)
TSH SERPL DL<=0.05 MIU/L-ACNC: 0.09 UIU/ML (ref 0.45–4.5)
VLDLC SERPL CALC-MCNC: 18 MG/DL (ref 5–40)
WBC # BLD: 4.5 X10E3/UL (ref 3.4–10.8)

## 2025-04-29 RX ORDER — LEVOTHYROXINE SODIUM 150 UG/1
150 TABLET ORAL DAILY
Qty: 90 TABLET | Refills: 1 | Status: SHIPPED | OUTPATIENT
Start: 2025-04-29

## 2025-04-29 NOTE — TELEPHONE ENCOUNTER
MA place call to [phone] (home)  spoke with patient and informed her of Dr. Thompson's message stated below. Patient stated understanding.

## 2025-04-29 NOTE — TELEPHONE ENCOUNTER
----- Message from Dr. Sonia Thompson MD sent at 4/29/2025  3:02 PM EDT -----  A1C: 6.2 in pre dm range   Encourage low-carb diet for example no fast food, junk food, soda, sweet drinks like juice, white rice, sweets, candy.   Exercise like walking if possible 25 minutes 4 times per week  Re check in 3  mo     Blood count,  liver, kidney and electrolytes are normal     Chol high 222, nl less 200  LDL (bad chol) 137, goal less 100  encourage low fat diet   reduce: red meats, pork,  processed foods, oils, acevedo, cheese     Thyroid dose is high (when TSH is low we need to reduce dose)   Rx sent for 150 mcg   Stop the 175 mcg.   Re check in lab in 3 mo

## 2025-05-04 DIAGNOSIS — M79.2 NEUROLOGICAL PAIN DISORDER: ICD-10-CM

## 2025-05-05 NOTE — TELEPHONE ENCOUNTER
Medication:   Requested Prescriptions     Pending Prescriptions Disp Refills    amitriptyline (ELAVIL) 25 MG tablet [Pharmacy Med Name: AMITRIPTYLINE HCL 25 MG TAB] 90 tablet 0     Sig: TAKE 3 TABLETS BY MOUTH AT BEDTIME        Last Filled:  4/7/25    Patient Phone Number: [phone] (home)     Last appt: 4/28/2025   Next appt: Visit date not found    Last OARRS:       8/6/2018    11:58 AM   RX Monitoring   Attestation The Prescription Monitoring Report for this patient was reviewed today.   Periodic Controlled Substance Monitoring Possible medication side effects, risk of tolerance/dependence & alternative treatments discussed.;No signs of potential drug abuse or diversion identified.

## 2025-06-06 DIAGNOSIS — M79.2 NEUROLOGICAL PAIN DISORDER: ICD-10-CM

## 2025-06-06 NOTE — TELEPHONE ENCOUNTER
Medication:   Requested Prescriptions     Pending Prescriptions Disp Refills    amitriptyline (ELAVIL) 25 MG tablet [Pharmacy Med Name: AMITRIPTYLINE HCL 25 MG TAB] 90 tablet 0     Sig: TAKE 3 TABLETS BY MOUTH AT BEDTIME        Last Filled:  5/5/25    Patient Phone Number: [phone] (home)     Last appt: 4/28/2025   Next appt: Visit date not found    Last OARRS:       8/6/2018    11:58 AM   RX Monitoring   Attestation The Prescription Monitoring Report for this patient was reviewed today.   Periodic Controlled Substance Monitoring Possible medication side effects, risk of tolerance/dependence & alternative treatments discussed.;No signs of potential drug abuse or diversion identified.

## 2025-07-14 ENCOUNTER — OFFICE VISIT (OUTPATIENT)
Dept: SURGERY | Age: 60
End: 2025-07-14
Payer: COMMERCIAL

## 2025-07-14 VITALS
HEART RATE: 85 BPM | SYSTOLIC BLOOD PRESSURE: 123 MMHG | TEMPERATURE: 98.1 F | OXYGEN SATURATION: 99 % | DIASTOLIC BLOOD PRESSURE: 80 MMHG | RESPIRATION RATE: 16 BRPM | BODY MASS INDEX: 30.39 KG/M2 | WEIGHT: 182.6 LBS

## 2025-07-14 DIAGNOSIS — Z09 POSTOP CHECK: Primary | ICD-10-CM

## 2025-07-14 PROCEDURE — 3074F SYST BP LT 130 MM HG: CPT | Performed by: SURGERY

## 2025-07-14 PROCEDURE — 99213 OFFICE O/P EST LOW 20 MIN: CPT | Performed by: SURGERY

## 2025-07-14 PROCEDURE — 3079F DIAST BP 80-89 MM HG: CPT | Performed by: SURGERY

## 2025-07-14 NOTE — PROGRESS NOTES
[facility]    PROCEDURE: 1) Right breast fat grafting                           2) Abdominoplasty with rectus plication and umbilical transposition  DATE: 11/28/22    Lorri Powell has been recovering satisfactorily since her procedure. Pain has been well controlled without pain medications. She has been happy with her results thus far and presents for evaluation regarding some additional contouring procedures. She was found to have developed left sided breast cancer and underwent lumpectomy.  She is to initiate her radiation treatment.    Since her last evaluation, she notes that she has been feeling much better.    PMHx:   Past Medical History:   Diagnosis Date    Anxiety     Breast cancer (HCC)     at 40    Breast cancer (HCC) 08/2023    Cervical disc disorder at C6-C7 level with myelopathy 2012    Cervical spondylarthritis     Class 1 obesity due to excess calories without serious comorbidity with body mass index (BMI) of 30.0 to 30.9 in adult 08/06/2018    COVID-19 01/2021    Depression     Essential tremor     Hyperlipidemia 10/08/2021    Hypothyroidism     Migraine     hx depakote, nadalol, topamax (stomach upset)    Neuropathy     Reactive hypertension 12/22/2021    TMJ (temporomandibular joint syndrome)     TMJ disease     Urinary incontinence 08/24/2016    Wears glasses      PSHx:   Past Surgical History:   Procedure Laterality Date    ABDOMINOPLASTY N/A 11/28/2022    ABDOMINOPLASTY WITH RECTUS PLICATION performed by Yunior Corbett MD at [facility] OR    BREAST LUMPECTOMY      right at  41 yo     BREAST LUMPECTOMY  10/2024    BREAST REDUCTION SURGERY Left 12/30/2021    LEFT BREAST REDUCTION/ performed by Yunior Corbett MD at [facility] OR    FAT TRANSFER Right 12/30/2021    RIGHT BREAST FAT GRAFTING performed by Yunior Corbett MD at [facility] OR    FAT TRANSFER Right 11/28/2022    RIGHT BREAST FAT GRAFTING AND performed by Yunior Corbett MD at [facility] OR    KNEE SURGERY Right 01/01/2013    FISH

## 2025-07-15 DIAGNOSIS — M79.2 NEUROLOGICAL PAIN DISORDER: ICD-10-CM

## 2025-07-15 NOTE — TELEPHONE ENCOUNTER
Medication:   Requested Prescriptions     Pending Prescriptions Disp Refills    amitriptyline (ELAVIL) 25 MG tablet [Pharmacy Med Name: AMITRIPTYLINE HCL 25 MG TAB] 90 tablet 0     Sig: TAKE 3 TABLETS BY MOUTH AT BEDTIME        Last Filled:  6/6/25    Patient Phone Number: [phone] (home)     Last appt: 4/28/2025   Next appt: Visit date not found    Last OARRS:       8/6/2018    11:58 AM   RX Monitoring   Attestation The Prescription Monitoring Report for this patient was reviewed today.   Periodic Controlled Substance Monitoring Possible medication side effects, risk of tolerance/dependence & alternative treatments discussed.;No signs of potential drug abuse or diversion identified.

## 2025-07-16 NOTE — TELEPHONE ENCOUNTER
Medication and Quantity requested:   SUMAtriptan (IMITREX) 100 MG tablet       Last Visit  7/14/25    Pharmacy and phone number updated in [facility]:  yes  [facility]

## 2025-07-17 RX ORDER — SUMATRIPTAN SUCCINATE 100 MG/1
100 TABLET ORAL
Qty: 9 TABLET | Refills: 6 | Status: SHIPPED | OUTPATIENT
Start: 2025-07-17

## 2025-07-17 NOTE — TELEPHONE ENCOUNTER
Medication:   Requested Prescriptions     Pending Prescriptions Disp Refills    SUMAtriptan (IMITREX) 100 MG tablet 9 tablet 6        Last Filled:  8/19/21    Patient Phone Number: [phone] (home)     Last appt: 4/28/2025   Next appt: Visit date not found    Last OARRS:       8/6/2018    11:58 AM   RX Monitoring   Attestation The Prescription Monitoring Report for this patient was reviewed today.   Periodic Controlled Substance Monitoring Possible medication side effects, risk of tolerance/dependence & alternative treatments discussed.;No signs of potential drug abuse or diversion identified.

## (undated) DEVICE — TOWEL,STOP FLAG GOLD N-W: Brand: MEDLINE

## (undated) DEVICE — SUTURE MCRYL SZ 3-0 L27IN ABSRB UD L19MM PS-2 3/8 CIR PRIM Y427H

## (undated) DEVICE — INTENDED FOR TISSUE SEPARATION, AND OTHER PROCEDURES THAT REQUIRE A SHARP SURGICAL BLADE TO PUNCTURE OR CUT.: Brand: BARD-PARKER ® CARBON RIB-BACK BLADES

## (undated) DEVICE — TUBING BRST PMP L9FT DISP LAMIS

## (undated) DEVICE — SOLUTION IV 1000ML 0.9% SOD CHL

## (undated) DEVICE — TOWEL,OR,DSP,ST,BLUE,DLX,8/PK,10PK/CS: Brand: MEDLINE

## (undated) DEVICE — STRAP SFTY W5XL72IN 1 PC

## (undated) DEVICE — ASPIRATION TUBING SET, DISPOSABLE: Brand: MICROAIRE®

## (undated) DEVICE — SUTURE PLN GUT SZ 5-0 L18IN ABSRB YELLOWISH TAN L13MM PC-1 1915G

## (undated) DEVICE — TUBING, SUCTION, 9/32" X 12', STRAIGHT: Brand: MEDLINE INDUSTRIES, INC.

## (undated) DEVICE — PLASMABLADE PS210-030S 3.0S LOCK: Brand: PLASMABLADE™

## (undated) DEVICE — INTENDED USE FOR SURGICAL MARKING ON INTACT SKIN, ALSO PROVIDES A PERMANENT METHOD OF IDENTIFYING OBJECTS IN THE OPERATING ROOM: Brand: WRITESITE® PLUS MINI PREP RESISTANT MARKER

## (undated) DEVICE — SYRINGE 60ML IRRIG PISTON TOMEY

## (undated) DEVICE — SYRINGE CATH TIP 50ML

## (undated) DEVICE — BRA SURG X LG 42-44 IN VELCRO FRONT CLOSURE LACE TRIM

## (undated) DEVICE — DRESSING GERM DIA1IN CNTR H DIA7MM BLU CHG ANTIMIC PROTCT

## (undated) DEVICE — PAD FOAM 11.75X7-7/8 IN RESTON LF

## (undated) DEVICE — SUTURE VCRL SZ 2-0 L18IN ABSRB VLT L26MM SH 1/2 CIR J775D

## (undated) DEVICE — APPLIER LIG CLP M L11IN TI STR RNG HNDL FOR 20 CLP DISP

## (undated) DEVICE — 4MM SINGLE USE CANNULA, 10MM PORT, 22CM LONG, MERCEDES: Brand: MICROAIRE®

## (undated) DEVICE — SYSTEM LIPO FAT PROC REVOLVE RV001] ALLERGAN USA INC]

## (undated) DEVICE — PADDING CAST W20XL29.8IN FOAM SELF ADH M SUPP LTWT RESTON

## (undated) DEVICE — APPLICATOR MEDICATED 26 CC SOLUTION HI LT ORNG CHLORAPREP

## (undated) DEVICE — Z DUP USE 2701075 SYSTEM SKIN CLSR 42CM DERMBND PRINEO

## (undated) DEVICE — BINDER ABD UNIV H9IN WAIST 30-45IN E SFT COT PREM 3 PNL

## (undated) DEVICE — TOTAL TRAY, 16FR 10ML SIL FOLEY, URN: Brand: MEDLINE

## (undated) DEVICE — PLASTIC MAJOR: Brand: MEDLINE INDUSTRIES, INC.

## (undated) DEVICE — SUTURE PERMA-HAND 0 L18IN NONABSORBABLE BLK CT-1 L36MM 1/2 C021D

## (undated) DEVICE — 1000 S-DRAPE TOWEL DRAPE 10/BX: Brand: STERI-DRAPE™

## (undated) DEVICE — GLOVE ORANGE PI 7 1/2   MSG9075

## (undated) DEVICE — GLOVE ORANGE PI 8 1/2   MSG9085

## (undated) DEVICE — Device

## (undated) DEVICE — SUTURE PDS II SZ 1 L36IN ABSRB VLT L48MM CTX 1/2 CIR Z371T

## (undated) DEVICE — 3M™ TEGADERM™ TRANSPARENT FILM DRESSING FRAME STYLE, 1624W, 2-3/8 IN X 2-3/4 IN (6 CM X 7 CM), 100/CT 4CT/CASE: Brand: 3M™ TEGADERM™

## (undated) DEVICE — SYRINGE IRRIG 60ML SFT PLIABLE BLB EZ TO GRP 1 HND USE W/

## (undated) DEVICE — BLANKET WRM W40.2XL55.9IN IORT LO BODY + MISTRAL AIR

## (undated) DEVICE — BLADE CLIPPER SURG SENSICLIP

## (undated) DEVICE — SHEET,DRAPE,40X58,STERILE: Brand: MEDLINE

## (undated) DEVICE — ST FLUFF LG 1 PLY: Brand: DEROYAL

## (undated) DEVICE — SYRINGE MED 10ML LUERLOCK TIP W/O SFTY DISP

## (undated) DEVICE — SUTURE STRATAFIX SPRL MCRYL + SZ 3-0 L18IN ABSRB UD PS-2 SXMP1B107

## (undated) DEVICE — SUTURE ETHLN SZ 3-0 L18IN NONABSORBABLE BLK FS-1 L24MM 3/8 663H

## (undated) DEVICE — SYSTEM SKIN CLOSURE 42CM DERMABOND PRINEO

## (undated) DEVICE — DRAIN SURG 15FR L3 16IN DIA47MM SIL RND HUBLESS FULL FLUT

## (undated) DEVICE — RETRACTOR SURG WIDE FLAT LO PROF LTWT PHOTONGUIDE

## (undated) DEVICE — DRESSING,GAUZE,XEROFORM,CURAD,1"X8",ST: Brand: CURAD